# Patient Record
Sex: MALE | Race: WHITE | Employment: OTHER | URBAN - NONMETROPOLITAN AREA
[De-identification: names, ages, dates, MRNs, and addresses within clinical notes are randomized per-mention and may not be internally consistent; named-entity substitution may affect disease eponyms.]

---

## 2024-01-29 ENCOUNTER — OFFICE VISIT (OUTPATIENT)
Dept: PRIMARY CARE | Facility: CLINIC | Age: 89
End: 2024-01-29
Payer: MEDICARE

## 2024-01-29 ENCOUNTER — TELEPHONE (OUTPATIENT)
Dept: PRIMARY CARE | Facility: CLINIC | Age: 89
End: 2024-01-29

## 2024-01-29 VITALS
HEIGHT: 70 IN | SYSTOLIC BLOOD PRESSURE: 120 MMHG | HEART RATE: 50 BPM | DIASTOLIC BLOOD PRESSURE: 54 MMHG | OXYGEN SATURATION: 99 % | BODY MASS INDEX: 23.82 KG/M2 | WEIGHT: 166.4 LBS

## 2024-01-29 DIAGNOSIS — L72.0 EPIDERMOID CYST: ICD-10-CM

## 2024-01-29 DIAGNOSIS — K64.9 HEMORRHOIDS, UNSPECIFIED HEMORRHOID TYPE: Primary | ICD-10-CM

## 2024-01-29 PROCEDURE — 1160F RVW MEDS BY RX/DR IN RCRD: CPT | Performed by: STUDENT IN AN ORGANIZED HEALTH CARE EDUCATION/TRAINING PROGRAM

## 2024-01-29 PROCEDURE — 99204 OFFICE O/P NEW MOD 45 MIN: CPT | Performed by: STUDENT IN AN ORGANIZED HEALTH CARE EDUCATION/TRAINING PROGRAM

## 2024-01-29 PROCEDURE — 1159F MED LIST DOCD IN RCRD: CPT | Performed by: STUDENT IN AN ORGANIZED HEALTH CARE EDUCATION/TRAINING PROGRAM

## 2024-01-29 RX ORDER — INSULIN GLARGINE-YFGN 100 [IU]/ML
14 INJECTION, SOLUTION SUBCUTANEOUS EVERY MORNING
COMMUNITY
Start: 2023-07-18

## 2024-01-29 RX ORDER — GLIPIZIDE 2.5 MG/1
2.5 TABLET, EXTENDED RELEASE ORAL
COMMUNITY

## 2024-01-29 RX ORDER — PRAVASTATIN SODIUM 20 MG/1
1 TABLET ORAL DAILY
COMMUNITY
Start: 2023-12-27

## 2024-01-29 RX ORDER — TRIAMCINOLONE ACETONIDE 1 MG/G
CREAM TOPICAL 2 TIMES DAILY
Qty: 15 G | Refills: 0 | Status: SHIPPED | OUTPATIENT
Start: 2024-01-29

## 2024-01-29 RX ORDER — FINASTERIDE 5 MG/1
5 TABLET, FILM COATED ORAL DAILY
COMMUNITY
Start: 2023-12-05

## 2024-01-29 RX ORDER — METOPROLOL SUCCINATE 25 MG/1
0.5 TABLET, EXTENDED RELEASE ORAL DAILY
COMMUNITY
Start: 2023-10-02 | End: 2024-01-29

## 2024-01-29 RX ORDER — PROPYLTHIOURACIL 50 MG/1
50 TABLET ORAL 3 TIMES DAILY
COMMUNITY
Start: 2023-12-27

## 2024-01-29 RX ORDER — INSULIN GLARGINE 100 [IU]/ML
15 INJECTION, SOLUTION SUBCUTANEOUS EVERY MORNING
COMMUNITY
Start: 2019-02-08

## 2024-01-29 RX ORDER — FERROUS SULFATE 325(65) MG
325 TABLET ORAL
COMMUNITY
Start: 2022-12-13

## 2024-01-29 RX ORDER — CLOPIDOGREL BISULFATE 75 MG/1
75 TABLET ORAL DAILY
COMMUNITY
Start: 2023-10-10

## 2024-01-29 ASSESSMENT — PATIENT HEALTH QUESTIONNAIRE - PHQ9
SUM OF ALL RESPONSES TO PHQ9 QUESTIONS 1 AND 2: 0
1. LITTLE INTEREST OR PLEASURE IN DOING THINGS: NOT AT ALL
2. FEELING DOWN, DEPRESSED OR HOPELESS: NOT AT ALL

## 2024-01-29 NOTE — PROGRESS NOTES
Subjective:  Enrike Crespo is a 89 y.o. male who presents to clinic today for Establish Care (Rectal Bleeding)    [facility]    Bleeding hemmorhoid:  - not painful just bleeding  - he's had this for many years and had it operated on 35 years ago  - has been back for several weeks  - he has to wear a pad daily now which is bothersome  - he does take iron supplements for anemia  - he notes dark black stools and firm stools    He felt lightheaded yesterday  - he is [facility] on [facility].    Lumps:  - he's got some lumps on his chest and back  -he's had these for several years and they itch  - he once had one popped and a lot of debris came out per his report      Review of Systems    Assessment/Plan:  Enrike Crespo is a 89 y.o. male with an extensive medical history who presents to clinic today to address the following issues:   1. Hemorrhoids, unspecified hemorrhoid type  Referral to General Surgery      2. Epidermoid cyst  triamcinolone (Kenalog) 0.1 % cream        Hemorrhoid:  - Chronic problem, unresolved, new to this provider, requires further workup and treatment   - Discussed with pt that this is likely an internal hemorrhoid that has came out due to his bleeding and lack of pain. Will refer to surgeon where he may qualify for banding or other options  - we discussed topical treatments and stool softeners and need for soft stools to prevent future issues    Cysts:  - Chronic problem, unresolved, new to this provider, requires further workup and treatment   - Discussed with pt that these could be surgicall removed but that they are outside of the scope of my practice, will refer to general surgery for possible excision      Problem List Items Addressed This Visit    None  Visit Diagnoses       Hemorrhoids, unspecified hemorrhoid type    -  Primary    Relevant Orders    Referral to General Surgery    Epidermoid cyst        Relevant  "Medications    triamcinolone (Kenalog) 0.1 % cream            Follow up: as needed    Return precautions discussed.  An After Visit Summary was given to the patient.  All questions were answered and patient in agreement with plan.    Objective:  /54   Pulse 50   Ht 1.778 m (5' 10\")   Wt 75.5 kg (166 lb 6.4 oz)   SpO2 99%   BMI 23.88 kg/m²     Physical Exam  Vitals and nursing note reviewed.   Constitutional:       General: He is not in acute distress.     Appearance: He is obese. He is not ill-appearing.   HENT:      Head: Normocephalic and atraumatic.      Mouth/Throat:      Mouth: Mucous membranes are moist.   Eyes:      General: No scleral icterus.        Right eye: No discharge.         Left eye: No discharge.      Extraocular Movements: Extraocular movements intact.      Conjunctiva/sclera: Conjunctivae normal.   Pulmonary:      Effort: No respiratory distress.   Genitourinary:         Comments: Hemorrhoid present in rectum that is nontender  Skin:     General: Skin is dry.      Comments: Raised firm lesion on chest 4cm by 2cm with a central dark area that appears to be the opening, he has 3 similar smaller lesions on his back   Neurological:      General: No focal deficit present.      Mental Status: He is alert and oriented to person, place, and time.   Psychiatric:         Thought Content: Thought content normal.         Judgment: Judgment normal.         I spent 39 minutes in total time for this visit including all related clinical activities before, during, and after the visit excluding other billable activities/procedure time.     Rosario Dumas MD    "

## 2024-01-29 NOTE — TELEPHONE ENCOUNTER
Adri:    Please call patient to discuss the following:    Please ask [facility] on Friday if it would be safe for you to hold your blood thinners for Dr. Mg to possibly band your hemorrhoids. Bring this answer with you when you see Dr. Mg.  It may be helpful to call his daughter and ask her.    Once finished please close the encounter.    Thank you,  Rosario Dumas MD

## 2024-02-02 ENCOUNTER — OFFICE VISIT (OUTPATIENT)
Dept: SURGERY | Facility: CLINIC | Age: 89
End: 2024-02-02
Payer: MEDICARE

## 2024-02-02 VITALS
DIASTOLIC BLOOD PRESSURE: 62 MMHG | SYSTOLIC BLOOD PRESSURE: 124 MMHG | WEIGHT: 166 LBS | BODY MASS INDEX: 23.77 KG/M2 | HEIGHT: 70 IN

## 2024-02-02 DIAGNOSIS — K64.9 HEMORRHOIDS, UNSPECIFIED HEMORRHOID TYPE: ICD-10-CM

## 2024-02-02 PROCEDURE — 99203 OFFICE O/P NEW LOW 30 MIN: CPT | Performed by: SURGERY

## 2024-02-02 PROCEDURE — 1160F RVW MEDS BY RX/DR IN RCRD: CPT | Performed by: SURGERY

## 2024-02-02 PROCEDURE — 1159F MED LIST DOCD IN RCRD: CPT | Performed by: SURGERY

## 2024-02-02 NOTE — LETTER
"2024     Rosario Dumas MD  [address]\[facility]\"" [id number]    Patient: Enrike Crespo   YOB: 1931   Date of Visit: 2024       Dear Dr. Rosario Dumas MD:    Thank you for referring Enrike Crespo to me for evaluation. Below are my notes for this consultation.  If you have questions, please do not hesitate to call me. I look forward to following your patient along with you.       Sincerely,     Laila Mg MD      CC: No Recipients  ______________________________________________________________________________________    General Surgery Consultation    Patient: Enrike Crespo  : 1931  MRN: [id number]  Date of Consultation: 24    Referring Primary Care Provider: Rosario Dumas MD    Chief Complaint: BRBPR    History of Present Illness: Enrike Crespo is a 89 y.o. old male seen at the request of Rosario Dumas MD for evaluation of BRBPR.  He has constipation.  He has bowel movements every 2 to 3 days.  He reports that these are \"really hard\".  He states that he is supposed to take stool softeners regularly but does not.  He takes a stool softener on average maybe once every 3 days.  He sees bright red blood per rectum a couple times per month.  This is usually dripping into the toilet bowl as well as soaking through his underpants.  He has been wearing a pad when this bleeding occurs.  The bleeding usually occurs when he strains with a bowel movement.  The bleeding lasts about 2 to 3 days before subsiding.  He has never had a colonoscopy.  He is not interested in having a colonoscopy.  He has no family history of colon or rectal cancer.  He has cardiac disease and takes Plavix and Eliquis.  His daughter states that she does not believe that he would be able to hold these for procedure.    Separate from the bleeding issue, he also has numerous sebaceous cysts.  He has a large 1 on the right side of his chest.  He has a large 1 on his lower back, a as well as 2 " smaller ones on his back.  These occasionally itch.  They have never been infected.  They have previously been drained.  He has had other cysts excised in the past.    Medical History:  Blood per rectum  Diabetes  Heart failure  History of stroke  History of cardiac arrest, has a cardiac defibrillator  Sebaceous cysts    Surgical History:  Cardiac defibrillator placement  Hemorrhoidectomy approximately 35 years ago    Home Medications:  Prior to Admission medications    Medication Sig Start Date End Date Taking? Authorizing Provider   apixaban (Eliquis) 5 mg tablet Take 1 tablet (5 mg) by mouth twice a day. 12/30/19   Historical Provider, MD   clopidogrel (Plavix) 75 mg tablet Take 1 tablet (75 mg) by mouth once daily. 10/10/23   Historical Provider, MD   ferrous sulfate, 325 mg ferrous sulfate, tablet Take 1 tablet by mouth once daily with breakfast. 12/13/22   Historical Provider, MD   finasteride (Proscar) 5 mg tablet Take 1 tablet (5 mg) by mouth once daily. 12/5/23   Historical Provider, MD   glipiZIDE XL (Glucotrol XL) 2.5 mg 24 hr tablet Take 1 tablet (2.5 mg) by mouth once daily.    Historical Provider, MD   insulin glargine (Lantus) 100 unit/mL injection Inject 15 Units under the skin once daily in the morning. 2/8/19   Historical Provider, MD   insulin glargine-yfgn 100 unit/mL (3 mL) Pen Inject 14 Units under the skin once daily in the morning. 7/18/23   Historical Provider, MD   metoprolol succinate XL (Toprol-XL) 25 mg 24 hr tablet Take 0.5 tablets (12.5 mg) by mouth once daily. 10/2/23 1/29/24  Historical Provider, MD   pravastatin (Pravachol) 20 mg tablet Take 1 tablet (20 mg) by mouth once daily. 12/27/23   Historical Provider, MD   propylthiouracil (PTU) 50 mg tablet Take 1 tablet (50 mg) by mouth 3 times a day. 12/27/23   Historical Provider, MD   sacubitriL-valsartan (Entresto) 24-26 mg tablet Take 1 tablet by mouth 2 times a day. 6/29/23 1/29/24  Historical Provider, MD   triamcinolone (Kenalog)  0.1 % cream Apply topically 2 times a day. Apply to affected area 1-2 times daily as needed. 1/29/24   Rosario Dumas MD     Allergies:  is allergic to penicillins.    Family History:   No family history of colon or rectal cancer.  Family history of diabetes and heart disease.    Social History:  Smokes 1 pack/day for 75 years, no alcohol or drug use.    ROS:  Constitutional:  no fever, sweats, and chills  Cardiovascular: + Swelling in ankles, occasional dizziness  Respiratory: + Chronic cough  Gastrointestinal: + BRBPR, black stools, on iron supplementation  Genitourinary: + Frequent urination, regular nighttime urination, history of kidney stones, history of UTIs  Musculoskeletal: + Arthritis, joint pain/stiffness, unsteady gait  Integumentary: no rashes  Neurological: + Weakness, history of stroke  Endocrine: no heat or cold intolerance  Heme/Lymph: + Easy bruising or bleeding, on Eliquis and Plavix    Objective:  There were no vitals taken for this visit.    Physical Exam:  Constitutional: No acute distress, conversant, pleasant, accompanied by his daughter, in a wheelchair, has a life alert type necklace  Neurologic: alert and oriented  Psych: appropriate affect  Ears, Nose, Mouth and Throat: mucus membranes moist  Pulmonary: No labored breathing  Cardiovascular: Regular rate and rhythm, defibrillator in left chest  Abdomen: Nondistended, BMI 24  Rectal: No significant external hemorrhoidal disease or perianal abnormalities, normal tone on digital rectal exam, no palpable masses.  Musculoskeletal: Moves all extremities, no edema  Skin: He has a large sebaceous cyst with a central dry open punctum measuring roughly 4 cm in diameter overlying the right upper chest.  This is relatively mobile, nontender, no erythema or sign of infection.  On his lower back he has a similar large sebaceous cyst with an open punctum and no sign of infection.  He has 2 smaller cyst as well on the lower back.    Procedure:  Lighted  anoscopy was performed with the assistance of my medical assistant, Simi.  This was done with the patient leaning over the exam table as he was unable to kneel.  He was actually quite weak when standing and got a little bit dizzy.  He required significant support and assistance.  This revealed only minimally enlarged internal hemorrhoids.  These did not bleed on contact with the scope.    Labs/Imaging:   No pertinent labs or imaging available for review.    Assessment and Plan: Enrike Crespo is a 89 y.o. old male with blood per rectum.  The most likely etiology is his hemorrhoids.  We discussed the option of colonoscopy to evaluate for a more proximal source.  He is not interested in this.  His daughter is under the impression that it would be too risky to hold his Plavix and Eliquis for hemorrhoid banding procedure, particularly because I would also need it held for 5 days after the procedure.  I think his hemorrhoids are minimal enough that if we get his chronic constipation under control that this will greatly improve his bleeding.  Recommended that he take Colace twice daily.  He needs to drink adequate water.  We discussed the option of taking prunes or prune juice, but patient does not like these.  Regardless of how we accomplish this, we need to get his bowel movements soft and regular so that he does not have to strain.    In regards to his cysts, I would be willing to excise these in the office under local anesthetic despite him taking blood thinners.  Because these are superficial, we will hold pressure if he has any issues with oozing.  Patient understands that there is a risk of bleeding, but he would like these excised.  I will see him back next Friday to excise the lesion on his chest.  If he tolerates this okay, we may excise the larger lesion on his back as well.  I have a little bit of concerned that he may not be able to lay in prone positioning, so we may have to do these on separate days  given his deconditioning.    Laila Mg MD  2/2/2024

## 2024-02-02 NOTE — PROGRESS NOTES
"General Surgery Consultation    Patient: Enrike Crespo  : 1931  MRN: [id number]  Date of Consultation: 24    Referring Primary Care Provider: Rosario Dumas MD    Chief Complaint: BRBPR    History of Present Illness: Enrike Crespo is a 89 y.o. old male seen at the request of Rosario Dumas MD for evaluation of BRBPR.  He has constipation.  He has bowel movements every 2 to 3 days.  He reports that these are \"really hard\".  He states that he is supposed to take stool softeners regularly but does not.  He takes a stool softener on average maybe once every 3 days.  He sees bright red blood per rectum a couple times per month.  This is usually dripping into the toilet bowl as well as soaking through his underpants.  He has been wearing a pad when this bleeding occurs.  The bleeding usually occurs when he strains with a bowel movement.  The bleeding lasts about 2 to 3 days before subsiding.  He has never had a colonoscopy.  He is not interested in having a colonoscopy.  He has no family history of colon or rectal cancer.  He has cardiac disease and takes Plavix and Eliquis.  His daughter states that she does not believe that he would be able to hold these for procedure.    Separate from the bleeding issue, he also has numerous sebaceous cysts.  He has a large 1 on the right side of his chest.  He has a large 1 on his lower back, a as well as 2 smaller ones on his back.  These occasionally itch.  They have never been infected.  They have previously been drained.  He has had other cysts excised in the past.    Medical History:  Blood per rectum  Diabetes  Heart failure  History of stroke  History of cardiac arrest, has a cardiac defibrillator  Sebaceous cysts    Surgical History:  Cardiac defibrillator placement  Hemorrhoidectomy approximately 35 years ago    Home Medications:  Prior to Admission medications    Medication Sig Start Date End Date Taking? Authorizing Provider   apixaban (Eliquis) 5 mg tablet " Take 1 tablet (5 mg) by mouth twice a day. 12/30/19   Historical Provider, MD   clopidogrel (Plavix) 75 mg tablet Take 1 tablet (75 mg) by mouth once daily. 10/10/23   Historical Provider, MD   ferrous sulfate, 325 mg ferrous sulfate, tablet Take 1 tablet by mouth once daily with breakfast. 12/13/22   Historical Provider, MD   finasteride (Proscar) 5 mg tablet Take 1 tablet (5 mg) by mouth once daily. 12/5/23   Historical Provider, MD   glipiZIDE XL (Glucotrol XL) 2.5 mg 24 hr tablet Take 1 tablet (2.5 mg) by mouth once daily.    Historical Provider, MD   insulin glargine (Lantus) 100 unit/mL injection Inject 15 Units under the skin once daily in the morning. 2/8/19   Historical Provider, MD   insulin glargine-yfgn 100 unit/mL (3 mL) Pen Inject 14 Units under the skin once daily in the morning. 7/18/23   Historical Provider, MD   metoprolol succinate XL (Toprol-XL) 25 mg 24 hr tablet Take 0.5 tablets (12.5 mg) by mouth once daily. 10/2/23 1/29/24  Historical Provider, MD   pravastatin (Pravachol) 20 mg tablet Take 1 tablet (20 mg) by mouth once daily. 12/27/23   Historical Provider, MD   propylthiouracil (PTU) 50 mg tablet Take 1 tablet (50 mg) by mouth 3 times a day. 12/27/23   Historical Provider, MD   sacubitriL-valsartan (Entresto) 24-26 mg tablet Take 1 tablet by mouth 2 times a day. 6/29/23 1/29/24  Historical Provider, MD   triamcinolone (Kenalog) 0.1 % cream Apply topically 2 times a day. Apply to affected area 1-2 times daily as needed. 1/29/24   Rosario Dumas MD     Allergies:  is allergic to penicillins.    Family History:   No family history of colon or rectal cancer.  Family history of diabetes and heart disease.    Social History:  Smokes 1 pack/day for 75 years, no alcohol or drug use.    ROS:  Constitutional:  no fever, sweats, and chills  Cardiovascular: + Swelling in ankles, occasional dizziness  Respiratory: + Chronic cough  Gastrointestinal: + BRBPR, black stools, on iron  supplementation  Genitourinary: + Frequent urination, regular nighttime urination, history of kidney stones, history of UTIs  Musculoskeletal: + Arthritis, joint pain/stiffness, unsteady gait  Integumentary: no rashes  Neurological: + Weakness, history of stroke  Endocrine: no heat or cold intolerance  Heme/Lymph: + Easy bruising or bleeding, on Eliquis and Plavix    Objective:  There were no vitals taken for this visit.    Physical Exam:  Constitutional: No acute distress, conversant, pleasant, accompanied by his daughter, in a wheelchair, has a life alert type necklace  Neurologic: alert and oriented  Psych: appropriate affect  Ears, Nose, Mouth and Throat: mucus membranes moist  Pulmonary: No labored breathing  Cardiovascular: Regular rate and rhythm, defibrillator in left chest  Abdomen: Nondistended, BMI 24  Rectal: No significant external hemorrhoidal disease or perianal abnormalities, normal tone on digital rectal exam, no palpable masses.  Musculoskeletal: Moves all extremities, no edema  Skin: He has a large sebaceous cyst with a central dry open punctum measuring roughly 4 cm in diameter overlying the right upper chest.  This is relatively mobile, nontender, no erythema or sign of infection.  On his lower back he has a similar large sebaceous cyst with an open punctum and no sign of infection.  He has 2 smaller cyst as well on the lower back.    Procedure:  Lighted anoscopy was performed with the assistance of my medical assistant, Simi.  This was done with the patient leaning over the exam table as he was unable to kneel.  He was actually quite weak when standing and got a little bit dizzy.  He required significant support and assistance.  This revealed only minimally enlarged internal hemorrhoids.  These did not bleed on contact with the scope.    Labs/Imaging:   No pertinent labs or imaging available for review.    Assessment and Plan: Enrike Crespo is a 89 y.o. old male with blood per rectum.  The  most likely etiology is his hemorrhoids.  We discussed the option of colonoscopy to evaluate for a more proximal source.  He is not interested in this.  His daughter is under the impression that it would be too risky to hold his Plavix and Eliquis for hemorrhoid banding procedure, particularly because I would also need it held for 5 days after the procedure.  I think his hemorrhoids are minimal enough that if we get his chronic constipation under control that this will greatly improve his bleeding.  Recommended that he take Colace twice daily.  He needs to drink adequate water.  We discussed the option of taking prunes or prune juice, but patient does not like these.  Regardless of how we accomplish this, we need to get his bowel movements soft and regular so that he does not have to strain.    In regards to his cysts, I would be willing to excise these in the office under local anesthetic despite him taking blood thinners.  Because these are superficial, we will hold pressure if he has any issues with oozing.  Patient understands that there is a risk of bleeding, but he would like these excised.  I will see him back next Friday to excise the lesion on his chest.  If he tolerates this okay, we may excise the larger lesion on his back as well.  I have a little bit of concerned that he may not be able to lay in prone positioning, so we may have to do these on separate days given his deconditioning.    Laila Mg MD  2/2/2024

## 2024-02-09 ENCOUNTER — APPOINTMENT (OUTPATIENT)
Dept: SURGERY | Facility: CLINIC | Age: 89
End: 2024-02-09
Payer: MEDICARE

## 2024-02-15 ENCOUNTER — APPOINTMENT (OUTPATIENT)
Dept: SURGERY | Facility: CLINIC | Age: 89
End: 2024-02-15
Payer: MEDICARE

## 2024-02-16 ENCOUNTER — APPOINTMENT (OUTPATIENT)
Dept: SURGERY | Facility: CLINIC | Age: 89
End: 2024-02-16
Payer: MEDICARE

## 2024-02-22 ENCOUNTER — APPOINTMENT (OUTPATIENT)
Dept: SURGERY | Facility: CLINIC | Age: 89
End: 2024-02-22
Payer: MEDICARE

## 2024-03-07 ENCOUNTER — PROCEDURE VISIT (OUTPATIENT)
Dept: SURGERY | Facility: CLINIC | Age: 89
End: 2024-03-07
Payer: MEDICARE

## 2024-03-07 VITALS
BODY MASS INDEX: 23.77 KG/M2 | WEIGHT: 166 LBS | DIASTOLIC BLOOD PRESSURE: 60 MMHG | SYSTOLIC BLOOD PRESSURE: 122 MMHG | HEIGHT: 70 IN

## 2024-03-07 DIAGNOSIS — M79.89 SOFT TISSUE MASS: Primary | ICD-10-CM

## 2024-03-07 PROCEDURE — 21552 EXC NECK LES SC 3 CM/>: CPT | Performed by: SURGERY

## 2024-03-07 PROCEDURE — 99213 OFFICE O/P EST LOW 20 MIN: CPT | Performed by: SURGERY

## 2024-03-07 PROCEDURE — 88304 TISSUE EXAM BY PATHOLOGIST: CPT | Performed by: PATHOLOGY

## 2024-03-07 PROCEDURE — 88304 TISSUE EXAM BY PATHOLOGIST: CPT

## 2024-03-07 PROCEDURE — 0752T DGTZ GLS MCRSCP SLD LVL III: CPT

## 2024-03-07 NOTE — PROGRESS NOTES
General Surgery Follow-up Visit    Patient: Enrike Crespo  : 1931  MRN: [id number]  Date of Visit: 24    Chief Complaint: Cyst on chest    History of Present Illness: Enrike Crespo is a 89 y.o. old male with sebaceous cysts.  The largest is on the right anterior chest.  He has another large 1 on his lower back to the left of midline.  He has a few additional smaller cysts on his back.  He held his Eliquis and Plavix for office-based excision of the large cyst on his chest today.  Medical History:  Blood per rectum  Diabetes  Heart failure  History of stroke  History of cardiac arrest, has a cardiac defibrillator  Sebaceous cysts     Surgical History:  Cardiac defibrillator placement  Hemorrhoidectomy approximately 35 years ago    Home Medications:  Prior to Admission medications    Medication Sig Start Date End Date Taking? Authorizing Provider   apixaban (Eliquis) 5 mg tablet Take 1 tablet (5 mg) by mouth twice a day. 19  Yes Historical Provider, MD   clopidogrel (Plavix) 75 mg tablet Take 1 tablet (75 mg) by mouth once daily. 10/10/23  Yes Historical Provider, MD   ferrous sulfate, 325 mg ferrous sulfate, tablet Take 1 tablet by mouth once daily with breakfast. 22  Yes Historical Provider, MD   finasteride (Proscar) 5 mg tablet Take 1 tablet (5 mg) by mouth once daily. 23  Yes Historical Provider, MD   glipiZIDE XL (Glucotrol XL) 2.5 mg 24 hr tablet Take 1 tablet (2.5 mg) by mouth once daily.   Yes Historical Provider, MD   insulin glargine (Lantus) 100 unit/mL injection Inject 15 Units under the skin once daily in the morning. 19  Yes Historical Provider, MD   insulin glargine-yfgn 100 unit/mL (3 mL) Pen Inject 14 Units under the skin once daily in the morning. 23  Yes Historical Provider, MD   pravastatin (Pravachol) 20 mg tablet Take 1 tablet (20 mg) by mouth once daily. 23  Yes Historical Provider, MD   propylthiouracil (PTU) 50 mg tablet Take 1 tablet (50 mg) by  "mouth 3 times a day. 12/27/23  Yes Historical Provider, MD   triamcinolone (Kenalog) 0.1 % cream Apply topically 2 times a day. Apply to affected area 1-2 times daily as needed. 1/29/24  Yes Rosario Dumas MD   metoprolol succinate XL (Toprol-XL) 25 mg 24 hr tablet Take 0.5 tablets (12.5 mg) by mouth once daily. 10/2/23 1/29/24  Historical Provider, MD   sacubitriL-valsartan (Entresto) 24-26 mg tablet Take 1 tablet by mouth 2 times a day. 6/29/23 1/29/24  Historical Provider, MD       Allergies:  is allergic to penicillins.     Family History:   No family history of colon or rectal cancer.  Family history of diabetes and heart disease.     Social History:  Smokes 1 pack/day for 75 years, no alcohol or drug use.     ROS:  Constitutional:  no fever, sweats, and chills  Cardiovascular: + Swelling in ankles, occasional dizziness  Respiratory: + Chronic cough  Gastrointestinal: + BRBPR, black stools, on iron supplementation  Genitourinary: + Frequent urination, regular nighttime urination, history of kidney stones, history of UTIs  Musculoskeletal: + Arthritis, joint pain/stiffness, unsteady gait  Integumentary: no rashes  Neurological: + Weakness, history of stroke  Endocrine: no heat or cold intolerance  Heme/Lymph: + Easy bruising or bleeding, on Eliquis and Plavix    Objective:  /60   Ht 1.78 m (5' 10.08\")   Wt 75.3 kg (166 lb)   BMI 23.76 kg/m²     Physical Exam:  Constitutional: No acute distress, conversant, pleasant, accompanied by his daughter, in a wheelchair, has a life alert type necklace  Neurologic: alert and oriented  Psych: appropriate affect  Ears, Nose, Mouth and Throat: mucus membranes moist  Pulmonary: No labored breathing  Cardiovascular: Regular rate and rhythm, defibrillator in left chest  Abdomen: Nondistended, BMI 24  Musculoskeletal: Moves all extremities, no edema  Skin: He has a large sebaceous cyst with a central dry open punctum measuring roughly 4 cm in diameter overlying the " right upper chest.  This is relatively mobile, nontender, no erythema or sign of infection.  On his lower back he has a similar large sebaceous cyst with an open punctum and no sign of infection.  He has 2 smaller cyst as well on the lower back.    Procedure: Excision of soft tissue mass on chest  Description: Patient was brought to the procedure room and placed in supine position.  The skin of the right chest was prepped with Betadine solution.  An incision was made overlying the large subcutaneous soft tissue mass on his right upper chest after anesthetizing the skin with 1% lidocaine.  The soft tissue mass had a capsule filled with a large volume of sebaceous debris.  This was consistent with a sebaceous cyst.  It measured 4 cm diameter.  It had a central punctum which was included within the incision.  The contents were expressed out of the capsule to facilitate removal.  The capsule was then excised in its entirety and for the most part remained intact with the exception of the anterior surface which was incised with the initial incision.  The wound was irrigated.  Hemostasis was obtained with cautery.  The skin was closed with simple interrupted 4-0 Prolene.  He tolerated the procedure well.    Labs/Imaging:   No pertinent labs or imaging available for review.    Assessment and Plan: Enrike Crespo is a 89 y.o. old male with multiple sebaceous cysts.  The most bothersome to him was the one on his right upper chest.  We excised this in the office today.  He tolerated the procedure well.  He can keep a Band-Aid dressing on the incision to protect his clothing.  He will resume his antiplatelet and anticoagulation tomorrow.  I will see him back on 3/18/2024 for suture removal and to review pathology.  If he is interested in having the larger lesion on his lower back excised in the future, we will arrange for that to be done in the office as well.    Laila Mg MD  3/7/2024

## 2024-03-13 LAB
LABORATORY COMMENT REPORT: NORMAL
PATH REPORT.FINAL DX SPEC: NORMAL
PATH REPORT.GROSS SPEC: NORMAL
PATH REPORT.RELEVANT HX SPEC: NORMAL
PATH REPORT.TOTAL CANCER: NORMAL

## 2024-03-18 ENCOUNTER — PROCEDURE VISIT (OUTPATIENT)
Dept: SURGERY | Facility: CLINIC | Age: 89
End: 2024-03-18
Payer: MEDICARE

## 2024-03-18 VITALS
WEIGHT: 166 LBS | SYSTOLIC BLOOD PRESSURE: 124 MMHG | BODY MASS INDEX: 23.77 KG/M2 | DIASTOLIC BLOOD PRESSURE: 70 MMHG | HEIGHT: 70 IN

## 2024-03-18 DIAGNOSIS — Z48.02 VISIT FOR SUTURE REMOVAL: Primary | ICD-10-CM

## 2024-03-18 PROCEDURE — 99212 OFFICE O/P EST SF 10 MIN: CPT | Performed by: SURGERY

## 2024-03-18 NOTE — PROGRESS NOTES
"General Surgery Post-Operative Visit    Patient: Enrike Crespo  : 1931  MRN: [id number]  Date of Visit: 24    Chief Complaint: s/p excision of a soft tissue mass    History of Present Illness: Enrike Crespo is a 89 y.o. old male s/p excision of a soft tissue mass on the right side of his chest.  Pathology showed epidermal inclusion cyst.  He presents today for pathology review and suture removal.  He has been doing well.  He is back on his anticoagulation.  He has a little bit of a hematoma underneath his incision.  He has been keeping a dry dressing on the incision, although when we removed the dressing today, he had a small amount of bloody drainage on it.  He reports that he \"bleeds from everywhere\" while on his Eliquis.  He has an additional bandage on his right forearm where he had bleeding from a skin tear.  He denies any fever or redness at the incision.    Home Medications:  Prior to Admission medications    Medication Sig Start Date End Date Taking? Authorizing Provider   apixaban (Eliquis) 5 mg tablet Take 1 tablet (5 mg) by mouth twice a day. 19   Historical Provider, MD   clopidogrel (Plavix) 75 mg tablet Take 1 tablet (75 mg) by mouth once daily. 10/10/23   Historical Provider, MD   ferrous sulfate, 325 mg ferrous sulfate, tablet Take 1 tablet by mouth once daily with breakfast. 22   Historical Provider, MD   finasteride (Proscar) 5 mg tablet Take 1 tablet (5 mg) by mouth once daily. 23   Historical Provider, MD   glipiZIDE XL (Glucotrol XL) 2.5 mg 24 hr tablet Take 1 tablet (2.5 mg) by mouth once daily.    Historical Provider, MD   insulin glargine (Lantus) 100 unit/mL injection Inject 15 Units under the skin once daily in the morning. 19   Historical Provider, MD   insulin glargine-yfgn 100 unit/mL (3 mL) Pen Inject 14 Units under the skin once daily in the morning. 23   Historical Provider, MD   metoprolol succinate XL (Toprol-XL) 25 mg 24 hr tablet Take 0.5 " "tablets (12.5 mg) by mouth once daily. 10/2/23 1/29/24  Historical Provider, MD   pravastatin (Pravachol) 20 mg tablet Take 1 tablet (20 mg) by mouth once daily. 12/27/23   Historical Provider, MD   propylthiouracil (PTU) 50 mg tablet Take 1 tablet (50 mg) by mouth 3 times a day. 12/27/23   Historical Provider, MD   sacubitriL-valsartan (Entresto) 24-26 mg tablet Take 1 tablet by mouth 2 times a day. 6/29/23 1/29/24  Historical Provider, MD   triamcinolone (Kenalog) 0.1 % cream Apply topically 2 times a day. Apply to affected area 1-2 times daily as needed. 1/29/24   Rosario Dumas MD     Allergies:  is allergic to penicillins.    ROS:  No fever, redness, drainage, or bleeding from incision    Objective:  /70   Ht 1.78 m (5' 10.08\")   Wt 75.3 kg (166 lb)   BMI 23.76 kg/m²     Physical Exam:  No acute distress  No labored breathing  Incisional right-sided chest clean, dry, intact, no erythema, he has a small fullness underneath this incision which is likely of a hematoma.  No sign of infection.  No expressible drainage. Sutures were removed without complication.  Steri-Strips were placed.    Assessment and Plan: Enrike Crespo is a 89 y.o. old male s/p excision of a soft tissue mass from the right side of his chest.  Pathology confirmed that this was a benign epidermal inclusion cyst.  His sutures were removed in the office today.  He has a small hematoma, but no sign of infection and we will therefore take an observational approach to this.  He will leave Steri-Strips in place until they fall off on their own.  If still present in 2 weeks, he can take them off.  He has additional cysts on his back.  He is interested in having these excised in the future.  His daughter wanted to give it a little more time in between the procedures.  He is tentatively scheduled to see me back on 4/25/24 for an office-based procedure we will excise the larger cyst on his lower back.  He will hold his Eliquis for that " appointment.    Laila Mg MD  3/18/2024

## 2024-04-25 ENCOUNTER — PROCEDURE VISIT (OUTPATIENT)
Dept: SURGERY | Facility: CLINIC | Age: 89
End: 2024-04-25
Payer: MEDICARE

## 2024-04-25 VITALS
HEIGHT: 70 IN | BODY MASS INDEX: 23.77 KG/M2 | WEIGHT: 166 LBS | SYSTOLIC BLOOD PRESSURE: 126 MMHG | HEART RATE: 68 BPM | DIASTOLIC BLOOD PRESSURE: 70 MMHG

## 2024-04-25 DIAGNOSIS — M79.89 SOFT TISSUE MASS: Primary | ICD-10-CM

## 2024-04-25 PROCEDURE — 21930 EXC BACK LES SC < 3 CM: CPT | Performed by: SURGERY

## 2024-04-25 PROCEDURE — 21931 EXC BACK LES SC 3 CM/>: CPT | Performed by: SURGERY

## 2024-04-25 PROCEDURE — 99212 OFFICE O/P EST SF 10 MIN: CPT | Performed by: SURGERY

## 2024-04-25 PROCEDURE — 88304 TISSUE EXAM BY PATHOLOGIST: CPT | Performed by: PATHOLOGY

## 2024-04-25 PROCEDURE — 88304 TISSUE EXAM BY PATHOLOGIST: CPT

## 2024-04-25 PROCEDURE — 0752T DGTZ GLS MCRSCP SLD LVL III: CPT

## 2024-04-25 NOTE — PROGRESS NOTES
General Surgery Follow-up Visit    Patient: Enrike Crespo  : 1931  MRN: [id number]  Date of Visit: 24    Chief Complaint: Soft tissue mass on back     History of Present Illness: Enrike Crespo is a 89 y.o. old male with multiple sebaceous cysts.  I have previously removed one from his chest.  We did this in the office under local anesthetic.  He did well with that procedure.  He presents today to have two of the more bothersome cysts on his back excised.  These itch and give him discomfort.  He has held his Eliquis and Plavix.    Medical History:  Blood per rectum  Diabetes  Heart failure  History of stroke  History of cardiac arrest, has a cardiac defibrillator  Sebaceous cysts     Surgical History:  Excision of epidermal inclusion cyst on chest  Cardiac defibrillator placement  Hemorrhoidectomy approximately 35 years ago    Home Medications:  Prior to Admission medications    Medication Sig Start Date End Date Taking? Authorizing Provider   apixaban (Eliquis) 5 mg tablet Take 1 tablet (5 mg) by mouth twice a day. 19   Historical Provider, MD   clopidogrel (Plavix) 75 mg tablet Take 1 tablet (75 mg) by mouth once daily. 10/10/23   Historical Provider, MD   ferrous sulfate, 325 mg ferrous sulfate, tablet Take 1 tablet by mouth once daily with breakfast. 22   Historical Provider, MD   finasteride (Proscar) 5 mg tablet Take 1 tablet (5 mg) by mouth once daily. 23   Historical Provider, MD   glipiZIDE XL (Glucotrol XL) 2.5 mg 24 hr tablet Take 1 tablet (2.5 mg) by mouth once daily.    Historical Provider, MD   insulin glargine (Lantus) 100 unit/mL injection Inject 15 Units under the skin once daily in the morning. 19   Historical Provider, MD   insulin glargine-yfgn 100 unit/mL (3 mL) Pen Inject 14 Units under the skin once daily in the morning. 23   Historical Provider, MD   metoprolol succinate XL (Toprol-XL) 25 mg 24 hr tablet Take 0.5 tablets (12.5 mg) by mouth once daily.  "10/2/23 1/29/24  Historical Provider, MD   pravastatin (Pravachol) 20 mg tablet Take 1 tablet (20 mg) by mouth once daily. 12/27/23   Historical Provider, MD   propylthiouracil (PTU) 50 mg tablet Take 1 tablet (50 mg) by mouth 3 times a day. 12/27/23   Historical Provider, MD   sacubitriL-valsartan (Entresto) 24-26 mg tablet Take 1 tablet by mouth 2 times a day. 6/29/23 1/29/24  Historical Provider, MD   triamcinolone (Kenalog) 0.1 % cream Apply topically 2 times a day. Apply to affected area 1-2 times daily as needed. 1/29/24   Rosario Dumas MD     Allergies:  is allergic to penicillins.     Family History:   No family history of colon or rectal cancer.  Family history of diabetes and heart disease.     Social History:  Smokes 1 pack/day for 75 years, no alcohol or drug use.     ROS:  Constitutional:  no fever, sweats, and chills  Cardiovascular: + Swelling in ankles, occasional dizziness  Respiratory: + Chronic cough  Gastrointestinal: + BRBPR, black stools, on iron supplementation  Genitourinary: + Frequent urination, regular nighttime urination, history of kidney stones, history of UTIs  Musculoskeletal: + Arthritis, joint pain/stiffness, unsteady gait  Integumentary: + Cysts on back  Neurological: + Weakness, history of stroke  Endocrine: no heat or cold intolerance  Heme/Lymph: + Easy bruising or bleeding, on Eliquis and Plavix    Objective:  /70   Pulse 68   Ht 1.78 m (5' 10.08\")   Wt 75.3 kg (166 lb)   BMI 23.76 kg/m²     Physical Exam:  Constitutional: No acute distress, conversant, pleasant, accompanied by his daughter, in a wheelchair, has a life alert type necklace  Neurologic: alert and oriented  Psych: appropriate affect  Ears, Nose, Mouth and Throat: mucus membranes moist  Pulmonary: No labored breathing  Cardiovascular: Regular rate and rhythm, defibrillator in left chest  Abdomen: Nondistended, BMI 24  Musculoskeletal: Moves all extremities, no edema  Skin:  On his mid to lower back " along midline he has a large sebaceous cyst, 5cm diameter, with an open 2 mm punctum, no sign of infection.  He has a smaller cyst as well on the left lower back, 2.5 cm in diameter, also with a dark central 2 mm punctum.     Procedure: Excision of soft tissue masses on back  Description: Patient was brought to the procedure room and placed in prone position.  The skin of the mid to lower back was prepped with Betadine solution.  An incision was made overlying the subcutaneous soft tissue mass on his mid-lower back along the midline after anesthetizing the skin with 1% lidocaine.  The soft tissue mass had a capsule filled with a large volume of sebaceous debris.  This was consistent with a sebaceous cyst.  It measured 5 cm diameter.  The capsule was excised in its entirety.  The wound was irrigated.  The wound was hemostatic.  The skin was closed with simple interrupted 4-0 Prolene, as well as a mattress suture in the central portion.  This was then repeated for the cyst on the left lower back.  The skin was anesthetized using 1% lidocaine.  Incision was made overlying the subcutaneous soft tissue mass.  This was also a capsule filled with dark sebaceous debris.  The capsule was removed in its entirety.  Hemostasis was obtained with cautery.  The wound was irrigated.  Skin was closed with simple interrupted 4-0 Prolene.  He tolerated the procedure well.    Labs/Imaging:   No pertinent labs or imaging available for review.    Assessment and Plan: Enrike Crespo is a 89 y.o. old male with sebaceous cysts on his lower back.  Two of these were excised in the office today under local anesthetic.  He tolerated the procedure well.  [facility] coming [facility] Monday for a wound check.  He will need sutures removed in approximately 10 days.  I will schedule a follow-up for suture removal.  However, if his [facility] nurse is comfortable removing the sutures herself she could do this in 10-14 days and he can cancel that visit.  He  can wear a protective dressing just to keep his clothing clean in case if there is any drainage, particularly with him resuming his blood thinners. He can bathe/shower as normal.    Laila gM MD  4/25/2024

## 2024-05-09 ENCOUNTER — APPOINTMENT (OUTPATIENT)
Dept: SURGERY | Facility: CLINIC | Age: 89
End: 2024-05-09
Payer: MEDICARE

## 2024-05-16 ENCOUNTER — TELEPHONE (OUTPATIENT)
Dept: SURGERY | Facility: CLINIC | Age: 89
End: 2024-05-16
Payer: MEDICARE

## 2024-05-16 NOTE — TELEPHONE ENCOUNTER
I called the patient to discuss pathology report from recent cyst excision.  Was no answer, but voicemail was left with callback number provided.  Pathology confirmed that these were benign epidermal inclusion cysts.  His home health nurse had previously moved his sutures.  He will follow-up as needed.    Laila Mg MD  05/16/24  10:15 AM

## 2024-06-12 ENCOUNTER — HOSPITAL ENCOUNTER (OUTPATIENT)
Age: 89
Setting detail: SURGERY ADMIT
End: 2024-06-12
Attending: STUDENT IN AN ORGANIZED HEALTH CARE EDUCATION/TRAINING PROGRAM | Admitting: STUDENT IN AN ORGANIZED HEALTH CARE EDUCATION/TRAINING PROGRAM
Payer: MEDICARE

## 2024-06-12 ENCOUNTER — APPOINTMENT (OUTPATIENT)
Dept: RADIOLOGY | Facility: HOSPITAL | Age: 89
End: 2024-06-12
Payer: MEDICARE

## 2024-06-12 ENCOUNTER — HOSPITAL ENCOUNTER (INPATIENT)
Facility: HOSPITAL | Age: 89
LOS: 1 days | Discharge: ANOTHER HOSPITAL | End: 2024-06-13
Attending: EMERGENCY MEDICINE | Admitting: STUDENT IN AN ORGANIZED HEALTH CARE EDUCATION/TRAINING PROGRAM
Payer: MEDICARE

## 2024-06-12 DIAGNOSIS — K56.609 SMALL BOWEL OBSTRUCTION (MULTI): Primary | ICD-10-CM

## 2024-06-12 LAB
ALBUMIN SERPL BCP-MCNC: 4.1 G/DL (ref 3.4–5)
ALP SERPL-CCNC: 72 U/L (ref 33–136)
ALT SERPL W P-5'-P-CCNC: 7 U/L (ref 10–52)
ANION GAP SERPL CALC-SCNC: 13 MMOL/L (ref 10–20)
AST SERPL W P-5'-P-CCNC: 12 U/L (ref 9–39)
BASOPHILS # BLD AUTO: 0.01 X10*3/UL (ref 0–0.1)
BASOPHILS NFR BLD AUTO: 0.1 %
BILIRUB SERPL-MCNC: 0.3 MG/DL (ref 0–1.2)
BUN SERPL-MCNC: 20 MG/DL (ref 6–23)
CALCIUM SERPL-MCNC: 9.4 MG/DL (ref 8.6–10.3)
CHLORIDE SERPL-SCNC: 105 MMOL/L (ref 98–107)
CO2 SERPL-SCNC: 25 MMOL/L (ref 21–32)
CREAT SERPL-MCNC: 1.12 MG/DL (ref 0.5–1.3)
EGFRCR SERPLBLD CKD-EPI 2021: 61 ML/MIN/1.73M*2
EOSINOPHIL # BLD AUTO: 0.02 X10*3/UL (ref 0–0.4)
EOSINOPHIL NFR BLD AUTO: 0.2 %
ERYTHROCYTE [DISTWIDTH] IN BLOOD BY AUTOMATED COUNT: 13 % (ref 11.5–14.5)
GLUCOSE SERPL-MCNC: 125 MG/DL (ref 74–99)
HCT VFR BLD AUTO: 39.4 % (ref 41–52)
HGB BLD-MCNC: 12.4 G/DL (ref 13.5–17.5)
IMM GRANULOCYTES # BLD AUTO: 0.03 X10*3/UL (ref 0–0.5)
IMM GRANULOCYTES NFR BLD AUTO: 0.3 % (ref 0–0.9)
LACTATE SERPL-SCNC: 1.3 MMOL/L (ref 0.4–2)
LIPASE SERPL-CCNC: 20 U/L (ref 9–82)
LYMPHOCYTES # BLD AUTO: 1.11 X10*3/UL (ref 0.8–3)
LYMPHOCYTES NFR BLD AUTO: 9.3 %
MCH RBC QN AUTO: 31.2 PG (ref 26–34)
MCHC RBC AUTO-ENTMCNC: 31.5 G/DL (ref 32–36)
MCV RBC AUTO: 99 FL (ref 80–100)
MONOCYTES # BLD AUTO: 0.69 X10*3/UL (ref 0.05–0.8)
MONOCYTES NFR BLD AUTO: 5.8 %
NEUTROPHILS # BLD AUTO: 10.06 X10*3/UL (ref 1.6–5.5)
NEUTROPHILS NFR BLD AUTO: 84.3 %
NRBC BLD-RTO: 0 /100 WBCS (ref 0–0)
PLATELET # BLD AUTO: 231 X10*3/UL (ref 150–450)
POTASSIUM SERPL-SCNC: 4.2 MMOL/L (ref 3.5–5.3)
PROT SERPL-MCNC: 6.7 G/DL (ref 6.4–8.2)
RBC # BLD AUTO: 3.98 X10*6/UL (ref 4.5–5.9)
SODIUM SERPL-SCNC: 139 MMOL/L (ref 136–145)
WBC # BLD AUTO: 11.9 X10*3/UL (ref 4.4–11.3)

## 2024-06-12 PROCEDURE — 80053 COMPREHEN METABOLIC PANEL: CPT | Performed by: EMERGENCY MEDICINE

## 2024-06-12 PROCEDURE — 2500000004 HC RX 250 GENERAL PHARMACY W/ HCPCS (ALT 636 FOR OP/ED): Performed by: EMERGENCY MEDICINE

## 2024-06-12 PROCEDURE — 1100000001 HC PRIVATE ROOM DAILY

## 2024-06-12 PROCEDURE — 74177 CT ABD & PELVIS W/CONTRAST: CPT | Performed by: STUDENT IN AN ORGANIZED HEALTH CARE EDUCATION/TRAINING PROGRAM

## 2024-06-12 PROCEDURE — 74018 RADEX ABDOMEN 1 VIEW: CPT | Performed by: RADIOLOGY

## 2024-06-12 PROCEDURE — 99285 EMERGENCY DEPT VISIT HI MDM: CPT | Mod: 25

## 2024-06-12 PROCEDURE — 74018 RADEX ABDOMEN 1 VIEW: CPT

## 2024-06-12 PROCEDURE — 85025 COMPLETE CBC W/AUTO DIFF WBC: CPT | Performed by: EMERGENCY MEDICINE

## 2024-06-12 PROCEDURE — 99222 1ST HOSP IP/OBS MODERATE 55: CPT | Performed by: SURGERY

## 2024-06-12 PROCEDURE — 2500000004 HC RX 250 GENERAL PHARMACY W/ HCPCS (ALT 636 FOR OP/ED): Performed by: STUDENT IN AN ORGANIZED HEALTH CARE EDUCATION/TRAINING PROGRAM

## 2024-06-12 PROCEDURE — 99223 1ST HOSP IP/OBS HIGH 75: CPT | Performed by: STUDENT IN AN ORGANIZED HEALTH CARE EDUCATION/TRAINING PROGRAM

## 2024-06-12 PROCEDURE — 96374 THER/PROPH/DIAG INJ IV PUSH: CPT | Mod: 59

## 2024-06-12 PROCEDURE — 0D9670Z DRAINAGE OF STOMACH WITH DRAINAGE DEVICE, VIA NATURAL OR ARTIFICIAL OPENING: ICD-10-PCS | Performed by: PHARMACIST

## 2024-06-12 PROCEDURE — 2550000001 HC RX 255 CONTRASTS: Performed by: EMERGENCY MEDICINE

## 2024-06-12 PROCEDURE — 74177 CT ABD & PELVIS W/CONTRAST: CPT

## 2024-06-12 PROCEDURE — 71045 X-RAY EXAM CHEST 1 VIEW: CPT | Performed by: RADIOLOGY

## 2024-06-12 PROCEDURE — 36415 COLL VENOUS BLD VENIPUNCTURE: CPT | Performed by: EMERGENCY MEDICINE

## 2024-06-12 PROCEDURE — 74176 CT ABD & PELVIS W/O CONTRAST: CPT

## 2024-06-12 PROCEDURE — 74176 CT ABD & PELVIS W/O CONTRAST: CPT | Performed by: STUDENT IN AN ORGANIZED HEALTH CARE EDUCATION/TRAINING PROGRAM

## 2024-06-12 PROCEDURE — 83605 ASSAY OF LACTIC ACID: CPT | Performed by: EMERGENCY MEDICINE

## 2024-06-12 PROCEDURE — 83690 ASSAY OF LIPASE: CPT | Performed by: EMERGENCY MEDICINE

## 2024-06-12 PROCEDURE — 96361 HYDRATE IV INFUSION ADD-ON: CPT

## 2024-06-12 RX ORDER — DEXTROSE 50 % IN WATER (D50W) INTRAVENOUS SYRINGE
12.5
Status: DISCONTINUED | OUTPATIENT
Start: 2024-06-12 | End: 2024-06-14 | Stop reason: HOSPADM

## 2024-06-12 RX ORDER — METOPROLOL SUCCINATE 25 MG/1
12.5 TABLET, EXTENDED RELEASE ORAL DAILY
Status: DISCONTINUED | OUTPATIENT
Start: 2024-06-13 | End: 2024-06-14 | Stop reason: HOSPADM

## 2024-06-12 RX ORDER — ONDANSETRON HYDROCHLORIDE 2 MG/ML
4 INJECTION, SOLUTION INTRAVENOUS EVERY 6 HOURS PRN
Status: DISCONTINUED | OUTPATIENT
Start: 2024-06-12 | End: 2024-06-14 | Stop reason: HOSPADM

## 2024-06-12 RX ORDER — MORPHINE SULFATE 2 MG/ML
2 INJECTION, SOLUTION INTRAMUSCULAR; INTRAVENOUS EVERY 6 HOURS PRN
Status: DISCONTINUED | OUTPATIENT
Start: 2024-06-12 | End: 2024-06-14 | Stop reason: HOSPADM

## 2024-06-12 RX ORDER — DEXTROSE 50 % IN WATER (D50W) INTRAVENOUS SYRINGE
25
Status: DISCONTINUED | OUTPATIENT
Start: 2024-06-12 | End: 2024-06-14 | Stop reason: HOSPADM

## 2024-06-12 RX ORDER — PROPYLTHIOURACIL 50 MG/1
50 TABLET ORAL 3 TIMES DAILY
Status: DISCONTINUED | OUTPATIENT
Start: 2024-06-12 | End: 2024-06-13

## 2024-06-12 RX ORDER — INSULIN LISPRO 100 [IU]/ML
0-5 INJECTION, SOLUTION INTRAVENOUS; SUBCUTANEOUS
Status: DISCONTINUED | OUTPATIENT
Start: 2024-06-13 | End: 2024-06-14 | Stop reason: HOSPADM

## 2024-06-12 RX ORDER — SODIUM CHLORIDE 9 MG/ML
75 INJECTION, SOLUTION INTRAVENOUS CONTINUOUS
Status: DISCONTINUED | OUTPATIENT
Start: 2024-06-12 | End: 2024-06-14 | Stop reason: HOSPADM

## 2024-06-12 RX ORDER — POLYETHYLENE GLYCOL 3350 17 G/17G
17 POWDER, FOR SOLUTION ORAL DAILY
Status: DISCONTINUED | OUTPATIENT
Start: 2024-06-13 | End: 2024-06-14 | Stop reason: HOSPADM

## 2024-06-12 RX ORDER — INSULIN GLARGINE 100 [IU]/ML
15 INJECTION, SOLUTION SUBCUTANEOUS EVERY MORNING
Status: DISCONTINUED | OUTPATIENT
Start: 2024-06-13 | End: 2024-06-14 | Stop reason: HOSPADM

## 2024-06-12 RX ORDER — METOPROLOL TARTRATE 1 MG/ML
5 INJECTION, SOLUTION INTRAVENOUS EVERY 6 HOURS PRN
Status: DISCONTINUED | OUTPATIENT
Start: 2024-06-12 | End: 2024-06-14 | Stop reason: HOSPADM

## 2024-06-12 RX ORDER — CLOPIDOGREL BISULFATE 75 MG/1
75 TABLET ORAL DAILY
Status: DISCONTINUED | OUTPATIENT
Start: 2024-06-13 | End: 2024-06-12

## 2024-06-12 RX ORDER — KETOROLAC TROMETHAMINE 30 MG/ML
15 INJECTION, SOLUTION INTRAMUSCULAR; INTRAVENOUS ONCE
Status: COMPLETED | OUTPATIENT
Start: 2024-06-12 | End: 2024-06-12

## 2024-06-12 RX ORDER — PRAVASTATIN SODIUM 20 MG/1
20 TABLET ORAL DAILY
Status: DISCONTINUED | OUTPATIENT
Start: 2024-06-13 | End: 2024-06-14 | Stop reason: HOSPADM

## 2024-06-12 RX ORDER — FINASTERIDE 5 MG/1
5 TABLET, FILM COATED ORAL DAILY
Status: DISCONTINUED | OUTPATIENT
Start: 2024-06-13 | End: 2024-06-14 | Stop reason: HOSPADM

## 2024-06-12 RX ORDER — FERROUS SULFATE 325(65) MG
1 TABLET ORAL
Status: DISCONTINUED | OUTPATIENT
Start: 2024-06-13 | End: 2024-06-14 | Stop reason: HOSPADM

## 2024-06-12 RX ORDER — GLIPIZIDE 2.5 MG/1
2.5 TABLET, EXTENDED RELEASE ORAL
Status: DISCONTINUED | OUTPATIENT
Start: 2024-06-13 | End: 2024-06-14 | Stop reason: HOSPADM

## 2024-06-12 SDOH — SOCIAL STABILITY: SOCIAL INSECURITY: DO YOU FEEL ANYONE HAS EXPLOITED OR TAKEN ADVANTAGE OF YOU FINANCIALLY OR OF YOUR PERSONAL PROPERTY?: NO

## 2024-06-12 SDOH — SOCIAL STABILITY: SOCIAL INSECURITY: HAVE YOU HAD ANY THOUGHTS OF HARMING ANYONE ELSE?: NO

## 2024-06-12 SDOH — SOCIAL STABILITY: SOCIAL INSECURITY: DO YOU FEEL UNSAFE GOING BACK TO THE PLACE WHERE YOU ARE LIVING?: NO

## 2024-06-12 SDOH — SOCIAL STABILITY: SOCIAL INSECURITY: HAS ANYONE EVER THREATENED TO HURT YOUR FAMILY OR YOUR PETS?: NO

## 2024-06-12 SDOH — SOCIAL STABILITY: SOCIAL INSECURITY: HAVE YOU HAD THOUGHTS OF HARMING ANYONE ELSE?: NO

## 2024-06-12 SDOH — SOCIAL STABILITY: SOCIAL INSECURITY: ARE YOU OR HAVE YOU BEEN THREATENED OR ABUSED PHYSICALLY, EMOTIONALLY, OR SEXUALLY BY ANYONE?: NO

## 2024-06-12 SDOH — SOCIAL STABILITY: SOCIAL INSECURITY: ARE THERE ANY APPARENT SIGNS OF INJURIES/BEHAVIORS THAT COULD BE RELATED TO ABUSE/NEGLECT?: NO

## 2024-06-12 SDOH — SOCIAL STABILITY: SOCIAL INSECURITY: DOES ANYONE TRY TO KEEP YOU FROM HAVING/CONTACTING OTHER FRIENDS OR DOING THINGS OUTSIDE YOUR HOME?: NO

## 2024-06-12 SDOH — SOCIAL STABILITY: SOCIAL INSECURITY: ABUSE: ADULT

## 2024-06-12 SDOH — SOCIAL STABILITY: SOCIAL INSECURITY: WERE YOU ABLE TO COMPLETE ALL THE BEHAVIORAL HEALTH SCREENINGS?: YES

## 2024-06-12 ASSESSMENT — PATIENT HEALTH QUESTIONNAIRE - PHQ9
1. LITTLE INTEREST OR PLEASURE IN DOING THINGS: NOT AT ALL
2. FEELING DOWN, DEPRESSED OR HOPELESS: NOT AT ALL
SUM OF ALL RESPONSES TO PHQ9 QUESTIONS 1 & 2: 0

## 2024-06-12 ASSESSMENT — ACTIVITIES OF DAILY LIVING (ADL)
TOILETING: NEEDS ASSISTANCE
FEEDING YOURSELF: NEEDS ASSISTANCE
HEARING - RIGHT EAR: DIFFICULTY WITH NOISE
PATIENT'S MEMORY ADEQUATE TO SAFELY COMPLETE DAILY ACTIVITIES?: YES
GROOMING: NEEDS ASSISTANCE
LACK_OF_TRANSPORTATION: PATIENT DECLINED
ADEQUATE_TO_COMPLETE_ADL: YES
WALKS IN HOME: NEEDS ASSISTANCE
DRESSING YOURSELF: NEEDS ASSISTANCE
HEARING - LEFT EAR: DIFFICULTY WITH NOISE
ASSISTIVE_DEVICE: WALKER
JUDGMENT_ADEQUATE_SAFELY_COMPLETE_DAILY_ACTIVITIES: YES
BATHING: NEEDS ASSISTANCE

## 2024-06-12 ASSESSMENT — PAIN DESCRIPTION - LOCATION: LOCATION: ABDOMEN

## 2024-06-12 ASSESSMENT — COGNITIVE AND FUNCTIONAL STATUS - GENERAL: PATIENT BASELINE BEDBOUND: UNABLE TO ASSESS AT THIS TIME

## 2024-06-12 ASSESSMENT — COLUMBIA-SUICIDE SEVERITY RATING SCALE - C-SSRS
2. HAVE YOU ACTUALLY HAD ANY THOUGHTS OF KILLING YOURSELF?: NO
6. HAVE YOU EVER DONE ANYTHING, STARTED TO DO ANYTHING, OR PREPARED TO DO ANYTHING TO END YOUR LIFE?: NO
1. IN THE PAST MONTH, HAVE YOU WISHED YOU WERE DEAD OR WISHED YOU COULD GO TO SLEEP AND NOT WAKE UP?: NO

## 2024-06-12 ASSESSMENT — LIFESTYLE VARIABLES
AUDIT-C TOTAL SCORE: -1
SKIP TO QUESTIONS 9-10: 0
AUDIT-C TOTAL SCORE: -1
HOW OFTEN DO YOU HAVE A DRINK CONTAINING ALCOHOL: PATIENT DECLINED
HOW OFTEN DO YOU HAVE 6 OR MORE DRINKS ON ONE OCCASION: PATIENT DECLINED
HOW MANY STANDARD DRINKS CONTAINING ALCOHOL DO YOU HAVE ON A TYPICAL DAY: PATIENT DECLINED

## 2024-06-12 ASSESSMENT — PAIN SCALES - GENERAL
PAINLEVEL_OUTOF10: 0 - NO PAIN
PAINLEVEL_OUTOF10: 7
PAINLEVEL_OUTOF10: 4
PAINLEVEL_OUTOF10: 8
PAINLEVEL_OUTOF10: 9

## 2024-06-12 ASSESSMENT — PAIN - FUNCTIONAL ASSESSMENT
PAIN_FUNCTIONAL_ASSESSMENT: 0-10
PAIN_FUNCTIONAL_ASSESSMENT: 0-10

## 2024-06-12 ASSESSMENT — PAIN DESCRIPTION - PAIN TYPE: TYPE: ACUTE PAIN

## 2024-06-12 NOTE — CONSULTS
Reason For Consult  Possible small bowel obstruction    History Of Present Illness  Enrike Crespo is a 89 y.o. male presenting with possible small bowel obstruction patient states he woke up this morning and just did not feel well.  He had discomfort along his lower abdomen that wraps around to his back.  He states it felt like his previous kidney stones and asked his neighbor to bring him to the hospital as he was having some nausea.  The neighbor called the squad and he was brought in.  CT scan of the abdomen and pelvis was done without contrast as a stone study and it raises the possibility of a closed-loop obstruction.  As I went to the patient's room there was a woman sitting next to mom and she states that she is her his daughter's friend.  His daughter is hospitalized up at  main campus with a failed bone marrow transplant for AML that they are trying to get her back in remission.  Patient is sitting comfortably in bed denying any pain.  NG tube is in position..     Past Medical History  He has a past medical history of Bleeding hemorrhoid, Cardiac arrest (Multi), Diabetes (Multi), and Heart failure (Multi).    Surgical History  He has a past surgical history that includes Cardiac defibrillator placement.  No abdominal surgery    Social History  He reports that he has been smoking cigarettes. He has a 75 pack-year smoking history. He has never used smokeless tobacco. He reports that he does not currently use alcohol. He reports that he does not use drugs.    Family History  Family History   Problem Relation Name Age of Onset    Heart disease Mother          Allergies  Penicillins    Review of Systems  Patient denies any neurologic symptoms.  He denies any chest pain or shortness of breath.  He currently denies any abdominal pain  He states his last bowel movement was either yesterday or this morning     Physical Exam  Awake alert no acute distress  Lungs are clear  Heart is regular in rate  Abdomen is  "soft.  I am unable to elicit any tenderness even with very deep palpation and he has no peritoneal signs.  I do not feel any masses.     Last Recorded Vitals  Blood pressure 115/60, pulse 70, temperature 36.6 °C (97.8 °F), temperature source Oral, resp. rate 20, height 1.778 m (5' 10\"), weight 73.5 kg (162 lb), SpO2 98%.    Relevant Results  White count is 11.  Lactate is 1.3.  CT scan raise the possibility of closed-loop obstruction.  I called the radiologist to discuss this with him as I could not really see it.  We went over the films and he suggested we repeat the CT scan with contrast.     Assessment/Plan     Possible small bowel obstruction versus a recently passed kidney stone.  I have explained to the patient and his Jayashree the rationale for a repeat CT scan despite the need for contrast as well as more radiation.  I described the fact that the closed-loop obstruction is operative disease but he has no pain and a negative exploration at 89 it and someone who smokes and has a cardiac history is not without significant complications.  They are in agreement.  CT scan is ordered and we will proceed from there.  NG tube is in and I have told him he can have ice chips and popsicles.    I spent 30 minutes in the professional and overall care of this patient.      Rachna Laurent MD  CT repeated with contrast shows increase in the amount of small bowel distention.  You could actually see where the small bowel is going through a defect.  The patient continues asymptomatic and that he does not have any pain.  His NG tube is put out 800 mL so far.  On further review of his record his comorbidities probably exceed the ability of this institution to supply adequate care should he need an operation.  Call has been placed to the transfer line.  Discussed with patient.  Will call his family.  His daughter is actually inpatient at [facility].  "

## 2024-06-12 NOTE — ED PROVIDER NOTES
HPI   Chief Complaint   Patient presents with    Abdominal Pain     Lower abdominal pain along belt line, bilaterally that began this morning. Complains of low back pain and nausea as well. Patient reports history of kidney stones with lithotripsy and stents. Patient reports he urinated this morning without difficulty. No fevers, vomiting or diarrhea.        Patient presents to the emergency department secondary to abdominal pain as well as flank pain.  He states this began a few hours prior to arrival.  He has a history of kidney stones and states that this feels similar.  Denies dysuria.  Denies vomiting.                          Avila Coma Scale Score: 15                     Patient History   Past Medical History:   Diagnosis Date    Bleeding hemorrhoid     Cardiac arrest (Multi)     Diabetes (Multi)     Heart failure (Multi)      Past Surgical History:   Procedure Laterality Date    CARDIAC DEFIBRILLATOR PLACEMENT       Family History   Problem Relation Name Age of Onset    Heart disease Mother       Social History     Tobacco Use    Smoking status: Every Day     Current packs/day: 1.00     Average packs/day: 1 pack/day for 75.0 years (75.0 ttl pk-yrs)     Types: Cigarettes    Smokeless tobacco: Never   Vaping Use    Vaping status: Never Used   Substance Use Topics    Alcohol use: Not Currently    Drug use: Never       Physical Exam   ED Triage Vitals [06/12/24 1449]   Temperature Heart Rate Respirations BP   36.6 °C (97.8 °F) 57 18 148/72      Pulse Ox Temp Source Heart Rate Source Patient Position   98 % Oral Monitor Sitting      BP Location FiO2 (%)     Left arm --       Physical Exam  Vitals and nursing note reviewed.   Constitutional:       General: He is not in acute distress.     Appearance: Normal appearance. He is normal weight. He is not ill-appearing, toxic-appearing or diaphoretic.      Comments: Hard of hearing.  Appears elderly and somewhat debilitated.  Not confused.  Provides a full history.    HENT:      Head: Normocephalic and atraumatic.      Nose: Nose normal. No rhinorrhea.   Neck:      Comments: Trachea is midline  Cardiovascular:      Rate and Rhythm: Normal rate and regular rhythm.      Heart sounds: No murmur heard.  Pulmonary:      Effort: Pulmonary effort is normal.      Breath sounds: Normal breath sounds. No wheezing.   Abdominal:      General: Abdomen is flat. Bowel sounds are normal. There is no distension.      Palpations: Abdomen is soft.      Tenderness: There is generalized abdominal tenderness. There is no right CVA tenderness or left CVA tenderness.   Musculoskeletal:         General: Normal range of motion.      Cervical back: Normal range of motion.   Skin:     General: Skin is warm and dry.      Findings: No rash.   Neurological:      General: No focal deficit present.      Mental Status: He is alert and oriented to person, place, and time. Mental status is at baseline.   Psychiatric:         Mood and Affect: Mood normal.         Behavior: Behavior normal.         Thought Content: Thought content normal.         Judgment: Judgment normal.         ED Course & MDM   Diagnoses as of 06/12/24 1736   Small bowel obstruction (Multi)       Medical Decision Making  CAT scan findings were explained to the patient and his daughter.  An NG tube will be placed by the nursing staff.  Patient case was discussed with general surgery on-call, Dr. Giordano, who agreed with this treatment and wished for the patient to be admitted to the medical service and she will see the patient in consultation.  Case was discussed with the hospitalist who will admit in stable condition.        Procedure  Procedures     Clarence Hernandez, DO  06/12/24 1734

## 2024-06-13 ENCOUNTER — APPOINTMENT (OUTPATIENT)
Dept: RADIOLOGY | Facility: HOSPITAL | Age: 89
End: 2024-06-13
Payer: MEDICARE

## 2024-06-13 ENCOUNTER — HOSPITAL ENCOUNTER (INPATIENT)
Facility: HOSPITAL | Age: 89
End: 2024-06-13
Attending: STUDENT IN AN ORGANIZED HEALTH CARE EDUCATION/TRAINING PROGRAM | Admitting: SURGERY
Payer: MEDICARE

## 2024-06-13 VITALS
RESPIRATION RATE: 18 BRPM | SYSTOLIC BLOOD PRESSURE: 121 MMHG | HEART RATE: 75 BPM | WEIGHT: 162 LBS | TEMPERATURE: 98.1 F | OXYGEN SATURATION: 92 % | BODY MASS INDEX: 23.19 KG/M2 | HEIGHT: 70 IN | DIASTOLIC BLOOD PRESSURE: 67 MMHG

## 2024-06-13 DIAGNOSIS — Z95.810 CARDIAC DEFIBRILLATOR IN PLACE: ICD-10-CM

## 2024-06-13 DIAGNOSIS — K56.609 SMALL BOWEL OBSTRUCTION (MULTI): Primary | ICD-10-CM

## 2024-06-13 DIAGNOSIS — I42.9 CARDIOMYOPATHY, UNSPECIFIED TYPE (MULTI): ICD-10-CM

## 2024-06-13 LAB
ANION GAP SERPL CALC-SCNC: 15 MMOL/L (ref 10–20)
APPEARANCE UR: CLEAR
BACTERIA #/AREA URNS AUTO: ABNORMAL /HPF
BILIRUB UR STRIP.AUTO-MCNC: NEGATIVE MG/DL
BUN SERPL-MCNC: 22 MG/DL (ref 6–23)
CALCIUM SERPL-MCNC: 8.9 MG/DL (ref 8.6–10.3)
CHLORIDE SERPL-SCNC: 104 MMOL/L (ref 98–107)
CO2 SERPL-SCNC: 27 MMOL/L (ref 21–32)
COLOR UR: YELLOW
CREAT SERPL-MCNC: 1.05 MG/DL (ref 0.5–1.3)
EGFRCR SERPLBLD CKD-EPI 2021: 66 ML/MIN/1.73M*2
ERYTHROCYTE [DISTWIDTH] IN BLOOD BY AUTOMATED COUNT: 13 % (ref 11.5–14.5)
GLUCOSE BLD MANUAL STRIP-MCNC: 120 MG/DL (ref 74–99)
GLUCOSE BLD MANUAL STRIP-MCNC: 163 MG/DL (ref 74–99)
GLUCOSE BLD MANUAL STRIP-MCNC: 78 MG/DL (ref 74–99)
GLUCOSE SERPL-MCNC: 129 MG/DL (ref 74–99)
GLUCOSE UR STRIP.AUTO-MCNC: NORMAL MG/DL
HCT VFR BLD AUTO: 42.9 % (ref 41–52)
HGB BLD-MCNC: 13.5 G/DL (ref 13.5–17.5)
KETONES UR STRIP.AUTO-MCNC: ABNORMAL MG/DL
LEUKOCYTE ESTERASE UR QL STRIP.AUTO: ABNORMAL
MAGNESIUM SERPL-MCNC: 1.93 MG/DL (ref 1.6–2.4)
MCH RBC QN AUTO: 30.9 PG (ref 26–34)
MCHC RBC AUTO-ENTMCNC: 31.5 G/DL (ref 32–36)
MCV RBC AUTO: 98 FL (ref 80–100)
MUCOUS THREADS #/AREA URNS AUTO: ABNORMAL /LPF
NITRITE UR QL STRIP.AUTO: NEGATIVE
NRBC BLD-RTO: 0 /100 WBCS (ref 0–0)
PH UR STRIP.AUTO: 6 [PH]
PLATELET # BLD AUTO: 273 X10*3/UL (ref 150–450)
POTASSIUM SERPL-SCNC: 3.9 MMOL/L (ref 3.5–5.3)
PROT UR STRIP.AUTO-MCNC: ABNORMAL MG/DL
RBC # BLD AUTO: 4.37 X10*6/UL (ref 4.5–5.9)
RBC # UR STRIP.AUTO: ABNORMAL /UL
RBC #/AREA URNS AUTO: >20 /HPF
SODIUM SERPL-SCNC: 142 MMOL/L (ref 136–145)
SP GR UR STRIP.AUTO: >1.03
UROBILINOGEN UR STRIP.AUTO-MCNC: NORMAL MG/DL
WBC # BLD AUTO: 9.3 X10*3/UL (ref 4.4–11.3)
WBC #/AREA URNS AUTO: >50 /HPF

## 2024-06-13 PROCEDURE — 2500000002 HC RX 250 W HCPCS SELF ADMINISTERED DRUGS (ALT 637 FOR MEDICARE OP, ALT 636 FOR OP/ED): Performed by: STUDENT IN AN ORGANIZED HEALTH CARE EDUCATION/TRAINING PROGRAM

## 2024-06-13 PROCEDURE — 2500000001 HC RX 250 WO HCPCS SELF ADMINISTERED DRUGS (ALT 637 FOR MEDICARE OP): Performed by: STUDENT IN AN ORGANIZED HEALTH CARE EDUCATION/TRAINING PROGRAM

## 2024-06-13 PROCEDURE — 83735 ASSAY OF MAGNESIUM: CPT | Performed by: NURSE PRACTITIONER

## 2024-06-13 PROCEDURE — 87086 URINE CULTURE/COLONY COUNT: CPT | Mod: SAMLAB | Performed by: EMERGENCY MEDICINE

## 2024-06-13 PROCEDURE — 82947 ASSAY GLUCOSE BLOOD QUANT: CPT

## 2024-06-13 PROCEDURE — 2500000004 HC RX 250 GENERAL PHARMACY W/ HCPCS (ALT 636 FOR OP/ED): Performed by: STUDENT IN AN ORGANIZED HEALTH CARE EDUCATION/TRAINING PROGRAM

## 2024-06-13 PROCEDURE — 9420000001 HC RT PATIENT EDUCATION 5 MIN

## 2024-06-13 PROCEDURE — 80048 BASIC METABOLIC PNL TOTAL CA: CPT | Performed by: STUDENT IN AN ORGANIZED HEALTH CARE EDUCATION/TRAINING PROGRAM

## 2024-06-13 PROCEDURE — 2500000004 HC RX 250 GENERAL PHARMACY W/ HCPCS (ALT 636 FOR OP/ED): Performed by: NURSE PRACTITIONER

## 2024-06-13 PROCEDURE — 74018 RADEX ABDOMEN 1 VIEW: CPT

## 2024-06-13 PROCEDURE — 85027 COMPLETE CBC AUTOMATED: CPT | Performed by: STUDENT IN AN ORGANIZED HEALTH CARE EDUCATION/TRAINING PROGRAM

## 2024-06-13 PROCEDURE — 74022 RADEX COMPL AQT ABD SERIES: CPT | Performed by: RADIOLOGY

## 2024-06-13 PROCEDURE — 94667 MNPJ CHEST WALL 1ST: CPT

## 2024-06-13 PROCEDURE — 2500000001 HC RX 250 WO HCPCS SELF ADMINISTERED DRUGS (ALT 637 FOR MEDICARE OP): Performed by: HOSPITALIST

## 2024-06-13 PROCEDURE — 99232 SBSQ HOSP IP/OBS MODERATE 35: CPT | Performed by: SURGERY

## 2024-06-13 PROCEDURE — 74022 RADEX COMPL AQT ABD SERIES: CPT

## 2024-06-13 PROCEDURE — 94668 MNPJ CHEST WALL SBSQ: CPT

## 2024-06-13 PROCEDURE — 36415 COLL VENOUS BLD VENIPUNCTURE: CPT | Performed by: STUDENT IN AN ORGANIZED HEALTH CARE EDUCATION/TRAINING PROGRAM

## 2024-06-13 PROCEDURE — 81003 URINALYSIS AUTO W/O SCOPE: CPT | Performed by: EMERGENCY MEDICINE

## 2024-06-13 PROCEDURE — 74018 RADEX ABDOMEN 1 VIEW: CPT | Performed by: RADIOLOGY

## 2024-06-13 RX ORDER — METHIMAZOLE 5 MG/1
7.5 TABLET ORAL DAILY
Status: DISCONTINUED | OUTPATIENT
Start: 2024-06-13 | End: 2024-06-14 | Stop reason: HOSPADM

## 2024-06-13 RX ORDER — DIPHENHYDRAMINE HYDROCHLORIDE 50 MG/ML
25 INJECTION INTRAMUSCULAR; INTRAVENOUS DAILY PRN
Status: DISCONTINUED | OUTPATIENT
Start: 2024-06-13 | End: 2024-06-14 | Stop reason: HOSPADM

## 2024-06-13 RX ORDER — CEFTRIAXONE 1 G/50ML
1 INJECTION, SOLUTION INTRAVENOUS EVERY 24 HOURS
Status: DISCONTINUED | OUTPATIENT
Start: 2024-06-13 | End: 2024-06-14 | Stop reason: HOSPADM

## 2024-06-13 ASSESSMENT — PAIN SCALES - GENERAL
PAINLEVEL_OUTOF10: 1
PAINLEVEL_OUTOF10: 5 - MODERATE PAIN

## 2024-06-13 ASSESSMENT — ACTIVITIES OF DAILY LIVING (ADL): LACK_OF_TRANSPORTATION: PATIENT UNABLE TO ANSWER

## 2024-06-13 ASSESSMENT — PAIN - FUNCTIONAL ASSESSMENT
PAIN_FUNCTIONAL_ASSESSMENT: 0-10
PAIN_FUNCTIONAL_ASSESSMENT: 0-10

## 2024-06-13 NOTE — H&P
History Of Present Illness  Enrike Crespo is a 89 y.o. male With past history of sick sinus syndrome status post pacemaker, hypertension, seizure, diabetes who presented to the ER with abdominal pain.  On presentation to the ER temperature was 36.6, pulse 57, respiratory rate 18, blood pressure 148/72 and he was saturating 98% on room air.  Blood work showed mild leukocytosis, hemoglobin was 12.4, creatinine was normal, lactic acid was 1.3.  CT abdomen without contrast showed findings suspicious for small bowel obstruction possibly closed-loop with several fluid-filled and mildly dilated loops of small bowel present in the right mid and lower abdomen.  An NG tube was put in the ER.  Patient says that his pain started all of a sudden today he was just sitting, it was in his lower abdomen bandlike and radiated to the back, he did have some nausea but did not throw up.  No fevers, chills, chest pain or shortness of breath.  Says that last bowel movement was on Sunday but is normal for him to have 1 bowel movement every 3 days.  Denies any history of abdominal surgeries denies any history of bowel obstruction in the past.     Past Medical History  Past Medical History:   Diagnosis Date    Bleeding hemorrhoid     Cardiac arrest (Multi)     Diabetes (Multi)     Heart failure (Multi)        Surgical History  Past Surgical History:   Procedure Laterality Date    CARDIAC DEFIBRILLATOR PLACEMENT          Social History  He reports that he has been smoking cigarettes. He has a 75 pack-year smoking history. He has never used smokeless tobacco. He reports that he does not currently use alcohol. He reports that he does not use drugs.    Family History  Family History   Problem Relation Name Age of Onset    Heart disease Mother          Allergies  Penicillins    Review of Systems  As per HPI, comprehensive review of systems performed  Physical Exam  Constitutional:       Appearance: Normal appearance.   HENT:      Head:  "Normocephalic.      Mouth/Throat:      Mouth: Mucous membranes are dry.   Eyes:      Extraocular Movements: Extraocular movements intact.      Conjunctiva/sclera: Conjunctivae normal.   Cardiovascular:      Rate and Rhythm: Normal rate.      Heart sounds: No murmur heard.  Pulmonary:      Effort: Pulmonary effort is normal.      Comments: Decreased breathing sounds bilaterally  Abdominal:      Palpations: Abdomen is soft. There is no mass.      Comments: Mild lower abdominal tenderness, no rebound or guarding   Musculoskeletal:      Right lower leg: Edema present.      Left lower leg: Edema present.   Neurological:      General: No focal deficit present.      Mental Status: He is alert.   Psychiatric:         Mood and Affect: Mood normal.          Last Recorded Vitals  Blood pressure 120/74, pulse 86, temperature 36.4 °C (97.6 °F), temperature source Oral, resp. rate 16, height 1.778 m (5' 10\"), weight 73.5 kg (162 lb), SpO2 95%.    Relevant Results             Assessment/Plan   Principal Problem:    Small bowel obstruction (Multi)      89-year-old male with past medical history of sick sinus syndrome, CHF, diabetes, hemorrhoids, status post defibrillator admitted with acute small bowel obstruction    I will hold his oral hypoglycemics since the patient is n.p.o.  Continue Lantus, start insulin sliding scale  Depending on blood sugars we may need to decrease the dose  Will also hold Plavix and Eliquis in case he needs surgery  General surgery has been consulted  Symptomatic treatment  Pain control keep him n.p.o. for now  Keep NGT to suction  Resume home meds when able  IV fluid hydration  DVT prophylaxis   CODE STATUS is full          Abdi Prince MD    "

## 2024-06-13 NOTE — PROGRESS NOTES
06/13/24 1305   Discharge Planning   Living Arrangements Alone   Support Systems Children;/;Friends/neighbors;Home care staff   Assistance Needed assist x1   Type of Residence Private residence   Number of Stairs to Enter Residence 4   Number of Stairs Within Residence 0   Do you have animals or pets at home? Yes   Type of Animals or Pets 1 dog   Who is requesting discharge planning? Provider   Home or Post Acute Services Other (Comment)  (Transfer to [facility])   Patient expects to be discharged to: TBD   Does the patient need discharge transport arranged? Yes   RoundTrip coordination needed? Yes   Has discharge transport been arranged? No   Financial Resource Strain   How hard is it for you to pay for the very basics like food, housing, medical care, and heating? Pt Unable   Housing Stability   In the last 12 months, was there a time when you were not able to pay the mortgage or rent on time? Pt Unable   In the last 12 months, how many places have you lived? 1   In the last 12 months, was there a time when you did not have a steady place to sleep or slept in a shelter (including now)? Pt Unable   Transportation Needs   In the past 12 months, has lack of transportation kept you from medical appointments or from getting medications? Pt Unable   In the past 12 months, has lack of transportation kept you from meetings, work, or from getting things needed for daily living? Pt Unable     Care Transitions: Patient reviewed during care round meeting this AM. He has been accepted at [facility] and will transfer as soon as a bed is available. Met with patient at bedside. Role of TCC explained. Demographics and contacts verified. He lives alone in a one story home. His neighbor is currently caring for his dog. He is unable to verify his PCP. [facility] and also [facility] 5 days a week through [facility] but unsure of agency. He [facility] all [facility] the [facility]. States he independent  with ADL's but does require help at times. The [facility] aid assists with housekeeping / cooking. Denies any falls at home. Neighbor is his mode of transportation. Discharge plan TBD since he is being transferred to another  hospital. Mar Laureano RN/TCC  - [phone] Called and spoke to Orquidea Jesuslindsey @ [phone]  at [facility]. Verified patient does receive home health care services through the [facility]. Agency providing services is [facility]. He received 10 hours a week. He is also [facility] in the [facility]. Since patient is homebound, VA provider Rachna Chadwick NP examines patient in his home. He also received  services in the home with Adri Chávez. Orquidea is aware patient is being transferred to [facility] and has already alerted his [facility] care team. Care team to follow for any other patient needs until transfer. Mar Laureano RN/TCC

## 2024-06-13 NOTE — CARE PLAN
Problem: Pain - Adult  Goal: Verbalizes/displays adequate comfort level or baseline comfort level  Outcome: Progressing     Problem: Safety - Adult  Goal: Free from fall injury  Outcome: Progressing     Problem: Discharge Planning  Goal: Discharge to home or other facility with appropriate resources  Outcome: Progressing     Problem: Chronic Conditions and Co-morbidities  Goal: Patient's chronic conditions and co-morbidity symptoms are monitored and maintained or improved  Outcome: Progressing     Problem: Skin  Goal: Decreased wound size/increased tissue granulation at next dressing change  Outcome: Progressing  Flowsheets (Taken 6/13/2024 0718)  Decreased wound size/increased tissue granulation at next dressing change: Promote sleep for wound healing  Goal: Participates in plan/prevention/treatment measures  Outcome: Progressing  Flowsheets (Taken 6/13/2024 0718)  Participates in plan/prevention/treatment measures: Elevate heels  Goal: Prevent/manage excess moisture  Outcome: Progressing  Flowsheets (Taken 6/13/2024 0718)  Prevent/manage excess moisture: Moisturize dry skin  Goal: Prevent/minimize sheer/friction injuries  Outcome: Progressing  Flowsheets (Taken 6/13/2024 0718)  Prevent/minimize sheer/friction injuries: Use pull sheet  Goal: Promote/optimize nutrition  Outcome: Progressing  Flowsheets (Taken 6/13/2024 0718)  Promote/optimize nutrition: Discuss with provider if NPO > 2 days  Goal: Promote skin healing  Outcome: Progressing  Flowsheets (Taken 6/13/2024 0718)  Promote skin healing: Turn/reposition every 2 hours/use positioning/transfer devices     Problem: Fall/Injury  Goal: Not fall by end of shift  Outcome: Progressing  Goal: Be free from injury by end of the shift  Outcome: Progressing  Goal: Verbalize understanding of personal risk factors for fall in the hospital  Outcome: Progressing  Goal: Verbalize understanding of risk factor reduction measures to prevent injury from fall in the  home  Outcome: Progressing  Goal: Use assistive devices by end of the shift  Outcome: Progressing  Goal: Pace activities to prevent fatigue by end of the shift  Outcome: Progressing     Problem: Diabetes  Goal: Achieve decreasing blood glucose levels by end of shift  Outcome: Progressing  Goal: Increase stability of blood glucose readings by end of shift  Outcome: Progressing  Goal: Decrease in ketones present in urine by end of shift  Outcome: Progressing  Goal: Maintain electrolyte levels within acceptable range throughout shift  Outcome: Progressing  Goal: Maintain glucose levels >70mg/dl to <250mg/dl throughout shift  Outcome: Progressing  Goal: No changes in neurological exam by end of shift  Outcome: Progressing  Goal: Learn about and adhere to nutrition recommendations by end of shift  Outcome: Progressing  Goal: Vital signs within normal range for age by end of shift  Outcome: Progressing  Goal: Increase self care and/or family involovement by end of shift  Outcome: Progressing  Goal: Receive DSME education by end of shift  Outcome: Progressing     Problem: Pain  Goal: Takes deep breaths with improved pain control throughout the shift  Outcome: Progressing  Goal: Turns in bed with improved pain control throughout the shift  Outcome: Progressing  Goal: Walks with improved pain control throughout the shift  Outcome: Progressing  Goal: Performs ADL's with improved pain control throughout shift  Outcome: Progressing  Goal: Participates in PT with improved pain control throughout the shift  Outcome: Progressing  Goal: Free from opioid side effects throughout the shift  Outcome: Progressing  Goal: Free from acute confusion related to pain meds throughout the shift  Outcome: Progressing   The patient's goals for the shift include      The clinical goals for the shift include pt will have no episodes of abd pain throughout shift    Over the shift, the patient did not make progress toward the following goals.  Barriers to progression include . Recommendations to address these barriers include .

## 2024-06-13 NOTE — H&P
[facility]  ACUTE CARE SURGERY - HISTORY AND PHYSICAL    Patient Name: Enrike Crespo  MRN: [id number]  Admit Date:   : 1931  AGE: 89 y.o.   GENDER: male  ==============================================================================  TODAY'S ASSESSMENT AND PLAN OF CARE:  ASSESSMENT:  Enrike Crespo is a 89 y.o. male with PMH sick sinus syndrome status post pacemaker, hypertension, seizure, DM, afib, hyperthyroidism, PE, HF, cardiac arrest with defibrillator, stroke, sebaceous cysts, who presented to OSH ER  with abdominal pain. CT demonstrated suspicious for small bowel obstruction possibly closed-loop with several fluid-filled and mildly dilated loops of small bowel present in the right mid and lower abdomen. Second CT demonstrated small-bowel obstruction secondary to internal hernia. KUB  demonstrated dilated air-filled loops of small bowel with air-fluid levels, consistent with small bowel obstruction.  Patient transferred [facility] for further evaluation.        Admit to ACS   Case added on for    Serial abdominal exams   SSI, half home Lantus   Lopressor 10 mg IV q6h  IV ceftriaxone for UTI  Telemetry   Labs: CBC, RFP, Mg, coagulation screen, T/S, LFT, lactate  NPO with D5 half NS 20KCL  NGT to LIWS  Restarted home medications         Subjective   ==============================================================================  CHIEF COMPLAINT/REASON FOR CONSULT:  Chief Complaint: SBO 2/2 to internal hernia     HPI:  Enrike Crespo is a 89 y.o. male with PMH sick sinus syndrome status post pacemaker, hypertension, seizure, DM, hyperthyroidism, PE, HF, cardiac arrest with defibrillator, stroke, sebaceous cysts,  who presented to OSH ER  with abdominal pain.  CT demonstrated suspicious for small bowel obstruction possibly closed-loop with several fluid-filled and mildly dilated loops of small bowel present in the right mid and lower abdomen. Second CT  "demonstrated small-bowel obstruction secondary to internal hernia. KUB  demonstrated dilated air-filled loops of small bowel with air-fluid levels, consistent with small bowel obstruction.  Patient transferred [facility] for further evaluation.      NGT was placed at the OSH ED. Patient stated pain started earlier in the day  while sitting in his lower abdomen radiating to his back.  OSH labs hb 13.5, wbc 9.3, cr 1.05, lactate 1.3. UA with 500 Yvonne/µL and micro urine with >50WBC and RBC >20.   On examination, patient abdominal exam patient said to have improved from OSH but currently /10. Tender to deep palpation. Patient has not had a bowel movement, or has not passed gas. Last BM . NGT in place to LIWS. Endorses occasional lightheadedness. Denies CP, SOB, fevers, or chills.     Patient has cardiac history with EF 20-25% would potentially benefit from cardiac anaesthesia prior to potential OR.    Patient mentioned that he wants to be full code, but \"does not want to be in vegetative state.\"       Objective   PAST MEDICAL HISTORY:   PMH:   Past Medical History:   Diagnosis Date    Bleeding hemorrhoid     Cardiac arrest (Multi)     Diabetes (Multi)     Heart failure (Multi)    HLD  HTN   Hyperthyroidism   PE  Stroke  EF - 20-25%        PSH:   Past Surgical History:   Procedure Laterality Date    CARDIAC DEFIBRILLATOR PLACEMENT        CARDIAC CATHETERIZATION        CARDIAC DEFIBRILLATOR PLACEMENT   2016     Dr. Mishra ICD Medtronic Visia AF MRI VR [id number]    CATARACT EXTRACTION, BILATERAL   2013    CORONARY STENT PLACEMENT        TOTAL KNEE ARTHROPLASTY Right     US ECHO COMP   2018           FH: Mother: HTN, heart attack, thyroid disease  Family History   Problem Relation Name Age of Onset    Heart disease Mother       SOCIAL HISTORY:    Smokin year smoking history   Social History     Tobacco Use   Smoking Status Every Day    Current packs/day: 1.00    Average packs/day: 1 pack/day for " 75.0 years (75.0 ttl pk-yrs)    Types: Cigarettes   Smokeless Tobacco Never       Alcohol: denies  Social History     Substance and Sexual Activity   Alcohol Use Not Currently       Drug use: denies    MEDICATIONS:   Prior to Admission medications    Medication Sig Start Date End Date Taking? Authorizing Provider   apixaban (Eliquis) 5 mg tablet Take 1 tablet (5 mg) by mouth twice a day. 12/30/19   Historical Provider, MD   clopidogrel (Plavix) 75 mg tablet Take 1 tablet (75 mg) by mouth once daily. 10/10/23   Historical Provider, MD   ferrous sulfate, 325 mg ferrous sulfate, tablet Take 1 tablet by mouth once daily with breakfast. 12/13/22   Historical Provider, MD   finasteride (Proscar) 5 mg tablet Take 1 tablet (5 mg) by mouth once daily. 12/5/23   Historical Provider, MD   glipiZIDE XL (Glucotrol XL) 2.5 mg 24 hr tablet Take 1 tablet (2.5 mg) by mouth once daily.    Historical Provider, MD   insulin glargine (Lantus) 100 unit/mL injection Inject 15 Units under the skin once daily in the morning. 2/8/19   Historical Provider, MD   insulin glargine-yfgn 100 unit/mL (3 mL) Pen Inject 14 Units under the skin once daily in the morning. 7/18/23   Historical Provider, MD   metoprolol succinate XL (Toprol-XL) 25 mg 24 hr tablet Take 0.5 tablets (12.5 mg) by mouth once daily. 10/2/23 1/29/24  Historical Provider, MD   pravastatin (Pravachol) 20 mg tablet Take 1 tablet (20 mg) by mouth once daily. 12/27/23   Historical Provider, MD   propylthiouracil (PTU) 50 mg tablet Take 1 tablet (50 mg) by mouth 3 times a day. 12/27/23   Historical Provider, MD   sacubitriL-valsartan (Entresto) 24-26 mg tablet Take 1 tablet by mouth 2 times a day. 6/29/23 1/29/24  Historical Provider, MD   triamcinolone (Kenalog) 0.1 % cream Apply topically 2 times a day. Apply to affected area 1-2 times daily as needed. 1/29/24   Rosario Dumas MD     ALLERGIES:   Allergies   Allergen Reactions    Penicillins Hives       REVIEW OF SYSTEMS:  A  12-point ROS was performed and was unremarkable except as above.    Vitals:    06/14/24 0004   BP: 119/72   Pulse: 88   Resp: 17   Temp: 36.6 °C (97.9 °F)   SpO2: 92%        PHYSICAL EXAM:  GEN: No acute distress. Requires hearing aid.   HEENT: Sclera anicteric. Moist mucous membranes. NGT LIWS  RESP: Breathing non-labored, equal chest rise. On 2L NC.   CV: Regular rate, normotensive Defibrillator in left chest.  GI: Abdomen soft, mildly distended, mildly tender to palpation   :  External catheter  MSK: No gross deformities. RLE knee scar from TKR, ambulates with walker   NEURO: Alert and oriented x3. No focal deficits.  PSYCH: Appropriate mood and affect.  SKIN: No rashes or lesions.    IMAGING SUMMARY:   XR abdomen 1 view    Result Date: 6/13/2024  Interpreted By:  Joseph De La Cruz, STUDY: XR ABDOMEN 1 VIEW; 6/13/2024 11:25 am   INDICATION: Signs/Symptoms:NG OG placement.   COMPARISON: None.   ACCESSION NUMBER(S): [id number]   ORDERING CLINICIAN: SHAY BERNARD   FINDINGS: A single AP radiograph of the abdomen was obtained. An enteric tube is present, with the tip coiled over the gastric fundus. Dilated air-filled loops of small bowel are seen within the abdomen, consistent with obstruction. Free intraperitoneal air and air-fluid levels cannot be excluded without upright or decubitus images.       Container tube placement, as above.   MACRO: None   Signed by: Joseph De La Cruz 6/13/2024 11:30 AM Dictation workstation:   [id number]    XR abdomen 2 views w chest 1 view    Result Date: 6/13/2024  Interpreted By:  Joseph De La Cruz, STUDY: XR ABDOMEN 2 VIEWS WITH CHEST 1 VIEW; ;  6/13/2024 8:17 am   INDICATION: Signs/Symptoms:f/u SBO.   COMPARISON: None.   ACCESSION NUMBER(S): [id number]   ORDERING CLINICIAN: ISSAC MESSINA   TECHNIQUE: Supine and upright AP views of the abdomen and a single PA view of the chest were obtained.   FINDINGS: There is no focal infiltrate, pleural effusion or pneumothorax identified. The  cardiac silhouette is within normal limits for size. Dilated air-filled loops of small bowel with air-fluid levels are seen throughout the abdomen, similar to that seen on CT dated 06/12/2024. No free intraperitoneal air is identified. No abnormal calcifications are seen over the abdomen.       1.  Dilated air-filled loops of small bowel with air-fluid levels, consistent with small bowel obstruction. 2.   MACRO: None   Signed by: Joseph De La Cruz 6/13/2024 10:55 AM Dictation workstation:   [id number]    CT abdomen pelvis w IV and oral contrast    Result Date: 6/12/2024  Interpreted By:  Priscilla Aguilar, STUDY: CT ABDOMEN PELVIS W IV AND ORAL CONTRAST;  6/12/2024 7:53 pm   INDICATION: Signs/Symptoms:surgeon request - SBO on no contrast CT.   COMPARISON: Same day CT of the abdomen and pelvis.   ACCESSION NUMBER(S): [id number]   ORDERING CLINICIAN: JEFFREY INFANTE   TECHNIQUE: Contiguous axial images of the abdomen and pelvis were obtained after the intravenous administration of 68 mL Omnipaque 350 contrast. Coronal and sagittal reformatted images were reconstructed from the axial data. Additionally, 30 mL of Gastrografin was administered orally.   FINDINGS: LOWER CHEST: Atelectatic changes are present in the lower lobes without evidence of new consolidation or pleural effusion.   Heart is normal in size without pericardial effusion.   New enteric tube is present within the distal esophagus.     ABDOMEN/PELVIS:   ABDOMINAL WALL: Cutaneous tissues of the abdomen and pelvis do not demonstrate any acute abnormality.   LIVER: A 1.2 cm focus of hyperdense attenuation in segment 4B of the liver is nonspecific, and may represent transient perfusion abnormality.   Otherwise no acute hepatic abnormality is present. Portal vein is unremarkable in appearance.   BILE DUCTS: No new intrahepatic or extrahepatic biliary dilatation is present.   GALLBLADDER: Layering hyperdense material is present in the gallbladder without  evidence of gallbladder wall thickening or pericholecystic stranding.   PANCREAS: No pancreatic ductal dilatation or peripancreatic stranding is evident.   SPLEEN: Spleen is unremarkable in appearance.   ADRENALS: Bilateral adrenal glands are unremarkable in appearance.   KIDNEYS, URETERS, BLADDER: There is redemonstration of atrophy of the right kidney, although the kidneys demonstrate symmetric enhancement bilaterally. Radiopaque stones are again present in the right kidney, largest measuring up to 7 mm in size without evidence of ureterolithiasis or hydronephrosis.   No radiopaque stone is present in the left kidney.   Slight bladder wall thickening is present.   REPRODUCTIVE ORGANS: Prostate is mildly enlarged.   VESSELS: Extensive vascular calcifications are present in the abdominal aorta and iliac arteries without evidence of acute vascular abnormality.   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   RETROPERITONEUM/LYMPH NODES: No enlarged lymph nodes. No acute retroperitoneal abnormality.   BOWEL/MESENTERY/PERITONEUM: In the interim since prior same day exam performed at 4 p.m., there has been interval increase in size and number of dilated loops of small bowel, which are predominantly located in the right abdomen. A suspected transitional zone is present in the right mid abdomen, possibly due to internal hernia (series 2, image 59).   There is slight wall thickening of several loops of small bowel near the inferior right hepatic lobe (series 5, image 52). Appendix is unremarkable in appearance in the right lower quadrant.   Scattered diverticula are present throughout the large bowel without evidence of acute diverticulitis. No inflammatory large bowel wall thickening is present.   Several pockets of free fluid are present in the abdomen and pelvis, including along the right hepatic lobe, small amount near the spleen, in the right lower quadrant near  the dilated loops of bowel and in the lower pelvis. There is no evidence of free air or rim enhancing collections.     MUSCULOSKELETAL: No acute osseous abnormality.       1. Constellation of imaging findings consistent with small-bowel obstruction secondary to internal hernia (series 2, image 59), with interval increase in size and numbers of dilated loops of small bowel in the abdomen compared to prior same day exam performed at 4 p.m.. 2. Mild wall thickening is present in the loop of small bowel along the inferior right hepatic lobe with associated fluid. No pneumatosis is evident at this time. 3. Additional findings as detailed above.   MACRO: None.   Signed by: Priscilla Aguilar 6/12/2024 8:33 PM Dictation workstation:   [id number]    XR chest abdomen for OG NG placement    Result Date: 6/12/2024  Interpreted By:  Luis Brink, STUDY: XR CHEST ABDOMEN FOR OG NG PLACEMENT   INDICATION: Signs/Symptoms:ng placement.   COMPARISON: None   ACCESSION NUMBER(S): [id number]   ORDERING CLINICIAN: JEFFREY INFANTE   FINDINGS: Nasogastric tube tip over the gastric cardia with the side port in the distal esophagus.   Multifocal patchy airspace opacities throughout both lungs incompletely assessed.       Nasogastric tube tip over the gastric cardia with the side port in the distal esophagus.   Signed by: Luis Brink 6/12/2024 6:18 PM Dictation workstation:   [id number]    CT abdomen pelvis wo IV contrast    Result Date: 6/12/2024  Interpreted By:  Priscilla Aguilar, STUDY: CT ABDOMEN PELVIS WO IV CONTRAST;  6/12/2024 4:11 pm   INDICATION: Signs/Symptoms:flank pain hx of stone.   COMPARISON: None   ACCESSION NUMBER(S): [id number]   ORDERING CLINICIAN: JEFFREY INFANTE   TECHNIQUE: CT of the abdomen and pelvis was performed. Contiguous axial images were obtained at 3 mm slice thickness through the abdomen and pelvis. Coronal and sagittal reconstructions at 3 mm slice thickness were performed.  No intravenous or  oral contrast agents were administered.   FINDINGS: Please note that the evaluation of vessels, lymph nodes and organs is limited without intravenous contrast.   LOWER CHEST: Mucous plugging is present in the right lower lobe, with atelectatic changes in the lower lobes bilaterally. No consolidation or sizable pleural effusion is evident.   Heart is upper limits of normal in size without pericardial effusion. Marked coronary artery calcifications are present.   Distal esophagus is not demonstrate any acute abnormality.   ABDOMEN:   LIVER: Within limits of noncontrast exam liver does not demonstrate any acute hepatic abnormality.   BILE DUCTS: No intrahepatic or extrahepatic biliary dilatation is present.   GALLBLADDER: Layering hyperdense material is present in the gallbladder likely representing small stones or sludge. No wall thickening or pericholecystic stranding is present.   PANCREAS: No pancreatic ductal dilatation or peripancreatic stranding is evident.   SPLEEN: Spleen does not demonstrate any acute abnormalities.   ADRENAL GLANDS: Mild nodular thickening is present in the left adrenal gland.   KIDNEYS AND URETERS: The right kidney is asymmetrically atrophic in appearance compared to the contralateral left side. No hydronephrosis is present bilaterally, although several radiopaque stones are noted in the right kidney, with the largest cluster measuring up to 8 mm in the midpole.   PELVIS:   BLADDER: Mild bladder wall thickening is present.   REPRODUCTIVE ORGANS: Prostate is enlarged.   BOWEL: Stomach is distended with fluid and air without evidence of wall thickening. Proximal duodenal is nondistended, although several loops of mildly dilated fluid-filled small bowel are present in the right mid and lower abdomen with slight associated mesenteric stranding and some free fluid. There is suggestion of mild wall thickening as well (series 5, image 52). A transitional point possibly due to adhesions or bowel  twisting upon itself appears to be present in the right upper abdomen along the inferior margin of the liver (series 2, image 59; series 5, image 36).   There is no evidence of bowel pneumatosis. Appendix is unremarkable in the right lower quadrant.   Small solid stool burden is present throughout the large bowel. Scattered diverticula are noted without evidence of acute diverticulitis. No inflammatory large bowel wall thickening is present.   VESSELS: Extensive vascular calcifications are present in the abdominal aorta and iliac arteries bilaterally.   PERITONEUM/RETROPERITONEUM/LYMPH NODES: Some free fluid is present in the right mid abdomen. Small amount of free fluid is present along the right hepatic lobe and spleen. No thick-walled collections or free air is identified. No enlarged mesenteric lymph nodes.   ABDOMINAL WALL: Cutaneous tissues of the abdomen and pelvis do not demonstrate any acute abnormality.   BONES: Multilevel degenerative changes are present in the lower thoracic and lumbar spine without evidence of compression fractures with likely at least mild-to-moderate narrowing present at L2-L3 and L4-L5 due to combination of disc osteophyte complexes and ligamentum flavum thickening.       1.  Constellation of imaging findings suspicious for a small-bowel obstruction, possibly closed loop, with several fluid-filled and mildly dilated loops of small bowel present in the right mid and lower abdomen, with mild associated wall thickening and some free fluid, and is suspected transitional zone present near the right hepatic lobe. There is no evidence of pneumatosis or perforation. Surgical consultation is recommended. 2. The right kidney is atrophic in appearance compared to the left with numerous radiopaque stones, largest measuring up to 8 mm in size. No hydronephrosis is present or ureterolithiasis. 3. Circumferential wall thickening of the bladder, correlate with urinalysis to exclude any underlying  cystitis. 4. Numerous diverticula are present in the descending and sigmoid colon without evidence of acute diverticulitis. 5. Small amount of nonspecific fluid is present along the right hepatic lobe and spleen. 6. Layering hyperdense material is present in the gallbladder likely representing stones or sludge without evidence of wall thickening or pericholecystic stranding. 7. Some mucous plugging is present in the smaller airways of the right lower lobe. 8. Prostatomegaly, and additional findings as detailed above.     MACRO: Priscilla Aguilar discussed the significance and urgency of this critical finding by epic secure chat with  JEFFREY INFANTE on 6/12/2024 at 4:33 pm.  (**-RCF-**) Findings:  See findings.   Signed by: Priscilla Aguilar 6/12/2024 4:33 PM Dictation workstation:   [id number]    I have reviewed the imaging above as it pertains to the patient's surgical concerns and agree with the radiologist's interpretation.  LABS:  Results for orders placed or performed during the hospital encounter of 06/12/24 (from the past 24 hour(s))   CBC   Result Value Ref Range    WBC 9.3 4.4 - 11.3 x10*3/uL    nRBC 0.0 0.0 - 0.0 /100 WBCs    RBC 4.37 (L) 4.50 - 5.90 x10*6/uL    Hemoglobin 13.5 13.5 - 17.5 g/dL    Hematocrit 42.9 41.0 - 52.0 %    MCV 98 80 - 100 fL    MCH 30.9 26.0 - 34.0 pg    MCHC 31.5 (L) 32.0 - 36.0 g/dL    RDW 13.0 11.5 - 14.5 %    Platelets 273 150 - 450 x10*3/uL   Basic metabolic panel   Result Value Ref Range    Glucose 129 (H) 74 - 99 mg/dL    Sodium 142 136 - 145 mmol/L    Potassium 3.9 3.5 - 5.3 mmol/L    Chloride 104 98 - 107 mmol/L    Bicarbonate 27 21 - 32 mmol/L    Anion Gap 15 10 - 20 mmol/L    Urea Nitrogen 22 6 - 23 mg/dL    Creatinine 1.05 0.50 - 1.30 mg/dL    eGFR 66 >60 mL/min/1.73m*2    Calcium 8.9 8.6 - 10.3 mg/dL   Magnesium   Result Value Ref Range    Magnesium 1.93 1.60 - 2.40 mg/dL   POCT GLUCOSE   Result Value Ref Range    POCT Glucose 163 (H) 74 - 99 mg/dL   Urinalysis  with Reflex Culture and Microscopic   Result Value Ref Range    Color, Urine Yellow Light-Yellow, Yellow, Dark-Yellow    Appearance, Urine Clear Clear    Specific Gravity, Urine >1.030 (N) 1.005 - 1.035    pH, Urine 6.0 5.0, 5.5, 6.0, 6.5, 7.0, 7.5, 8.0    Protein, Urine 50 (1+) (A) NEGATIVE, 10 (TRACE), 20 (TRACE) mg/dL    Glucose, Urine Normal Normal mg/dL    Blood, Urine 0.1 (1+) (A) NEGATIVE    Ketones, Urine 10 (1+) (A) NEGATIVE mg/dL    Bilirubin, Urine NEGATIVE NEGATIVE    Urobilinogen, Urine Normal Normal mg/dL    Nitrite, Urine NEGATIVE NEGATIVE    Leukocyte Esterase, Urine 500 Yvonne/µL (A) NEGATIVE   Microscopic Only, Urine   Result Value Ref Range    WBC, Urine >50 (A) 1-5, NONE /HPF    RBC, Urine >20 (A) NONE, 1-2, 3-5 /HPF    Bacteria, Urine 1+ (A) NONE SEEN /HPF    Mucus, Urine 4+ Reference range not established. /LPF   POCT GLUCOSE   Result Value Ref Range    POCT Glucose 120 (H) 74 - 99 mg/dL   POCT GLUCOSE   Result Value Ref Range    POCT Glucose 78 74 - 99 mg/dL     I/O past 24h:  I/O last 3 completed shifts:  In: 1140 [P.O.:90; I.V.:1000; IV Piggyback:50]  Out: 2900 [Urine:250; Emesis/NG output:2650]    I have reviewed all laboratory and imaging results ordered/pertinent for this encounter.     Patient's exam, labs, and findings seen and discussed chief Dr. Lopez and attending  Dr. John , who agrees with the plan as described above.    Marin Argueta MD  PGY-1 General Surgery  Acute Care Surgery [phone]

## 2024-06-13 NOTE — SIGNIFICANT EVENT
Awaiting transfer to [facility].  Patient continues to be somnolent in bed but arousable.  I offered to get him up to the chair and he said it takes 2 people to get him out of bed.  There is a walker in his room but physical therapy was not consulted.  Since he is still here we will have physical therapy see him and I encouraged him to do active range of motion of his extremities while he is in bed.  He does have SCDs on.  He had been on oral anticoagulants but he will probably need to get started on Lovenox tomorrow.  He continues to deny pain.  He denies any flatus.  NG tube was repositioned and is functional.

## 2024-06-13 NOTE — PROGRESS NOTES
"Enrike Crespo is a 89 y.o. male on day 1 of admission presenting with Small bowel obstruction (Multi).    Subjective   Patient sleeping when I entered the room.  He arouses slowly but eventually wakes up.  He is extremely hard of hearing.  He has his right hearing aid in but I still up to speak very loudly.  He continues to deny any abdominal pain.  He states sometimes it hurts in the right hypogastric region but it is not currently.  He has not had any flatus.       Objective     Physical Exam  Patient arouses and is oriented.  Respirations are unlabored.  Regular rate  NG tube in place with bilious drainage.  Abdomen is soft nontender without peritoneal signs.    Last Recorded Vitals  Blood pressure 128/73, pulse 100, temperature 37.3 °C (99.1 °F), temperature source Oral, resp. rate 22, height 1.778 m (5' 10\"), weight 73.5 kg (162 lb), SpO2 90%.  Intake/Output last 3 Shifts:  I/O last 3 completed shifts:  In: 999.6 (13.6 mL/kg) [I.V.:999.6 (13.6 mL/kg)]  Out: 1450 (19.7 mL/kg) [Emesis/NG output:1450]  Weight: 73.5 kg       Current Facility-Administered Medications:     dextrose 50 % injection 12.5 g, 12.5 g, intravenous, q15 min PRN, Abdi Prince MD    dextrose 50 % injection 25 g, 25 g, intravenous, q15 min PRN, Abdi Prince MD    ferrous sulfate (325 mg ferrous sulfate) tablet 1 tablet, 1 tablet, oral, Daily with breakfast, Shayla Law MD, 1 tablet at 06/13/24 0913    finasteride (Proscar) tablet 5 mg, 5 mg, oral, Daily, Shayla Law MD, 5 mg at 06/13/24 0913    [Held by provider] glipiZIDE XL (Glucotrol XL) 24 hr tablet 2.5 mg, 2.5 mg, oral, Daily with breakfast, Shayla Law MD    glucagon (Glucagen) injection 1 mg, 1 mg, intramuscular, q15 min PRN, Abdi Prince MD    glucagon (Glucagen) injection 1 mg, 1 mg, intramuscular, q15 min PRN, Abdi Prince MD    insulin glargine (Lantus) injection 15 Units, 15 Units, subcutaneous, q AM, Shayla Law MD, " 15 Units at 06/13/24 0913    insulin lispro (HumaLOG) injection 0-5 Units, 0-5 Units, subcutaneous, TID, Abdi Prince MD, 1 Units at 06/13/24 0914    metoprolol succinate XL (Toprol-XL) 24 hr tablet 12.5 mg, 12.5 mg, oral, Daily, Abdi Prince MD, 12.5 mg at 06/13/24 0912    metoprolol tartrate (Lopressor) injection 5 mg, 5 mg, intravenous, q6h PRN, Abdi Prince MD    morphine injection 2 mg, 2 mg, intravenous, q6h PRN, Abdi Prince MD, 2 mg at 06/12/24 2256    ondansetron (Zofran) injection 4 mg, 4 mg, intravenous, q6h PRN, Abdi Prince MD    polyethylene glycol (Glycolax, Miralax) packet 17 g, 17 g, oral, Daily, Shayla Law MD, 17 g at 06/13/24 0914    pravastatin (Pravachol) tablet 20 mg, 20 mg, oral, Daily, Shayla Law MD, 20 mg at 06/13/24 0913    propylthiouracil (PTU) tablet 50 mg, 50 mg, oral, TID, Shayla Law MD    sacubitriL-valsartan (Entresto) 24-26 mg per tablet 1 tablet, 1 tablet, oral, BID, Abdi Prince MD, 1 tablet at 06/13/24 0913    sodium chloride 0.9% infusion, 75 mL/hr, intravenous, Continuous, Abdi Prince MD, Last Rate: 75 mL/hr at 06/13/24 0632, 75 mL/hr at 06/13/24 0632     Results for orders placed or performed during the hospital encounter of 06/12/24 (from the past 24 hour(s))   CBC and Auto Differential   Result Value Ref Range    WBC 11.9 (H) 4.4 - 11.3 x10*3/uL    nRBC 0.0 0.0 - 0.0 /100 WBCs    RBC 3.98 (L) 4.50 - 5.90 x10*6/uL    Hemoglobin 12.4 (L) 13.5 - 17.5 g/dL    Hematocrit 39.4 (L) 41.0 - 52.0 %    MCV 99 80 - 100 fL    MCH 31.2 26.0 - 34.0 pg    MCHC 31.5 (L) 32.0 - 36.0 g/dL    RDW 13.0 11.5 - 14.5 %    Platelets 231 150 - 450 x10*3/uL    Neutrophils % 84.3 40.0 - 80.0 %    Immature Granulocytes %, Automated 0.3 0.0 - 0.9 %    Lymphocytes % 9.3 13.0 - 44.0 %    Monocytes % 5.8 2.0 - 10.0 %    Eosinophils % 0.2 0.0 - 6.0 %    Basophils % 0.1 0.0 - 2.0 %    Neutrophils Absolute 10.06 (H) 1.60 - 5.50 x10*3/uL     Immature Granulocytes Absolute, Automated 0.03 0.00 - 0.50 x10*3/uL    Lymphocytes Absolute 1.11 0.80 - 3.00 x10*3/uL    Monocytes Absolute 0.69 0.05 - 0.80 x10*3/uL    Eosinophils Absolute 0.02 0.00 - 0.40 x10*3/uL    Basophils Absolute 0.01 0.00 - 0.10 x10*3/uL   Comprehensive metabolic panel   Result Value Ref Range    Glucose 125 (H) 74 - 99 mg/dL    Sodium 139 136 - 145 mmol/L    Potassium 4.2 3.5 - 5.3 mmol/L    Chloride 105 98 - 107 mmol/L    Bicarbonate 25 21 - 32 mmol/L    Anion Gap 13 10 - 20 mmol/L    Urea Nitrogen 20 6 - 23 mg/dL    Creatinine 1.12 0.50 - 1.30 mg/dL    eGFR 61 >60 mL/min/1.73m*2    Calcium 9.4 8.6 - 10.3 mg/dL    Albumin 4.1 3.4 - 5.0 g/dL    Alkaline Phosphatase 72 33 - 136 U/L    Total Protein 6.7 6.4 - 8.2 g/dL    AST 12 9 - 39 U/L    Bilirubin, Total 0.3 0.0 - 1.2 mg/dL    ALT 7 (L) 10 - 52 U/L   Lipase   Result Value Ref Range    Lipase 20 9 - 82 U/L   Lactate   Result Value Ref Range    Lactate 1.3 0.4 - 2.0 mmol/L   CBC   Result Value Ref Range    WBC 9.3 4.4 - 11.3 x10*3/uL    nRBC 0.0 0.0 - 0.0 /100 WBCs    RBC 4.37 (L) 4.50 - 5.90 x10*6/uL    Hemoglobin 13.5 13.5 - 17.5 g/dL    Hematocrit 42.9 41.0 - 52.0 %    MCV 98 80 - 100 fL    MCH 30.9 26.0 - 34.0 pg    MCHC 31.5 (L) 32.0 - 36.0 g/dL    RDW 13.0 11.5 - 14.5 %    Platelets 273 150 - 450 x10*3/uL   Basic metabolic panel   Result Value Ref Range    Glucose 129 (H) 74 - 99 mg/dL    Sodium 142 136 - 145 mmol/L    Potassium 3.9 3.5 - 5.3 mmol/L    Chloride 104 98 - 107 mmol/L    Bicarbonate 27 21 - 32 mmol/L    Anion Gap 15 10 - 20 mmol/L    Urea Nitrogen 22 6 - 23 mg/dL    Creatinine 1.05 0.50 - 1.30 mg/dL    eGFR 66 >60 mL/min/1.73m*2    Calcium 8.9 8.6 - 10.3 mg/dL   Magnesium   Result Value Ref Range    Magnesium 1.93 1.60 - 2.40 mg/dL   POCT GLUCOSE   Result Value Ref Range    POCT Glucose 163 (H) 74 - 99 mg/dL      Acute abdominal series shows continued small bowel distention.  It seems somewhat improved.  There is no free  air.  The sidehole of the NG is right at the GE junction and I have asked him to advance it 5 to 7 cm.    Assessment/Plan   Principal Problem:    Small bowel obstruction (Multi)    Patient with internal hernia and bowel obstruction.  He has no current signs of ischemia.  He is awaiting transfer to [facility].  We will continue with the NG tube to suction.  If he clinically worsens we will try to move him up on the transfer list or I will simply do his surgery.  Patient is requesting sips of water which we will provide.  I have placed him on KENYA's although he said he has voided 3 times overnight quite well.       Rachna Laurent MD

## 2024-06-13 NOTE — PROGRESS NOTES
Enrike Crespo is a 89 y.o. male on day 1 of admission presenting with Small bowel obstruction (Multi).      Subjective   Patient is very sweet elderly gentleman in no acute distress.  NG tube currently to suction.  He is requesting a popsicle.  He denies any abdominal discomfort.  Awaiting transfer to [facility].       Objective     Last Recorded Vitals  /72 (BP Location: Right arm, Patient Position: Lying)   Pulse 84   Temp 37.3 °C (99.1 °F) (Temporal)   Resp 18   Wt 73.5 kg (162 lb)   SpO2 92%   Intake/Output last 3 Shifts:    Intake/Output Summary (Last 24 hours) at 6/13/2024 1145  Last data filed at 6/13/2024 1112  Gross per 24 hour   Intake 1049.58 ml   Output 1700 ml   Net -650.42 ml       Admission Weight  Weight: 73.5 kg (162 lb) (06/12/24 1449)    Daily Weight  06/12/24 : 73.5 kg (162 lb)    Image Results  XR abdomen 1 view  Narrative: Interpreted By:  Joseph De La Cruz,   STUDY:  XR ABDOMEN 1 VIEW; 6/13/2024 11:25 am      INDICATION:  Signs/Symptoms:NG OG placement.      COMPARISON:  None.      ACCESSION NUMBER(S):  [id number]      ORDERING CLINICIAN:  SHAY BERNARD      FINDINGS:  A single AP radiograph of the abdomen was obtained. An enteric tube  is present, with the tip coiled over the gastric fundus. Dilated  air-filled loops of small bowel are seen within the abdomen,  consistent with obstruction. Free intraperitoneal air and air-fluid  levels cannot be excluded without upright or decubitus images.      Impression: Container tube placement, as above.      MACRO:  None      Signed by: Joseph De La Cruz 6/13/2024 11:30 AM  Dictation workstation:   [id number]  XR abdomen 2 views w chest 1 view  Narrative: Interpreted By:  Joseph De La Cruz,   STUDY:  XR ABDOMEN 2 VIEWS WITH CHEST 1 VIEW; ;  6/13/2024 8:17 am      INDICATION:  Signs/Symptoms:f/u SBO.      COMPARISON:  None.      ACCESSION NUMBER(S):  [id number]      ORDERING CLINICIAN:  ISSAC MESSINA      TECHNIQUE:  Supine and upright AP views of  the abdomen and a single PA view of  the chest were obtained.      FINDINGS:  There is no focal infiltrate, pleural effusion or pneumothorax  identified. The cardiac silhouette is within normal limits for size.  Dilated air-filled loops of small bowel with air-fluid levels are  seen throughout the abdomen, similar to that seen on CT dated  06/12/2024. No free intraperitoneal air is identified. No abnormal  calcifications are seen over the abdomen.      Impression: 1.  Dilated air-filled loops of small bowel with air-fluid levels,  consistent with small bowel obstruction.  2.      MACRO:  None      Signed by: Joseph De La Cruz 6/13/2024 10:55 AM  Dictation workstation:   [id number]      Physical Exam  Physical Exam  Constitutional:       Appearance: Normal appearance.   HENT:      Head: Normocephalic.   Mucous membranes dry  Eyes:      Extraocular Movements: Extraocular movements intact.      Conjunctiva/sclera: Conjunctivae normal.   Cardiovascular:      Rate and Rhythm: Normal rate.      Heart sounds: No murmur heard.  Pulmonary:      Effort: Pulmonary effort is normal.      Comments: Decreased breathing sounds bilaterally  Abdominal:      Palpations: Abdomen is soft. There is no mass.      Comments: Mild lower abdominal tenderness, no rebound or guarding   Musculoskeletal:      Right lower leg: Edema present.      Left lower leg: Edema present.   Neurological:      General: No focal deficit present.      Mental Status: He is alert.   Psychiatric:         Mood and Affect: Mood normal.   Relevant Results       Results for orders placed or performed during the hospital encounter of 06/12/24 (from the past 24 hour(s))   CBC and Auto Differential   Result Value Ref Range    WBC 11.9 (H) 4.4 - 11.3 x10*3/uL    nRBC 0.0 0.0 - 0.0 /100 WBCs    RBC 3.98 (L) 4.50 - 5.90 x10*6/uL    Hemoglobin 12.4 (L) 13.5 - 17.5 g/dL    Hematocrit 39.4 (L) 41.0 - 52.0 %    MCV 99 80 - 100 fL    MCH 31.2 26.0 - 34.0 pg    MCHC 31.5 (L) 32.0 -  36.0 g/dL    RDW 13.0 11.5 - 14.5 %    Platelets 231 150 - 450 x10*3/uL    Neutrophils % 84.3 40.0 - 80.0 %    Immature Granulocytes %, Automated 0.3 0.0 - 0.9 %    Lymphocytes % 9.3 13.0 - 44.0 %    Monocytes % 5.8 2.0 - 10.0 %    Eosinophils % 0.2 0.0 - 6.0 %    Basophils % 0.1 0.0 - 2.0 %    Neutrophils Absolute 10.06 (H) 1.60 - 5.50 x10*3/uL    Immature Granulocytes Absolute, Automated 0.03 0.00 - 0.50 x10*3/uL    Lymphocytes Absolute 1.11 0.80 - 3.00 x10*3/uL    Monocytes Absolute 0.69 0.05 - 0.80 x10*3/uL    Eosinophils Absolute 0.02 0.00 - 0.40 x10*3/uL    Basophils Absolute 0.01 0.00 - 0.10 x10*3/uL   Comprehensive metabolic panel   Result Value Ref Range    Glucose 125 (H) 74 - 99 mg/dL    Sodium 139 136 - 145 mmol/L    Potassium 4.2 3.5 - 5.3 mmol/L    Chloride 105 98 - 107 mmol/L    Bicarbonate 25 21 - 32 mmol/L    Anion Gap 13 10 - 20 mmol/L    Urea Nitrogen 20 6 - 23 mg/dL    Creatinine 1.12 0.50 - 1.30 mg/dL    eGFR 61 >60 mL/min/1.73m*2    Calcium 9.4 8.6 - 10.3 mg/dL    Albumin 4.1 3.4 - 5.0 g/dL    Alkaline Phosphatase 72 33 - 136 U/L    Total Protein 6.7 6.4 - 8.2 g/dL    AST 12 9 - 39 U/L    Bilirubin, Total 0.3 0.0 - 1.2 mg/dL    ALT 7 (L) 10 - 52 U/L   Lipase   Result Value Ref Range    Lipase 20 9 - 82 U/L   Lactate   Result Value Ref Range    Lactate 1.3 0.4 - 2.0 mmol/L   CBC   Result Value Ref Range    WBC 9.3 4.4 - 11.3 x10*3/uL    nRBC 0.0 0.0 - 0.0 /100 WBCs    RBC 4.37 (L) 4.50 - 5.90 x10*6/uL    Hemoglobin 13.5 13.5 - 17.5 g/dL    Hematocrit 42.9 41.0 - 52.0 %    MCV 98 80 - 100 fL    MCH 30.9 26.0 - 34.0 pg    MCHC 31.5 (L) 32.0 - 36.0 g/dL    RDW 13.0 11.5 - 14.5 %    Platelets 273 150 - 450 x10*3/uL   Basic metabolic panel   Result Value Ref Range    Glucose 129 (H) 74 - 99 mg/dL    Sodium 142 136 - 145 mmol/L    Potassium 3.9 3.5 - 5.3 mmol/L    Chloride 104 98 - 107 mmol/L    Bicarbonate 27 21 - 32 mmol/L    Anion Gap 15 10 - 20 mmol/L    Urea Nitrogen 22 6 - 23 mg/dL    Creatinine  1.05 0.50 - 1.30 mg/dL    eGFR 66 >60 mL/min/1.73m*2    Calcium 8.9 8.6 - 10.3 mg/dL   Magnesium   Result Value Ref Range    Magnesium 1.93 1.60 - 2.40 mg/dL   POCT GLUCOSE   Result Value Ref Range    POCT Glucose 163 (H) 74 - 99 mg/dL   Urinalysis with Reflex Culture and Microscopic   Result Value Ref Range    Color, Urine Yellow Light-Yellow, Yellow, Dark-Yellow    Appearance, Urine Clear Clear    Specific Gravity, Urine >1.030 (N) 1.005 - 1.035    pH, Urine 6.0 5.0, 5.5, 6.0, 6.5, 7.0, 7.5, 8.0    Protein, Urine 50 (1+) (A) NEGATIVE, 10 (TRACE), 20 (TRACE) mg/dL    Glucose, Urine Normal Normal mg/dL    Blood, Urine 0.1 (1+) (A) NEGATIVE    Ketones, Urine 10 (1+) (A) NEGATIVE mg/dL    Bilirubin, Urine NEGATIVE NEGATIVE    Urobilinogen, Urine Normal Normal mg/dL    Nitrite, Urine NEGATIVE NEGATIVE    Leukocyte Esterase, Urine 500 Yvonne/µL (A) NEGATIVE   Microscopic Only, Urine   Result Value Ref Range    WBC, Urine >50 (A) 1-5, NONE /HPF    RBC, Urine >20 (A) NONE, 1-2, 3-5 /HPF    Bacteria, Urine 1+ (A) NONE SEEN /HPF    Mucus, Urine 4+ Reference range not established. /LPF   POCT GLUCOSE   Result Value Ref Range    POCT Glucose 120 (H) 74 - 99 mg/dL       Assessment/Plan    Principal Problem:    Small bowel obstruction (Multi)  Internal hernia and small bowel obstruction  -Dr. Giordano consulting and treating  -CT repeated with contrast shows increase in the amount of small bowel distention.  -NG tube suction x-ray today to confirm placement  -BMP magnesium daily  -I&O  -N.p.o. except ice chips and popsicles  -No current signs of ischemia, awaiting transfer to main [place].  -Pain currently controlled patient has as needed medications available    UTI  -Start Rocephin IV  -As needed Benadryl since reported possible hives  -Culture pending    DM2  -Patient on sliding scale insulin and Lantus    History of pacemaker defibrillator/CAD/CHF  -Hold home meds of Plavix, Entresto, pravastatin, Eliquis, Lasix  -No signs of  fluid overload at this time    History of hyperthyroid  -Patient on PTU at home    DVT prophylaxis SCDs patient is awaiting surgical procedure transfer to [facility] [place] [facility].  No bed available.                    Julee Modi, APRN-CNP

## 2024-06-14 ENCOUNTER — APPOINTMENT (OUTPATIENT)
Dept: RADIOLOGY | Facility: HOSPITAL | Age: 89
DRG: 329 | End: 2024-06-14
Payer: MEDICARE

## 2024-06-14 LAB
ABO GROUP (TYPE) IN BLOOD: NORMAL
ALBUMIN SERPL BCP-MCNC: 3.6 G/DL (ref 3.4–5)
ALBUMIN SERPL BCP-MCNC: 3.8 G/DL (ref 3.4–5)
ALBUMIN SERPL BCP-MCNC: 3.8 G/DL (ref 3.4–5)
ALP SERPL-CCNC: 59 U/L (ref 33–136)
ALT SERPL W P-5'-P-CCNC: 9 U/L (ref 10–52)
ANION GAP SERPL CALC-SCNC: 16 MMOL/L (ref 10–20)
ANION GAP SERPL CALC-SCNC: 17 MMOL/L (ref 10–20)
ANION GAP SERPL CALC-SCNC: 19 MMOL/L (ref 10–20)
ANTIBODY SCREEN: NORMAL
APTT PPP: 30 SECONDS (ref 27–38)
AST SERPL W P-5'-P-CCNC: 38 U/L (ref 9–39)
BASOPHILS # BLD MANUAL: 0 X10*3/UL (ref 0–0.1)
BASOPHILS # BLD MANUAL: 0 X10*3/UL (ref 0–0.1)
BASOPHILS NFR BLD MANUAL: 0 %
BASOPHILS NFR BLD MANUAL: 0 %
BILIRUB DIRECT SERPL-MCNC: 0.1 MG/DL (ref 0–0.3)
BILIRUB SERPL-MCNC: 0.5 MG/DL (ref 0–1.2)
BUN SERPL-MCNC: 33 MG/DL (ref 6–23)
BUN SERPL-MCNC: 33 MG/DL (ref 6–23)
BUN SERPL-MCNC: 38 MG/DL (ref 6–23)
BURR CELLS BLD QL SMEAR: ABNORMAL
CALCIUM SERPL-MCNC: 9 MG/DL (ref 8.6–10.6)
CALCIUM SERPL-MCNC: 9.1 MG/DL (ref 8.6–10.6)
CALCIUM SERPL-MCNC: 9.4 MG/DL (ref 8.6–10.6)
CHLORIDE SERPL-SCNC: 101 MMOL/L (ref 98–107)
CHLORIDE SERPL-SCNC: 102 MMOL/L (ref 98–107)
CHLORIDE SERPL-SCNC: 99 MMOL/L (ref 98–107)
CO2 SERPL-SCNC: 30 MMOL/L (ref 21–32)
CO2 SERPL-SCNC: 30 MMOL/L (ref 21–32)
CO2 SERPL-SCNC: 33 MMOL/L (ref 21–32)
CREAT SERPL-MCNC: 1.19 MG/DL (ref 0.5–1.3)
CREAT SERPL-MCNC: 1.22 MG/DL (ref 0.5–1.3)
CREAT SERPL-MCNC: 1.41 MG/DL (ref 0.5–1.3)
EGFRCR SERPLBLD CKD-EPI 2021: 46 ML/MIN/1.73M*2
EGFRCR SERPLBLD CKD-EPI 2021: 55 ML/MIN/1.73M*2
EGFRCR SERPLBLD CKD-EPI 2021: 57 ML/MIN/1.73M*2
EOSINOPHIL # BLD MANUAL: 0 X10*3/UL (ref 0–0.4)
EOSINOPHIL # BLD MANUAL: 0.03 X10*3/UL (ref 0–0.4)
EOSINOPHIL NFR BLD MANUAL: 0 %
EOSINOPHIL NFR BLD MANUAL: 0.9 %
ERYTHROCYTE [DISTWIDTH] IN BLOOD BY AUTOMATED COUNT: 13.3 % (ref 11.5–14.5)
ERYTHROCYTE [DISTWIDTH] IN BLOOD BY AUTOMATED COUNT: 13.7 % (ref 11.5–14.5)
GLUCOSE BLD MANUAL STRIP-MCNC: 100 MG/DL (ref 74–99)
GLUCOSE BLD MANUAL STRIP-MCNC: 124 MG/DL (ref 74–99)
GLUCOSE BLD MANUAL STRIP-MCNC: 145 MG/DL (ref 74–99)
GLUCOSE BLD MANUAL STRIP-MCNC: 169 MG/DL (ref 74–99)
GLUCOSE BLD MANUAL STRIP-MCNC: 192 MG/DL (ref 74–99)
GLUCOSE SERPL-MCNC: 70 MG/DL (ref 74–99)
GLUCOSE SERPL-MCNC: 73 MG/DL (ref 74–99)
GLUCOSE SERPL-MCNC: 93 MG/DL (ref 74–99)
HCT VFR BLD AUTO: 38.9 % (ref 41–52)
HCT VFR BLD AUTO: 39.7 % (ref 41–52)
HGB BLD-MCNC: 12.8 G/DL (ref 13.5–17.5)
HGB BLD-MCNC: 13.1 G/DL (ref 13.5–17.5)
HOLD SPECIMEN: NORMAL
IMM GRANULOCYTES # BLD AUTO: 0 X10*3/UL (ref 0–0.5)
IMM GRANULOCYTES # BLD AUTO: 0.01 X10*3/UL (ref 0–0.5)
IMM GRANULOCYTES NFR BLD AUTO: 0 % (ref 0–0.9)
IMM GRANULOCYTES NFR BLD AUTO: 0.4 % (ref 0–0.9)
INR PPP: 1.6 (ref 0.9–1.1)
LACTATE SERPL-SCNC: 1.3 MMOL/L (ref 0.4–2)
LYMPHOCYTES # BLD MANUAL: 0.28 X10*3/UL (ref 0.8–3)
LYMPHOCYTES # BLD MANUAL: 0.34 X10*3/UL (ref 0.8–3)
LYMPHOCYTES NFR BLD MANUAL: 10.4 %
LYMPHOCYTES NFR BLD MANUAL: 11.2 %
MAGNESIUM SERPL-MCNC: 1.91 MG/DL (ref 1.6–2.4)
MAGNESIUM SERPL-MCNC: 2.02 MG/DL (ref 1.6–2.4)
MCH RBC QN AUTO: 30.7 PG (ref 26–34)
MCH RBC QN AUTO: 31.3 PG (ref 26–34)
MCHC RBC AUTO-ENTMCNC: 32.2 G/DL (ref 32–36)
MCHC RBC AUTO-ENTMCNC: 33.7 G/DL (ref 32–36)
MCV RBC AUTO: 93 FL (ref 80–100)
MCV RBC AUTO: 95 FL (ref 80–100)
METAMYELOCYTES # BLD MANUAL: 0.13 X10*3/UL
METAMYELOCYTES # BLD MANUAL: 0.26 X10*3/UL
METAMYELOCYTES NFR BLD MANUAL: 4.8 %
METAMYELOCYTES NFR BLD MANUAL: 8.6 %
MONOCYTES # BLD MANUAL: 0.41 X10*3/UL (ref 0.05–0.8)
MONOCYTES # BLD MANUAL: 0.41 X10*3/UL (ref 0.05–0.8)
MONOCYTES NFR BLD MANUAL: 13.8 %
MONOCYTES NFR BLD MANUAL: 15.2 %
MYELOCYTES # BLD MANUAL: 0.05 X10*3/UL
MYELOCYTES NFR BLD MANUAL: 1.7 %
NEUTROPHILS # BLD MANUAL: 1.86 X10*3/UL (ref 1.6–5.5)
NEUTROPHILS # BLD MANUAL: 1.92 X10*3/UL (ref 1.6–5.5)
NEUTS BAND # BLD MANUAL: 0.37 X10*3/UL (ref 0–0.5)
NEUTS BAND # BLD MANUAL: 0.39 X10*3/UL (ref 0–0.5)
NEUTS BAND NFR BLD MANUAL: 12.9 %
NEUTS BAND NFR BLD MANUAL: 13.6 %
NEUTS SEG # BLD MANUAL: 1.49 X10*3/UL (ref 1.6–5)
NEUTS SEG # BLD MANUAL: 1.53 X10*3/UL (ref 1.6–5)
NEUTS SEG NFR BLD MANUAL: 50.9 %
NEUTS SEG NFR BLD MANUAL: 55.2 %
NRBC BLD-RTO: 0 /100 WBCS (ref 0–0)
NRBC BLD-RTO: 0 /100 WBCS (ref 0–0)
OVALOCYTES BLD QL SMEAR: ABNORMAL
PHOSPHATE SERPL-MCNC: 4.4 MG/DL (ref 2.5–4.9)
PHOSPHATE SERPL-MCNC: 4.7 MG/DL (ref 2.5–4.9)
PHOSPHATE SERPL-MCNC: 4.7 MG/DL (ref 2.5–4.9)
PLATELET # BLD AUTO: 234 X10*3/UL (ref 150–450)
PLATELET # BLD AUTO: 264 X10*3/UL (ref 150–450)
POLYCHROMASIA BLD QL SMEAR: ABNORMAL
POTASSIUM SERPL-SCNC: 3.7 MMOL/L (ref 3.5–5.3)
POTASSIUM SERPL-SCNC: 3.7 MMOL/L (ref 3.5–5.3)
POTASSIUM SERPL-SCNC: 6.4 MMOL/L (ref 3.5–5.3)
PROT SERPL-MCNC: 6.7 G/DL (ref 6.4–8.2)
PROTHROMBIN TIME: 18.4 SECONDS (ref 9.8–12.8)
RBC # BLD AUTO: 4.17 X10*6/UL (ref 4.5–5.9)
RBC # BLD AUTO: 4.19 X10*6/UL (ref 4.5–5.9)
RBC MORPH BLD: ABNORMAL
RBC MORPH BLD: ABNORMAL
RH FACTOR (ANTIGEN D): NORMAL
SODIUM SERPL-SCNC: 142 MMOL/L (ref 136–145)
SODIUM SERPL-SCNC: 144 MMOL/L (ref 136–145)
SODIUM SERPL-SCNC: 147 MMOL/L (ref 136–145)
TOTAL CELLS COUNTED BLD: 116
TOTAL CELLS COUNTED BLD: 125
VARIANT LYMPHS # BLD MANUAL: 0.02 X10*3/UL (ref 0–0.3)
VARIANT LYMPHS NFR BLD: 0.8 %
WBC # BLD AUTO: 2.7 X10*3/UL (ref 4.4–11.3)
WBC # BLD AUTO: 3 X10*3/UL (ref 4.4–11.3)

## 2024-06-14 PROCEDURE — 99223 1ST HOSP IP/OBS HIGH 75: CPT | Performed by: SURGERY

## 2024-06-14 PROCEDURE — 85610 PROTHROMBIN TIME: CPT

## 2024-06-14 PROCEDURE — 85027 COMPLETE CBC AUTOMATED: CPT | Mod: 91

## 2024-06-14 PROCEDURE — 2500000004 HC RX 250 GENERAL PHARMACY W/ HCPCS (ALT 636 FOR OP/ED)

## 2024-06-14 PROCEDURE — 82947 ASSAY GLUCOSE BLOOD QUANT: CPT

## 2024-06-14 PROCEDURE — 74018 RADEX ABDOMEN 1 VIEW: CPT

## 2024-06-14 PROCEDURE — 2500000001 HC RX 250 WO HCPCS SELF ADMINISTERED DRUGS (ALT 637 FOR MEDICARE OP)

## 2024-06-14 PROCEDURE — 83605 ASSAY OF LACTIC ACID: CPT

## 2024-06-14 PROCEDURE — 85007 BL SMEAR W/DIFF WBC COUNT: CPT | Mod: 91

## 2024-06-14 PROCEDURE — 83735 ASSAY OF MAGNESIUM: CPT | Mod: 91

## 2024-06-14 PROCEDURE — 74018 RADEX ABDOMEN 1 VIEW: CPT | Performed by: RADIOLOGY

## 2024-06-14 PROCEDURE — 84100 ASSAY OF PHOSPHORUS: CPT

## 2024-06-14 PROCEDURE — 71045 X-RAY EXAM CHEST 1 VIEW: CPT | Performed by: RADIOLOGY

## 2024-06-14 PROCEDURE — 85027 COMPLETE CBC AUTOMATED: CPT

## 2024-06-14 PROCEDURE — 2550000001 HC RX 255 CONTRASTS

## 2024-06-14 PROCEDURE — 99238 HOSP IP/OBS DSCHRG MGMT 30/<: CPT | Performed by: PHYSICIAN ASSISTANT

## 2024-06-14 PROCEDURE — 80069 RENAL FUNCTION PANEL: CPT | Mod: CCI,91

## 2024-06-14 PROCEDURE — 82248 BILIRUBIN DIRECT: CPT

## 2024-06-14 PROCEDURE — 2500000005 HC RX 250 GENERAL PHARMACY W/O HCPCS

## 2024-06-14 PROCEDURE — 83735 ASSAY OF MAGNESIUM: CPT

## 2024-06-14 PROCEDURE — 36415 COLL VENOUS BLD VENIPUNCTURE: CPT

## 2024-06-14 PROCEDURE — 80053 COMPREHEN METABOLIC PANEL: CPT

## 2024-06-14 PROCEDURE — 85007 BL SMEAR W/DIFF WBC COUNT: CPT

## 2024-06-14 PROCEDURE — 80069 RENAL FUNCTION PANEL: CPT | Mod: CCI

## 2024-06-14 PROCEDURE — 86850 RBC ANTIBODY SCREEN: CPT

## 2024-06-14 PROCEDURE — 71045 X-RAY EXAM CHEST 1 VIEW: CPT

## 2024-06-14 PROCEDURE — 1200000002 HC GENERAL ROOM WITH TELEMETRY DAILY

## 2024-06-14 RX ORDER — INSULIN LISPRO 100 [IU]/ML
0-5 INJECTION, SOLUTION INTRAVENOUS; SUBCUTANEOUS
Status: DISCONTINUED | OUTPATIENT
Start: 2024-06-14 | End: 2024-06-19

## 2024-06-14 RX ORDER — SODIUM CHLORIDE, SODIUM LACTATE, POTASSIUM CHLORIDE, CALCIUM CHLORIDE 600; 310; 30; 20 MG/100ML; MG/100ML; MG/100ML; MG/100ML
50 INJECTION, SOLUTION INTRAVENOUS CONTINUOUS
Status: DISCONTINUED | OUTPATIENT
Start: 2024-06-14 | End: 2024-06-14

## 2024-06-14 RX ORDER — NALOXONE HYDROCHLORIDE 0.4 MG/ML
0.2 INJECTION, SOLUTION INTRAMUSCULAR; INTRAVENOUS; SUBCUTANEOUS EVERY 5 MIN PRN
Status: ACTIVE | OUTPATIENT
Start: 2024-06-14

## 2024-06-14 RX ORDER — INSULIN GLARGINE 100 [IU]/ML
15 INJECTION, SOLUTION SUBCUTANEOUS EVERY MORNING
Status: DISCONTINUED | OUTPATIENT
Start: 2024-06-14 | End: 2024-06-14

## 2024-06-14 RX ORDER — HYDROMORPHONE HYDROCHLORIDE 1 MG/ML
0.2 INJECTION, SOLUTION INTRAMUSCULAR; INTRAVENOUS; SUBCUTANEOUS EVERY 4 HOURS PRN
Status: DISCONTINUED | OUTPATIENT
Start: 2024-06-14 | End: 2024-06-17

## 2024-06-14 RX ORDER — OXYCODONE HYDROCHLORIDE 5 MG/1
5 TABLET ORAL EVERY 6 HOURS PRN
Status: DISCONTINUED | OUTPATIENT
Start: 2024-06-14 | End: 2024-06-17

## 2024-06-14 RX ORDER — PROPYLTHIOURACIL 50 MG/1
50 TABLET ORAL 3 TIMES DAILY
Status: DISCONTINUED | OUTPATIENT
Start: 2024-06-14 | End: 2024-06-18

## 2024-06-14 RX ORDER — METOPROLOL TARTRATE 1 MG/ML
5 INJECTION, SOLUTION INTRAVENOUS EVERY 6 HOURS
Status: DISPENSED | OUTPATIENT
Start: 2024-06-14

## 2024-06-14 RX ORDER — ACETAMINOPHEN 160 MG/5ML
650 SOLUTION ORAL EVERY 6 HOURS
Status: DISCONTINUED | OUTPATIENT
Start: 2024-06-14 | End: 2024-06-18

## 2024-06-14 RX ORDER — GLIPIZIDE 2.5 MG/1
2.5 TABLET, EXTENDED RELEASE ORAL
Status: DISCONTINUED | OUTPATIENT
Start: 2024-06-14 | End: 2024-06-18

## 2024-06-14 RX ORDER — DEXTROSE 50 % IN WATER (D50W) INTRAVENOUS SYRINGE
25
Status: ACTIVE | OUTPATIENT
Start: 2024-06-14

## 2024-06-14 RX ORDER — OXYCODONE HYDROCHLORIDE 5 MG/1
10 TABLET ORAL EVERY 6 HOURS PRN
Status: DISCONTINUED | OUTPATIENT
Start: 2024-06-14 | End: 2024-06-17

## 2024-06-14 RX ORDER — ONDANSETRON HYDROCHLORIDE 2 MG/ML
4 INJECTION, SOLUTION INTRAVENOUS EVERY 8 HOURS PRN
Status: ACTIVE | OUTPATIENT
Start: 2024-06-14

## 2024-06-14 RX ORDER — CEFTRIAXONE 1 G/50ML
1 INJECTION, SOLUTION INTRAVENOUS EVERY 24 HOURS
Status: DISCONTINUED | OUTPATIENT
Start: 2024-06-14 | End: 2024-06-20

## 2024-06-14 RX ORDER — INSULIN GLARGINE 100 [IU]/ML
7 INJECTION, SOLUTION SUBCUTANEOUS EVERY MORNING
Status: DISCONTINUED | OUTPATIENT
Start: 2024-06-14 | End: 2024-06-22

## 2024-06-14 RX ORDER — FERROUS SULFATE 325(65) MG
1 TABLET ORAL
Status: DISCONTINUED | OUTPATIENT
Start: 2024-06-14 | End: 2024-06-18

## 2024-06-14 RX ORDER — FINASTERIDE 5 MG/1
5 TABLET, FILM COATED ORAL DAILY
Status: DISPENSED | OUTPATIENT
Start: 2024-06-14

## 2024-06-14 RX ORDER — PRAVASTATIN SODIUM 20 MG/1
20 TABLET ORAL DAILY
Status: DISCONTINUED | OUTPATIENT
Start: 2024-06-14 | End: 2024-06-18

## 2024-06-14 RX ORDER — TRIAMCINOLONE ACETONIDE 1 MG/G
CREAM TOPICAL 2 TIMES DAILY
Status: DISPENSED | OUTPATIENT
Start: 2024-06-14

## 2024-06-14 RX ORDER — DEXTROSE MONOHYDRATE AND SODIUM CHLORIDE 5; .45 G/100ML; G/100ML
50 INJECTION, SOLUTION INTRAVENOUS CONTINUOUS
Status: DISCONTINUED | OUTPATIENT
Start: 2024-06-14 | End: 2024-06-14

## 2024-06-14 RX ORDER — ACETAMINOPHEN 10 MG/ML
1000 INJECTION, SOLUTION INTRAVENOUS EVERY 6 HOURS SCHEDULED
Status: COMPLETED | OUTPATIENT
Start: 2024-06-14 | End: 2024-06-15

## 2024-06-14 RX ORDER — POLYETHYLENE GLYCOL 3350 17 G/17G
17 POWDER, FOR SOLUTION ORAL DAILY PRN
Status: DISCONTINUED | OUTPATIENT
Start: 2024-06-14 | End: 2024-06-18

## 2024-06-14 RX ORDER — ENOXAPARIN SODIUM 100 MG/ML
40 INJECTION SUBCUTANEOUS EVERY 24 HOURS
Status: DISCONTINUED | OUTPATIENT
Start: 2024-06-14 | End: 2024-06-20

## 2024-06-14 RX ORDER — DEXTROSE MONOHYDRATE, SODIUM CHLORIDE, AND POTASSIUM CHLORIDE 50; 1.49; 4.5 G/1000ML; G/1000ML; G/1000ML
75 INJECTION, SOLUTION INTRAVENOUS CONTINUOUS
Status: DISCONTINUED | OUTPATIENT
Start: 2024-06-14 | End: 2024-06-15

## 2024-06-14 RX ORDER — DEXTROSE 50 % IN WATER (D50W) INTRAVENOUS SYRINGE
12.5
Status: ACTIVE | OUTPATIENT
Start: 2024-06-14

## 2024-06-14 SDOH — SOCIAL STABILITY: SOCIAL INSECURITY: ABUSE: ADULT

## 2024-06-14 SDOH — SOCIAL STABILITY: SOCIAL INSECURITY: HAS ANYONE EVER THREATENED TO HURT YOUR FAMILY OR YOUR PETS?: NO

## 2024-06-14 SDOH — SOCIAL STABILITY: SOCIAL INSECURITY: DO YOU FEEL ANYONE HAS EXPLOITED OR TAKEN ADVANTAGE OF YOU FINANCIALLY OR OF YOUR PERSONAL PROPERTY?: NO

## 2024-06-14 SDOH — SOCIAL STABILITY: SOCIAL INSECURITY: HAVE YOU HAD THOUGHTS OF HARMING ANYONE ELSE?: NO

## 2024-06-14 SDOH — SOCIAL STABILITY: SOCIAL INSECURITY: WERE YOU ABLE TO COMPLETE ALL THE BEHAVIORAL HEALTH SCREENINGS?: YES

## 2024-06-14 SDOH — SOCIAL STABILITY: SOCIAL INSECURITY: DOES ANYONE TRY TO KEEP YOU FROM HAVING/CONTACTING OTHER FRIENDS OR DOING THINGS OUTSIDE YOUR HOME?: NO

## 2024-06-14 SDOH — SOCIAL STABILITY: SOCIAL INSECURITY: DO YOU FEEL UNSAFE GOING BACK TO THE PLACE WHERE YOU ARE LIVING?: NO

## 2024-06-14 SDOH — SOCIAL STABILITY: SOCIAL INSECURITY: ARE YOU OR HAVE YOU BEEN THREATENED OR ABUSED PHYSICALLY, EMOTIONALLY, OR SEXUALLY BY ANYONE?: NO

## 2024-06-14 SDOH — SOCIAL STABILITY: SOCIAL INSECURITY: ARE THERE ANY APPARENT SIGNS OF INJURIES/BEHAVIORS THAT COULD BE RELATED TO ABUSE/NEGLECT?: NO

## 2024-06-14 SDOH — SOCIAL STABILITY: SOCIAL INSECURITY: HAVE YOU HAD ANY THOUGHTS OF HARMING ANYONE ELSE?: NO

## 2024-06-14 ASSESSMENT — PATIENT HEALTH QUESTIONNAIRE - PHQ9
2. FEELING DOWN, DEPRESSED OR HOPELESS: NOT AT ALL
1. LITTLE INTEREST OR PLEASURE IN DOING THINGS: NOT AT ALL
SUM OF ALL RESPONSES TO PHQ9 QUESTIONS 1 & 2: 0

## 2024-06-14 ASSESSMENT — LIFESTYLE VARIABLES
AUDIT-C TOTAL SCORE: 0
HOW MANY STANDARD DRINKS CONTAINING ALCOHOL DO YOU HAVE ON A TYPICAL DAY: PATIENT DOES NOT DRINK
AUDIT-C TOTAL SCORE: 0
HOW OFTEN DO YOU HAVE 6 OR MORE DRINKS ON ONE OCCASION: NEVER
PRESCIPTION_ABUSE_PAST_12_MONTHS: NO
SUBSTANCE_ABUSE_PAST_12_MONTHS: NO
SKIP TO QUESTIONS 9-10: 1
HOW OFTEN DO YOU HAVE A DRINK CONTAINING ALCOHOL: NEVER

## 2024-06-14 ASSESSMENT — ACTIVITIES OF DAILY LIVING (ADL)
GROOMING: NEEDS ASSISTANCE
PATIENT'S MEMORY ADEQUATE TO SAFELY COMPLETE DAILY ACTIVITIES?: YES
FEEDING YOURSELF: NEEDS ASSISTANCE
ADEQUATE_TO_COMPLETE_ADL: YES
DRESSING YOURSELF: NEEDS ASSISTANCE
ASSISTIVE_DEVICE: HEARING AID - RIGHT;HEARING AID - LEFT;WALKER
HEARING - LEFT EAR: HEARING AID
JUDGMENT_ADEQUATE_SAFELY_COMPLETE_DAILY_ACTIVITIES: YES
TOILETING: NEEDS ASSISTANCE
BATHING: NEEDS ASSISTANCE
HEARING - RIGHT EAR: HEARING AID
LACK_OF_TRANSPORTATION: PATIENT UNABLE TO ANSWER
LACK_OF_TRANSPORTATION: PATIENT UNABLE TO ANSWER
WALKS IN HOME: NEEDS ASSISTANCE

## 2024-06-14 ASSESSMENT — COGNITIVE AND FUNCTIONAL STATUS - GENERAL
MOBILITY SCORE: 12
DAILY ACTIVITIY SCORE: 17
STANDING UP FROM CHAIR USING ARMS: A LOT
CLIMB 3 TO 5 STEPS WITH RAILING: A LOT
CLIMB 3 TO 5 STEPS WITH RAILING: A LOT
PERSONAL GROOMING: A LITTLE
TOILETING: TOTAL
DRESSING REGULAR LOWER BODY CLOTHING: TOTAL
MOBILITY SCORE: 12
MOVING TO AND FROM BED TO CHAIR: A LOT
HELP NEEDED FOR BATHING: A LITTLE
DRESSING REGULAR UPPER BODY CLOTHING: A LITTLE
DAILY ACTIVITIY SCORE: 12
EATING MEALS: A LITTLE
TURNING FROM BACK TO SIDE WHILE IN FLAT BAD: A LOT
WALKING IN HOSPITAL ROOM: A LOT
MOVING TO AND FROM BED TO CHAIR: A LOT
PATIENT BASELINE BEDBOUND: UNABLE TO ASSESS AT THIS TIME
PERSONAL GROOMING: A LITTLE
TURNING FROM BACK TO SIDE WHILE IN FLAT BAD: A LOT
DRESSING REGULAR UPPER BODY CLOTHING: A LOT
DRESSING REGULAR LOWER BODY CLOTHING: A LOT
EATING MEALS: A LITTLE
STANDING UP FROM CHAIR USING ARMS: A LOT
MOVING FROM LYING ON BACK TO SITTING ON SIDE OF FLAT BED WITH BEDRAILS: A LOT
HELP NEEDED FOR BATHING: A LOT
WALKING IN HOSPITAL ROOM: A LOT
MOVING FROM LYING ON BACK TO SITTING ON SIDE OF FLAT BED WITH BEDRAILS: A LOT
TOILETING: A LITTLE

## 2024-06-14 ASSESSMENT — COLUMBIA-SUICIDE SEVERITY RATING SCALE - C-SSRS
2. HAVE YOU ACTUALLY HAD ANY THOUGHTS OF KILLING YOURSELF?: NO
1. IN THE PAST MONTH, HAVE YOU WISHED YOU WERE DEAD OR WISHED YOU COULD GO TO SLEEP AND NOT WAKE UP?: NO
6. HAVE YOU EVER DONE ANYTHING, STARTED TO DO ANYTHING, OR PREPARED TO DO ANYTHING TO END YOUR LIFE?: NO

## 2024-06-14 ASSESSMENT — PAIN - FUNCTIONAL ASSESSMENT: PAIN_FUNCTIONAL_ASSESSMENT: 0-10

## 2024-06-14 ASSESSMENT — PAIN SCALES - GENERAL
PAINLEVEL_OUTOF10: 0 - NO PAIN
PAINLEVEL_OUTOF10: 3
PAINLEVEL_OUTOF10: 2
PAINLEVEL_OUTOF10: 3
PAINLEVEL_OUTOF10: 2
PAINLEVEL_OUTOF10: 0 - NO PAIN
PAINLEVEL_OUTOF10: 3

## 2024-06-14 ASSESSMENT — PAIN DESCRIPTION - LOCATION
LOCATION: ABDOMEN

## 2024-06-14 NOTE — CARE PLAN
The patient's goals for the shift include Rest and Comfort    The clinical goals for the shift include Patient will be free from falls      Problem: Skin  Goal: Decreased wound size/increased tissue granulation at next dressing change  Outcome: Progressing  Flowsheets (Taken 6/14/2024 0356)  Decreased wound size/increased tissue granulation at next dressing change: Promote sleep for wound healing  Goal: Participates in plan/prevention/treatment measures  Outcome: Progressing  Goal: Prevent/manage excess moisture  Outcome: Progressing  Goal: Prevent/minimize sheer/friction injuries  Outcome: Progressing  Flowsheets (Taken 6/14/2024 0356)  Prevent/minimize sheer/friction injuries:   Use pull sheet   HOB 30 degrees or less  Goal: Promote/optimize nutrition  Outcome: Progressing  Goal: Promote skin healing  Outcome: Progressing  Flowsheets (Taken 6/14/2024 0356)  Promote skin healing: Assess skin/pad under line(s)/device(s)     Problem: Fall/Injury  Goal: Not fall by end of shift  Outcome: Progressing  Goal: Be free from injury by end of the shift  Outcome: Progressing  Goal: Verbalize understanding of personal risk factors for fall in the hospital  Outcome: Progressing  Goal: Verbalize understanding of risk factor reduction measures to prevent injury from fall in the home  Outcome: Progressing  Goal: Use assistive devices by end of the shift  Outcome: Progressing  Goal: Pace activities to prevent fatigue by end of the shift  Outcome: Progressing     Problem: Diabetes  Goal: Achieve decreasing blood glucose levels by end of shift  Outcome: Progressing  Goal: Increase stability of blood glucose readings by end of shift  Outcome: Progressing  Goal: Decrease in ketones present in urine by end of shift  Outcome: Progressing  Goal: Maintain electrolyte levels within acceptable range throughout shift  Outcome: Progressing  Goal: Maintain glucose levels >70mg/dl to <250mg/dl throughout shift  Outcome: Progressing  Goal: No  changes in neurological exam by end of shift  Outcome: Progressing  Goal: Learn about and adhere to nutrition recommendations by end of shift  Outcome: Progressing  Goal: Vital signs within normal range for age by end of shift  Outcome: Progressing  Goal: Increase self care and/or family involovement by end of shift  Outcome: Progressing  Goal: Receive DSME education by end of shift  Outcome: Progressing     Problem: Pain - Adult  Goal: Verbalizes/displays adequate comfort level or baseline comfort level  Outcome: Progressing     Problem: Safety - Adult  Goal: Free from fall injury  Outcome: Progressing     Problem: Discharge Planning  Goal: Discharge to home or other facility with appropriate resources  Outcome: Progressing     Problem: Chronic Conditions and Co-morbidities  Goal: Patient's chronic conditions and co-morbidity symptoms are monitored and maintained or improved  Outcome: Progressing

## 2024-06-14 NOTE — PROGRESS NOTES
"[facility]  ACUTE CARE SURGERY - PROGRESS NOTE    Patient Name: Enrike Crespo  MRN: [id number]  Admit Date: 613  : 1931  AGE: 89 y.o.   GENDER: male  ==============================================================================  TODAY'S ASSESSMENT AND PLAN OF CARE:  Enrike Crespo is a 89 y.o. male with PMH sick sinus syndrome status post pacemaker, hypertension, seizure, DM, afib, hyperthyroidism, PE, HF, cardiac arrest with defibrillator, stroke, sebaceous cysts, who presented for SBO 2/2 possible internal hernia.     Neuro  - pain control with oxycodone    CV  - continue home metoprolol   - continue home statin     GI  - NPO  - continue NGT  - no planned operation at this time   - Gastrografin challenge with bedside KUBs in hopes for a therapeutic as well as diagnostic  results        - continue home finasteride   - Is and Os  - replete as necessary     Heme  - DVT ppx with LVH    Endo   - continue home PTU  - SSI - continue to  monitor BS    Skin  - Triamcinolone cream - home med      ID  - daily CBC  - no abx at this time     Dispo: AROBF     Discussed with Guzman.     Mercedez Smith PA-C  Acute Care Surgery   [id number]    CHIEF COMPLAINT / EVENTS LAST 24HRS / HPI:  Patient is doing well this morning. His NG tube is functioning and patient denies and nausea, vomiting, or abdominal pain at this time. He states that he would like to eat and drink.     MEDICAL HISTORY / ROS:   Admission history and ROS reviewed. Pertinent changes as follows:  NA    PHYSICAL EXAM:  Heart Rate:  []   Temp:  [36.2 °C (97.2 °F)-37.3 °C (99.1 °F)]   Resp:  [16-22]   BP: (109-128)/(63-73)   Height:  [175.3 cm (5' 9\")]   Weight:  [75.2 kg (165 lb 12.6 oz)]   SpO2:  [92 %]   Physical Exam  HENT:      Head: Normocephalic.      Mouth/Throat:      Mouth: Mucous membranes are dry.   Eyes:      Pupils: Pupils are equal, round, and reactive to light.   Cardiovascular:      Rate and Rhythm: Normal " rate.   Pulmonary:      Effort: Pulmonary effort is normal.      Comments: 3 L NC  Abdominal:      General: Abdomen is flat. There is no distension.      Palpations: Abdomen is soft.      Tenderness: There is no abdominal tenderness. There is no guarding.   Musculoskeletal:      Cervical back: Normal range of motion.   Neurological:      Mental Status: He is alert.         IMAGING SUMMARY:  (summary of new imaging findings, not a copy of dictation)  NA    LABS:  Results from last 7 days   Lab Units 06/14/24  0210 06/13/24  0448 06/12/24  1531   WBC AUTO x10*3/uL 2.7* 9.3 11.9*   HEMOGLOBIN g/dL 13.1* 13.5 12.4*   HEMATOCRIT % 38.9* 42.9 39.4*   PLATELETS AUTO x10*3/uL 234 273 231   NEUTROS PCT AUTO %  --   --  84.3   LYMPHO PCT MAN % 10.4  --   --    LYMPHS PCT AUTO %  --   --  9.3   MONO PCT MAN % 15.2  --   --    MONOS PCT AUTO %  --   --  5.8   EOSINO PCT MAN % 0.0  --   --    EOS PCT AUTO %  --   --  0.2     Results from last 7 days   Lab Units 06/14/24  0210   APTT seconds 30   INR  1.6*     Results from last 7 days   Lab Units 06/14/24  0313 06/14/24  0210 06/13/24  0448 06/12/24  1531   SODIUM mmol/L 144 142 142 139   POTASSIUM mmol/L 3.7 6.4* 3.9 4.2   CHLORIDE mmol/L 102 101 104 105   CO2 mmol/L 30 30 27 25   BUN mg/dL 33* 33* 22 20   CREATININE mg/dL 1.22 1.19 1.05 1.12   CALCIUM mg/dL 9.1 9.0 8.9 9.4   PROTEIN TOTAL g/dL  --  6.7  --  6.7   BILIRUBIN TOTAL mg/dL  --  0.5  --  0.3   ALK PHOS U/L  --  59  --  72   ALT U/L  --  9*  --  7*   AST U/L  --  38  --  12   GLUCOSE mg/dL 73* 70* 129* 125*     Results from last 7 days   Lab Units 06/14/24  0210 06/14/24  0034 06/12/24  1531   BILIRUBIN TOTAL mg/dL 0.5  --  0.3   BILIRUBIN DIRECT mg/dL  --  0.1  --            I have reviewed all medications, laboratory results, and imaging pertinent for today's encounter.

## 2024-06-14 NOTE — H&P
"History Of Present Illness  Enrike Crespo is a 89 y.o. male presenting with small bowel obstruction 2/2 internal hernia.     Past Medical History  Past Medical History:   Diagnosis Date    Bleeding hemorrhoid     Cardiac arrest (Multi)     Diabetes (Multi)     Heart failure (Multi)        Surgical History  Past Surgical History:   Procedure Laterality Date    CARDIAC DEFIBRILLATOR PLACEMENT          Social History  He reports that he has been smoking cigarettes. He has a 75 pack-year smoking history. He has never used smokeless tobacco. He reports that he does not currently use alcohol. He reports that he does not use drugs.    Family History  Family History   Problem Relation Name Age of Onset    Heart disease Mother          Allergies  Penicillins    Review of Systems   Negative unless otherwise specified in HPI.     Physical Exam   Constitutional: no acute distress  Neuro: A/O, no gross deficits   Psych: normal affect  HEENT: No deformities, no scleral icterus   Cardiac: RRR  Pulmonary: unlabored respirations   Abdomen: moderately distended tender  Skin: warm and dry overall    Extremities: no swelling noted  MSK: moving all four    Last Recorded Vitals  Blood pressure 122/66, pulse 88, temperature 36.6 °C (97.9 °F), temperature source Temporal, resp. rate 22, height 1.753 m (5' 9\"), weight 75.2 kg (165 lb 12.6 oz), SpO2 92%.    Relevant Results      CT scan reviewed     Assessment/Plan   Principal Problem:    Small bowel obstruction (Multi)      89 y.o. male Hospital Day #0  Procedure(s):  Exploration Laparotomy  Pre-Op Diagnosis Codes:     * Small bowel obstruction (Multi) [K56.609]       Emaneul Sandoval MD    "

## 2024-06-14 NOTE — PROGRESS NOTES
Transitional Care Coordination Progress Note:  PLAN PER MEDICAL TEAM: Awaiting return of bowel function.     PAYOR: [employer]    DISPO: Anticipate home no needs at discharge. ADOD next week.     SUPPORT/CONTACT: DaughterRina, [phone]    Patient is [facility]. : Orquidea Lopez ([phone]). [facility] - company[facility] [employer] [facility]. Per previous TCC note - [facility] team aware of patient's admission to hospital.     Adri Hayes RN, TCC

## 2024-06-14 NOTE — SIGNIFICANT EVENT
Notified by the bedside nurse that the patient had an increasing oxygen requirement up to 5 L from 4 L from 2 L this morning.  On exam, the patient does not have any dyspnea.  Patient was primarily breathing through his mouth with intermittent sonorous respirations when drifting in and out of sleep.     He still has no abdominal tenderness to light and deep palpation in all 4 quadrants.  He has had no return of bowel function, no flatus and no BMs.  He feels subjectively thirsty.  He saturating 94% on 5 L nasal cannula.  We repositioned the sensor on his finger and used an additional sensor in a different part of the body and the readings were concordant.    I examined his NG tube, the blue sump port was covered with a cap and there was gastric and bilious reflux into the tube.  I removed this cap and discussed the malfunctioning NG tube with the bedside nurse.  Additionally the suction adapter for the wall suction was not working properly and would generate either maximal greater than 200 or minimal less than 20 mmHg for the suction pressure.  Reportedly, the cap was placed on the blue sump port because the tube was causing a mess when it would reflux.  We replaced the adapter to the wall suction and I started flushing the tube with air and water and it began functioning properly.  1.2 L of bilious and gastric contents were evacuated during the time of my exam.  The patient reports new sensation of coughing up phlegm which occurred 1 hour prior to arrival.    It is possible that the NG tube clogging led to the patient becoming distended and causing regurgitation leading to aspiration and subsequent increased oxygen requirement.    Recommendations:  Maintain NG tube to low intermittent wall suction  Continue with serial exams and KUBs  Chest x-ray to evaluate for consolidation  Continuous pulse oximetry  If the patient develops an oxygen requirement greater than 6 L/min and not achieving O2 sats greater than 92%,  will recommend transfer to trauma surgical ICU.    Discussed with Dr. Guzman Sandoval MD  [facility] [id number]

## 2024-06-15 LAB
ALBUMIN SERPL BCP-MCNC: 3.4 G/DL (ref 3.4–5)
ALBUMIN SERPL BCP-MCNC: 3.6 G/DL (ref 3.4–5)
ANION GAP SERPL CALC-SCNC: 15 MMOL/L (ref 10–20)
ANION GAP SERPL CALC-SCNC: 19 MMOL/L (ref 10–20)
BACTERIA UR CULT: ABNORMAL
BUN SERPL-MCNC: 52 MG/DL (ref 6–23)
BUN SERPL-MCNC: 58 MG/DL (ref 6–23)
CALCIUM SERPL-MCNC: 8.8 MG/DL (ref 8.6–10.6)
CALCIUM SERPL-MCNC: 9.1 MG/DL (ref 8.6–10.6)
CHLORIDE SERPL-SCNC: 91 MMOL/L (ref 98–107)
CHLORIDE SERPL-SCNC: 95 MMOL/L (ref 98–107)
CHLORIDE UR-SCNC: <15 MMOL/L
CHLORIDE/CREATININE (MMOL/G) IN URINE: NORMAL
CO2 SERPL-SCNC: 42 MMOL/L (ref 21–32)
CO2 SERPL-SCNC: 43 MMOL/L (ref 21–32)
CREAT SERPL-MCNC: 1.97 MG/DL (ref 0.5–1.3)
CREAT SERPL-MCNC: 2 MG/DL (ref 0.5–1.3)
CREAT UR-MCNC: 150.9 MG/DL (ref 20–370)
EGFRCR SERPLBLD CKD-EPI 2021: 31 ML/MIN/1.73M*2
EGFRCR SERPLBLD CKD-EPI 2021: 31 ML/MIN/1.73M*2
ERYTHROCYTE [DISTWIDTH] IN BLOOD BY AUTOMATED COUNT: 13.4 % (ref 11.5–14.5)
GLUCOSE BLD MANUAL STRIP-MCNC: 104 MG/DL (ref 74–99)
GLUCOSE BLD MANUAL STRIP-MCNC: 112 MG/DL (ref 74–99)
GLUCOSE BLD MANUAL STRIP-MCNC: 113 MG/DL (ref 74–99)
GLUCOSE BLD MANUAL STRIP-MCNC: 183 MG/DL (ref 74–99)
GLUCOSE BLD MANUAL STRIP-MCNC: 220 MG/DL (ref 74–99)
GLUCOSE BLD MANUAL STRIP-MCNC: 264 MG/DL (ref 74–99)
GLUCOSE SERPL-MCNC: 228 MG/DL (ref 74–99)
GLUCOSE SERPL-MCNC: 97 MG/DL (ref 74–99)
HCT VFR BLD AUTO: 39.5 % (ref 41–52)
HGB BLD-MCNC: 12.4 G/DL (ref 13.5–17.5)
MAGNESIUM SERPL-MCNC: 2.21 MG/DL (ref 1.6–2.4)
MCH RBC QN AUTO: 30.8 PG (ref 26–34)
MCHC RBC AUTO-ENTMCNC: 31.4 G/DL (ref 32–36)
MCV RBC AUTO: 98 FL (ref 80–100)
NRBC BLD-RTO: 0 /100 WBCS (ref 0–0)
PHOSPHATE SERPL-MCNC: 3 MG/DL (ref 2.5–4.9)
PHOSPHATE SERPL-MCNC: 4.8 MG/DL (ref 2.5–4.9)
PLATELET # BLD AUTO: 232 X10*3/UL (ref 150–450)
POTASSIUM SERPL-SCNC: 3.4 MMOL/L (ref 3.5–5.3)
POTASSIUM SERPL-SCNC: 3.4 MMOL/L (ref 3.5–5.3)
POTASSIUM UR-SCNC: 114 MMOL/L
POTASSIUM/CREAT UR-RTO: 76 MMOL/G CREAT
RBC # BLD AUTO: 4.02 X10*6/UL (ref 4.5–5.9)
SODIUM SERPL-SCNC: 149 MMOL/L (ref 136–145)
SODIUM SERPL-SCNC: 150 MMOL/L (ref 136–145)
SODIUM UR-SCNC: 28 MMOL/L
SODIUM/CREAT UR-RTO: 19 MMOL/G CREAT
WBC # BLD AUTO: 4.9 X10*3/UL (ref 4.4–11.3)

## 2024-06-15 PROCEDURE — 1200000002 HC GENERAL ROOM WITH TELEMETRY DAILY

## 2024-06-15 PROCEDURE — 36415 COLL VENOUS BLD VENIPUNCTURE: CPT

## 2024-06-15 PROCEDURE — 2500000004 HC RX 250 GENERAL PHARMACY W/ HCPCS (ALT 636 FOR OP/ED)

## 2024-06-15 PROCEDURE — 85027 COMPLETE CBC AUTOMATED: CPT

## 2024-06-15 PROCEDURE — 2500000001 HC RX 250 WO HCPCS SELF ADMINISTERED DRUGS (ALT 637 FOR MEDICARE OP)

## 2024-06-15 PROCEDURE — 80069 RENAL FUNCTION PANEL: CPT

## 2024-06-15 PROCEDURE — 83735 ASSAY OF MAGNESIUM: CPT

## 2024-06-15 PROCEDURE — 2500000005 HC RX 250 GENERAL PHARMACY W/O HCPCS

## 2024-06-15 PROCEDURE — 99232 SBSQ HOSP IP/OBS MODERATE 35: CPT | Performed by: SURGERY

## 2024-06-15 PROCEDURE — C9113 INJ PANTOPRAZOLE SODIUM, VIA: HCPCS

## 2024-06-15 PROCEDURE — 82436 ASSAY OF URINE CHLORIDE: CPT

## 2024-06-15 PROCEDURE — 2500000002 HC RX 250 W HCPCS SELF ADMINISTERED DRUGS (ALT 637 FOR MEDICARE OP, ALT 636 FOR OP/ED)

## 2024-06-15 PROCEDURE — 82947 ASSAY GLUCOSE BLOOD QUANT: CPT | Mod: 91

## 2024-06-15 PROCEDURE — 80069 RENAL FUNCTION PANEL: CPT | Mod: 91

## 2024-06-15 RX ORDER — POTASSIUM CHLORIDE 14.9 MG/ML
20 INJECTION INTRAVENOUS
Status: COMPLETED | OUTPATIENT
Start: 2024-06-15 | End: 2024-06-16

## 2024-06-15 RX ORDER — SODIUM CHLORIDE, SODIUM LACTATE, POTASSIUM CHLORIDE, CALCIUM CHLORIDE 600; 310; 30; 20 MG/100ML; MG/100ML; MG/100ML; MG/100ML
100 INJECTION, SOLUTION INTRAVENOUS CONTINUOUS
Status: DISCONTINUED | OUTPATIENT
Start: 2024-06-15 | End: 2024-06-15

## 2024-06-15 RX ORDER — SODIUM CHLORIDE 9 MG/ML
100 INJECTION, SOLUTION INTRAVENOUS CONTINUOUS
Status: DISCONTINUED | OUTPATIENT
Start: 2024-06-15 | End: 2024-06-16

## 2024-06-15 RX ORDER — PANTOPRAZOLE SODIUM 40 MG/10ML
40 INJECTION, POWDER, LYOPHILIZED, FOR SOLUTION INTRAVENOUS 2 TIMES DAILY
Status: DISPENSED | OUTPATIENT
Start: 2024-06-15

## 2024-06-15 RX ORDER — POTASSIUM CHLORIDE 14.9 MG/ML
20 INJECTION INTRAVENOUS
Status: COMPLETED | OUTPATIENT
Start: 2024-06-15 | End: 2024-06-15

## 2024-06-15 RX ORDER — BISACODYL 10 MG/1
10 SUPPOSITORY RECTAL ONCE
Status: COMPLETED | OUTPATIENT
Start: 2024-06-15 | End: 2024-06-15

## 2024-06-15 ASSESSMENT — PAIN SCALES - GENERAL
PAINLEVEL_OUTOF10: 0 - NO PAIN
PAINLEVEL_OUTOF10: 0 - NO PAIN

## 2024-06-15 NOTE — PROGRESS NOTES
[facility]  ACUTE CARE SURGERY - PROGRESS NOTE    Patient Name: Enrike Crespo  MRN: [id number]  Admit Date: 613  : 1931  AGE: 89 y.o.   GENDER: male  ==============================================================================  TODAY'S ASSESSMENT AND PLAN OF CARE:  Enrike Crespo is a 89 y.o. male with PMH sick sinus syndrome status post pacemaker, hypertension, seizure, DM, afib, hyperthyroidism, PE, HF, cardiac arrest with defibrillator, stroke, sebaceous cysts, who presented for SBO 2/2 possible internal hernia.     GGC w/ SBFT yesterday showed contrast retained in the stomach which was subsequently suctioned out from NGT after back on suction for increasing oxygen requirement. Abdominal exam is unchanged. Patient had about 4.8L out from NGT with new SHERLYN. Increased IVF to LR 100mL/hr but due to concern of heart failure and increased oxygen requirement, will hold off any any fluid boluses. Plan for enema today.    Neuro  - pain control with oxycodone    CV  - continue home metoprolol   - continue home statin     GI  - NPO  - continue NGT  - no planned operation at this time   - enema       - continue home finasteride   - Is and Os  - replete as necessary     Heme  - DVT ppx with LVH    Endo   - continue home PTU  - SSI - continue to  monitor BS    Skin  - Triamcinolone cream - home med      ID  - daily CBC  - no abx at this time     Dispo: AROBF     Discussed with Guzman.     Bettie Pederson MD  Acute Care Surgery   P [phone]    CHIEF COMPLAINT / EVENTS LAST 24HRS / HPI:  Increasing oxygen requirement during the day yesterday. NGT flushed and 1.2L of bilious fluid out. Continues to have oxygen requirement of 3-5L. Reports pain is well controlled without nausea or vomiting. No return of bowel function.    MEDICAL HISTORY / ROS:   Admission history and ROS reviewed. Pertinent changes as follows:  NA    PHYSICAL EXAM:  Heart Rate:  [62-90]   Temp:  [35.8 °C (96.4  °F)-37.2 °C (99 °F)]   Resp:  [16-18]   BP: (102-124)/(61-78)   SpO2:  [90 %-96 %]   Physical Exam  HENT:      Head: Normocephalic.      Mouth/Throat:      Mouth: Mucous membranes are dry.   Eyes:      Pupils: Pupils are equal, round, and reactive to light.   Cardiovascular:      Rate and Rhythm: Normal rate.   Pulmonary:      Effort: Pulmonary effort is normal.      Comments: 5 L NC  Abdominal:      General: Abdomen is flat. There is no distension.      Palpations: Abdomen is soft.      Tenderness: There is no abdominal tenderness. There is no guarding.   Musculoskeletal:      Cervical back: Normal range of motion.   Neurological:      Mental Status: He is alert.         IMAGING SUMMARY:  (summary of new imaging findings, not a copy of dictation)  NA    LABS:  Results from last 7 days   Lab Units 06/15/24  0541 06/14/24  1045 06/14/24  0210 06/13/24 0448 06/12/24  1531   WBC AUTO x10*3/uL 4.9 3.0* 2.7*   < > 11.9*   HEMOGLOBIN g/dL 12.4* 12.8* 13.1*   < > 12.4*   HEMATOCRIT % 39.5* 39.7* 38.9*   < > 39.4*   PLATELETS AUTO x10*3/uL 232 264 234   < > 231   NEUTROS PCT AUTO %  --   --   --   --  84.3   LYMPHO PCT MAN %  --  11.2 10.4  --   --    LYMPHS PCT AUTO %  --   --   --   --  9.3   MONO PCT MAN %  --  13.8 15.2  --   --    MONOS PCT AUTO %  --   --   --   --  5.8   EOSINO PCT MAN %  --  0.9 0.0  --   --    EOS PCT AUTO %  --   --   --   --  0.2    < > = values in this interval not displayed.     Results from last 7 days   Lab Units 06/14/24  0210   APTT seconds 30   INR  1.6*     Results from last 7 days   Lab Units 06/15/24  0541 06/14/24  1045 06/14/24  0313 06/14/24  0210 06/13/24 0448 06/12/24  1531   SODIUM mmol/L 150* 147* 144 142   < > 139   POTASSIUM mmol/L 3.4* 3.7 3.7 6.4*   < > 4.2   CHLORIDE mmol/L 91* 99 102 101   < > 105   CO2 mmol/L 43* 33* 30 30   < > 25   BUN mg/dL 52* 38* 33* 33*   < > 20   CREATININE mg/dL 1.97* 1.41* 1.22 1.19   < > 1.12   CALCIUM mg/dL 9.1 9.4 9.1 9.0   < > 9.4   PROTEIN  TOTAL g/dL  --   --   --  6.7  --  6.7   BILIRUBIN TOTAL mg/dL  --   --   --  0.5  --  0.3   ALK PHOS U/L  --   --   --  59  --  72   ALT U/L  --   --   --  9*  --  7*   AST U/L  --   --   --  38  --  12   GLUCOSE mg/dL 228* 93 73* 70*   < > 125*    < > = values in this interval not displayed.     Results from last 7 days   Lab Units 06/14/24  0210 06/14/24  0034 06/12/24  1531   BILIRUBIN TOTAL mg/dL 0.5  --  0.3   BILIRUBIN DIRECT mg/dL  --  0.1  --            I have reviewed all medications, laboratory results, and imaging pertinent for today's encounter.

## 2024-06-15 NOTE — CARE PLAN
The patient's goals for the shift include remaining comfortable     The clinical goals for the shift include patient will remain safe throughout shift

## 2024-06-16 VITALS
WEIGHT: 165.79 LBS | BODY MASS INDEX: 24.56 KG/M2 | OXYGEN SATURATION: 94 % | TEMPERATURE: 98.4 F | HEIGHT: 69 IN | SYSTOLIC BLOOD PRESSURE: 109 MMHG | HEART RATE: 63 BPM | RESPIRATION RATE: 18 BRPM | DIASTOLIC BLOOD PRESSURE: 66 MMHG

## 2024-06-16 LAB
ALBUMIN SERPL BCP-MCNC: 3.3 G/DL (ref 3.4–5)
ANION GAP SERPL CALC-SCNC: 15 MMOL/L (ref 10–20)
BUN SERPL-MCNC: 54 MG/DL (ref 6–23)
CALCIUM SERPL-MCNC: 8.7 MG/DL (ref 8.6–10.6)
CHLORIDE SERPL-SCNC: 100 MMOL/L (ref 98–107)
CO2 SERPL-SCNC: 39 MMOL/L (ref 21–32)
CREAT SERPL-MCNC: 1.71 MG/DL (ref 0.5–1.3)
EGFRCR SERPLBLD CKD-EPI 2021: 37 ML/MIN/1.73M*2
ERYTHROCYTE [DISTWIDTH] IN BLOOD BY AUTOMATED COUNT: 13 % (ref 11.5–14.5)
GLUCOSE BLD MANUAL STRIP-MCNC: 101 MG/DL (ref 74–99)
GLUCOSE SERPL-MCNC: 100 MG/DL (ref 74–99)
HCT VFR BLD AUTO: 39.2 % (ref 41–52)
HGB BLD-MCNC: 12.3 G/DL (ref 13.5–17.5)
MAGNESIUM SERPL-MCNC: 2.14 MG/DL (ref 1.6–2.4)
MCH RBC QN AUTO: 31.2 PG (ref 26–34)
MCHC RBC AUTO-ENTMCNC: 31.4 G/DL (ref 32–36)
MCV RBC AUTO: 100 FL (ref 80–100)
NRBC BLD-RTO: 0 /100 WBCS (ref 0–0)
PHOSPHATE SERPL-MCNC: 2.9 MG/DL (ref 2.5–4.9)
PLATELET # BLD AUTO: 198 X10*3/UL (ref 150–450)
POTASSIUM SERPL-SCNC: 3.6 MMOL/L (ref 3.5–5.3)
RBC # BLD AUTO: 3.94 X10*6/UL (ref 4.5–5.9)
SODIUM SERPL-SCNC: 150 MMOL/L (ref 136–145)
WBC # BLD AUTO: 6.5 X10*3/UL (ref 4.4–11.3)

## 2024-06-16 PROCEDURE — 83735 ASSAY OF MAGNESIUM: CPT

## 2024-06-16 PROCEDURE — 99232 SBSQ HOSP IP/OBS MODERATE 35: CPT | Performed by: SURGERY

## 2024-06-16 PROCEDURE — 1200000002 HC GENERAL ROOM WITH TELEMETRY DAILY

## 2024-06-16 PROCEDURE — 85027 COMPLETE CBC AUTOMATED: CPT

## 2024-06-16 PROCEDURE — 2500000005 HC RX 250 GENERAL PHARMACY W/O HCPCS

## 2024-06-16 PROCEDURE — 2500000004 HC RX 250 GENERAL PHARMACY W/ HCPCS (ALT 636 FOR OP/ED)

## 2024-06-16 PROCEDURE — 82947 ASSAY GLUCOSE BLOOD QUANT: CPT

## 2024-06-16 PROCEDURE — 2500000001 HC RX 250 WO HCPCS SELF ADMINISTERED DRUGS (ALT 637 FOR MEDICARE OP)

## 2024-06-16 PROCEDURE — 80069 RENAL FUNCTION PANEL: CPT

## 2024-06-16 PROCEDURE — 36415 COLL VENOUS BLD VENIPUNCTURE: CPT

## 2024-06-16 PROCEDURE — C9113 INJ PANTOPRAZOLE SODIUM, VIA: HCPCS

## 2024-06-16 RX ORDER — IPRATROPIUM BROMIDE AND ALBUTEROL SULFATE 2.5; .5 MG/3ML; MG/3ML
3 SOLUTION RESPIRATORY (INHALATION)
Status: DISCONTINUED | OUTPATIENT
Start: 2024-06-16 | End: 2024-06-16

## 2024-06-16 RX ORDER — IPRATROPIUM BROMIDE AND ALBUTEROL SULFATE 2.5; .5 MG/3ML; MG/3ML
3 SOLUTION RESPIRATORY (INHALATION) EVERY 6 HOURS PRN
Status: DISCONTINUED | OUTPATIENT
Start: 2024-06-16 | End: 2024-06-22

## 2024-06-16 RX ORDER — DEXTROSE MONOHYDRATE, SODIUM CHLORIDE, AND POTASSIUM CHLORIDE 50; 1.49; 4.5 G/1000ML; G/1000ML; G/1000ML
100 INJECTION, SOLUTION INTRAVENOUS CONTINUOUS
Status: DISCONTINUED | OUTPATIENT
Start: 2024-06-16 | End: 2024-06-18

## 2024-06-16 RX ORDER — POTASSIUM CHLORIDE 14.9 MG/ML
20 INJECTION INTRAVENOUS
Status: COMPLETED | OUTPATIENT
Start: 2024-06-16 | End: 2024-06-16

## 2024-06-16 ASSESSMENT — PAIN SCALES - GENERAL: PAINLEVEL_OUTOF10: 0 - NO PAIN

## 2024-06-16 NOTE — PROGRESS NOTES
[facility]  ACUTE CARE SURGERY - PROGRESS NOTE    Patient Name: Enrike Crespo  MRN: [id number]  Admit Date: 613  : 1931  AGE: 89 y.o.   GENDER: male  ==============================================================================  TODAY'S ASSESSMENT AND PLAN OF CARE:  Enrike Crespo is a 89 y.o. male with PMH sick sinus syndrome status post pacemaker, hypertension, seizure, DM, afib, hyperthyroidism, PE, HF, cardiac arrest with defibrillator, stroke, sebaceous cysts, who presented for SBO 2/2 possible internal hernia.     GGC w/ SBFT  showed contrast retained in the stomach which was subsequently suctioned out from NGT after back on suction for increasing oxygen requirement. Abdominal exam is unchanged. Patient had about 4.8L out from NGT with new SHERLYN. Increased IVF to 100mL/hr but due to concern of heart failure and increased oxygen requirement, will hold off any any fluid boluses. Creatinine is down trending today. Still requiring 3-5L NC for SpO2 >90%. Patient had an enema yesterday with one bowel movement but not having consistent flatus. NGT output decreased to 400 mL of output overnight. Given high output of NGT yesterday and inconsistent flatus today, will re-evaluate for NGT removal tomorrow.    Neuro  - pain control with oxycodone    CV  - continue home metoprolol   - continue home statin    Pulm  - wean NC as tolerated  - RT     GI  - NPO  - continue NGT  - no planned operation at this time        - D5W 1/2NS Kcl 20 @100 mL/hr  - continue home finasteride   - Is and Os  - replete as necessary  - SHERLYN improved today from 2 to 1.71     Heme  - DVT ppx with LVH    Endo   - continue home PTU  - SSI - continue to  monitor BS    Skin  - Triamcinolone cream - home med      ID  - daily CBC  - CTX for UTI     Dispo: RNF     Discussed with Guzman.     Bettie Pederson MD  Acute Care Surgery   P [phone]    CHIEF COMPLAINT / EVENTS LAST 24HRS / HPI:  ROSEANN. Reports pain is  controlled. Endorses one bowel movement but no flatus. Denies nausea or vomiting.    MEDICAL HISTORY / ROS:   Admission history and ROS reviewed. Pertinent changes as follows:  NA    PHYSICAL EXAM:  Heart Rate:  [61-82]   Temp:  [35.8 °C (96.4 °F)-37 °C (98.6 °F)]   Resp:  [17-18]   BP: ()/(56-73)   SpO2:  [93 %-95 %]   Physical Exam  HENT:      Head: Normocephalic.      Mouth/Throat:      Mouth: Mucous membranes are dry.   Eyes:      Pupils: Pupils are equal, round, and reactive to light.   Cardiovascular:      Rate and Rhythm: Normal rate.   Pulmonary:      Effort: Pulmonary effort is normal.      Comments: 5 L NC  Abdominal:      General: Abdomen is flat. There is no distension.      Palpations: Abdomen is soft.      Tenderness: There is no abdominal tenderness. There is no guarding.   Musculoskeletal:      Cervical back: Normal range of motion.   Neurological:      Mental Status: He is alert.         IMAGING SUMMARY:  (summary of new imaging findings, not a copy of dictation)  NA    LABS:  Results from last 7 days   Lab Units 06/16/24  0525 06/15/24  0541 06/14/24  1045 06/14/24  0210 06/13/24  0448 06/12/24  1531   WBC AUTO x10*3/uL 6.5 4.9 3.0* 2.7*   < > 11.9*   HEMOGLOBIN g/dL 12.3* 12.4* 12.8* 13.1*   < > 12.4*   HEMATOCRIT % 39.2* 39.5* 39.7* 38.9*   < > 39.4*   PLATELETS AUTO x10*3/uL 198 232 264 234   < > 231   NEUTROS PCT AUTO %  --   --   --   --   --  84.3   LYMPHO PCT MAN %  --   --  11.2 10.4  --   --    LYMPHS PCT AUTO %  --   --   --   --   --  9.3   MONO PCT MAN %  --   --  13.8 15.2  --   --    MONOS PCT AUTO %  --   --   --   --   --  5.8   EOSINO PCT MAN %  --   --  0.9 0.0  --   --    EOS PCT AUTO %  --   --   --   --   --  0.2    < > = values in this interval not displayed.     Results from last 7 days   Lab Units 06/14/24  0210   APTT seconds 30   INR  1.6*     Results from last 7 days   Lab Units 06/16/24  0526 06/15/24  1921 06/15/24  0541 06/14/24  0313 06/14/24  0210  06/13/24  0448 06/12/24  1531   SODIUM mmol/L 150* 149* 150*   < > 142   < > 139   POTASSIUM mmol/L 3.6 3.4* 3.4*   < > 6.4*   < > 4.2   CHLORIDE mmol/L 100 95* 91*   < > 101   < > 105   CO2 mmol/L 39* 42* 43*   < > 30   < > 25   BUN mg/dL 54* 58* 52*   < > 33*   < > 20   CREATININE mg/dL 1.71* 2.00* 1.97*   < > 1.19   < > 1.12   CALCIUM mg/dL 8.7 8.8 9.1   < > 9.0   < > 9.4   PROTEIN TOTAL g/dL  --   --   --   --  6.7  --  6.7   BILIRUBIN TOTAL mg/dL  --   --   --   --  0.5  --  0.3   ALK PHOS U/L  --   --   --   --  59  --  72   ALT U/L  --   --   --   --  9*  --  7*   AST U/L  --   --   --   --  38  --  12   GLUCOSE mg/dL 100* 97 228*   < > 70*   < > 125*    < > = values in this interval not displayed.     Results from last 7 days   Lab Units 06/14/24  0210 06/14/24  0034 06/12/24  1531   BILIRUBIN TOTAL mg/dL 0.5  --  0.3   BILIRUBIN DIRECT mg/dL  --  0.1  --            I have reviewed all medications, laboratory results, and imaging pertinent for today's encounter.

## 2024-06-16 NOTE — CARE PLAN
Problem: Skin  Goal: Decreased wound size/increased tissue granulation at next dressing change  Outcome: Progressing  Flowsheets (Taken 6/16/2024 0541)  Decreased wound size/increased tissue granulation at next dressing change: Protective dressings over bony prominences  Goal: Participates in plan/prevention/treatment measures  Outcome: Progressing  Flowsheets (Taken 6/16/2024 0541)  Participates in plan/prevention/treatment measures: Elevate heels  Goal: Prevent/manage excess moisture  Outcome: Progressing  Flowsheets (Taken 6/16/2024 0541)  Prevent/manage excess moisture: Cleanse incontinence/protect with barrier cream  Goal: Prevent/minimize sheer/friction injuries  Outcome: Progressing  Flowsheets (Taken 6/16/2024 0541)  Prevent/minimize sheer/friction injuries: Increase activity/out of bed for meals  Goal: Promote/optimize nutrition  Outcome: Progressing  Flowsheets (Taken 6/16/2024 0541)  Promote/optimize nutrition: Discuss with provider if NPO > 2 days  Goal: Promote skin healing  Outcome: Progressing  Flowsheets (Taken 6/16/2024 0541)  Promote skin healing: Protective dressings over bony prominences   The patient's goals for the shift include Labs WNL    The clinical goals for the shift include PT WILL REMAIN HDS DURING SHIFT  Over the shift, the patient did make progress toward the following goals.

## 2024-06-17 ENCOUNTER — APPOINTMENT (OUTPATIENT)
Dept: RADIOLOGY | Facility: HOSPITAL | Age: 89
DRG: 329 | End: 2024-06-17
Payer: MEDICARE

## 2024-06-17 ENCOUNTER — ANESTHESIA EVENT (OUTPATIENT)
Dept: OPERATING ROOM | Facility: HOSPITAL | Age: 89
End: 2024-06-17
Payer: MEDICARE

## 2024-06-17 LAB
ALBUMIN SERPL BCP-MCNC: 3.2 G/DL (ref 3.4–5)
ANION GAP SERPL CALC-SCNC: 13 MMOL/L (ref 10–20)
BUN SERPL-MCNC: 36 MG/DL (ref 6–23)
CALCIUM SERPL-MCNC: 8.6 MG/DL (ref 8.6–10.6)
CHLORIDE SERPL-SCNC: 104 MMOL/L (ref 98–107)
CO2 SERPL-SCNC: 36 MMOL/L (ref 21–32)
CREAT SERPL-MCNC: 1.2 MG/DL (ref 0.5–1.3)
EGFRCR SERPLBLD CKD-EPI 2021: 56 ML/MIN/1.73M*2
ERYTHROCYTE [DISTWIDTH] IN BLOOD BY AUTOMATED COUNT: 12.9 % (ref 11.5–14.5)
GLUCOSE BLD MANUAL STRIP-MCNC: 164 MG/DL (ref 74–99)
GLUCOSE BLD MANUAL STRIP-MCNC: 167 MG/DL (ref 74–99)
GLUCOSE BLD MANUAL STRIP-MCNC: 184 MG/DL (ref 74–99)
GLUCOSE BLD MANUAL STRIP-MCNC: 188 MG/DL (ref 74–99)
GLUCOSE BLD MANUAL STRIP-MCNC: 195 MG/DL (ref 74–99)
GLUCOSE BLD MANUAL STRIP-MCNC: 231 MG/DL (ref 74–99)
GLUCOSE BLD MANUAL STRIP-MCNC: 246 MG/DL (ref 74–99)
GLUCOSE SERPL-MCNC: 207 MG/DL (ref 74–99)
HCT VFR BLD AUTO: 39.5 % (ref 41–52)
HGB BLD-MCNC: 11.9 G/DL (ref 13.5–17.5)
MAGNESIUM SERPL-MCNC: 2.09 MG/DL (ref 1.6–2.4)
MCH RBC QN AUTO: 30.7 PG (ref 26–34)
MCHC RBC AUTO-ENTMCNC: 30.1 G/DL (ref 32–36)
MCV RBC AUTO: 102 FL (ref 80–100)
NRBC BLD-RTO: 0 /100 WBCS (ref 0–0)
PHOSPHATE SERPL-MCNC: 2.2 MG/DL (ref 2.5–4.9)
PLATELET # BLD AUTO: 191 X10*3/UL (ref 150–450)
POTASSIUM SERPL-SCNC: 3.7 MMOL/L (ref 3.5–5.3)
RBC # BLD AUTO: 3.88 X10*6/UL (ref 4.5–5.9)
SODIUM SERPL-SCNC: 149 MMOL/L (ref 136–145)
WBC # BLD AUTO: 8 X10*3/UL (ref 4.4–11.3)

## 2024-06-17 PROCEDURE — 80069 RENAL FUNCTION PANEL: CPT

## 2024-06-17 PROCEDURE — 2500000004 HC RX 250 GENERAL PHARMACY W/ HCPCS (ALT 636 FOR OP/ED)

## 2024-06-17 PROCEDURE — 74176 CT ABD & PELVIS W/O CONTRAST: CPT | Performed by: RADIOLOGY

## 2024-06-17 PROCEDURE — 1200000002 HC GENERAL ROOM WITH TELEMETRY DAILY

## 2024-06-17 PROCEDURE — 74018 RADEX ABDOMEN 1 VIEW: CPT

## 2024-06-17 PROCEDURE — 2500000005 HC RX 250 GENERAL PHARMACY W/O HCPCS

## 2024-06-17 PROCEDURE — 99232 SBSQ HOSP IP/OBS MODERATE 35: CPT | Performed by: SURGERY

## 2024-06-17 PROCEDURE — 83735 ASSAY OF MAGNESIUM: CPT

## 2024-06-17 PROCEDURE — 2500000001 HC RX 250 WO HCPCS SELF ADMINISTERED DRUGS (ALT 637 FOR MEDICARE OP)

## 2024-06-17 PROCEDURE — 85027 COMPLETE CBC AUTOMATED: CPT

## 2024-06-17 PROCEDURE — 2550000001 HC RX 255 CONTRASTS

## 2024-06-17 PROCEDURE — 82947 ASSAY GLUCOSE BLOOD QUANT: CPT | Mod: 91

## 2024-06-17 PROCEDURE — 74176 CT ABD & PELVIS W/O CONTRAST: CPT

## 2024-06-17 PROCEDURE — 74018 RADEX ABDOMEN 1 VIEW: CPT | Performed by: RADIOLOGY

## 2024-06-17 PROCEDURE — 97161 PT EVAL LOW COMPLEX 20 MIN: CPT | Mod: GP

## 2024-06-17 PROCEDURE — 36415 COLL VENOUS BLD VENIPUNCTURE: CPT

## 2024-06-17 PROCEDURE — 2500000002 HC RX 250 W HCPCS SELF ADMINISTERED DRUGS (ALT 637 FOR MEDICARE OP, ALT 636 FOR OP/ED)

## 2024-06-17 PROCEDURE — 97530 THERAPEUTIC ACTIVITIES: CPT | Mod: GP

## 2024-06-17 PROCEDURE — C9113 INJ PANTOPRAZOLE SODIUM, VIA: HCPCS

## 2024-06-17 RX ORDER — HYDROMORPHONE HYDROCHLORIDE 1 MG/ML
0.4 INJECTION, SOLUTION INTRAMUSCULAR; INTRAVENOUS; SUBCUTANEOUS
Status: ACTIVE | OUTPATIENT
Start: 2024-06-17

## 2024-06-17 RX ORDER — POTASSIUM CHLORIDE 14.9 MG/ML
20 INJECTION INTRAVENOUS ONCE
Status: COMPLETED | OUTPATIENT
Start: 2024-06-17 | End: 2024-06-17

## 2024-06-17 RX ORDER — HYDROMORPHONE HYDROCHLORIDE 1 MG/ML
0.2 INJECTION, SOLUTION INTRAMUSCULAR; INTRAVENOUS; SUBCUTANEOUS
Status: DISPENSED | OUTPATIENT
Start: 2024-06-17

## 2024-06-17 ASSESSMENT — COGNITIVE AND FUNCTIONAL STATUS - GENERAL
DAILY ACTIVITIY SCORE: 12
EATING MEALS: A LOT
HELP NEEDED FOR BATHING: A LOT
DRESSING REGULAR UPPER BODY CLOTHING: A LOT
PERSONAL GROOMING: A LOT
TURNING FROM BACK TO SIDE WHILE IN FLAT BAD: A LOT
TURNING FROM BACK TO SIDE WHILE IN FLAT BAD: A LOT
CLIMB 3 TO 5 STEPS WITH RAILING: TOTAL
MOVING FROM LYING ON BACK TO SITTING ON SIDE OF FLAT BED WITH BEDRAILS: A LOT
CLIMB 3 TO 5 STEPS WITH RAILING: TOTAL
MOBILITY SCORE: 11
MOVING TO AND FROM BED TO CHAIR: A LOT
MOVING FROM LYING ON BACK TO SITTING ON SIDE OF FLAT BED WITH BEDRAILS: A LOT
WALKING IN HOSPITAL ROOM: A LOT
TOILETING: A LOT
MOVING TO AND FROM BED TO CHAIR: A LOT
WALKING IN HOSPITAL ROOM: A LOT
DRESSING REGULAR LOWER BODY CLOTHING: A LOT
STANDING UP FROM CHAIR USING ARMS: A LOT
MOBILITY SCORE: 11
STANDING UP FROM CHAIR USING ARMS: A LOT

## 2024-06-17 ASSESSMENT — ACTIVITIES OF DAILY LIVING (ADL): ADL_ASSISTANCE: INDEPENDENT

## 2024-06-17 ASSESSMENT — PAIN SCALES - GENERAL
PAINLEVEL_OUTOF10: 0 - NO PAIN

## 2024-06-17 ASSESSMENT — PAIN - FUNCTIONAL ASSESSMENT
PAIN_FUNCTIONAL_ASSESSMENT: 0-10
PAIN_FUNCTIONAL_ASSESSMENT: 0-10

## 2024-06-17 NOTE — CARE PLAN
The patient's goals for the shift include Pt will sit in chair throughout day     The clinical goals for the shift include Pt will remain safe during shift

## 2024-06-17 NOTE — CARE PLAN
Problem: Skin  Goal: Decreased wound size/increased tissue granulation at next dressing change  Outcome: Progressing  Flowsheets (Taken 6/17/2024 0540)  Decreased wound size/increased tissue granulation at next dressing change: Protective dressings over bony prominences  Goal: Participates in plan/prevention/treatment measures  Outcome: Progressing  Flowsheets (Taken 6/17/2024 0540)  Participates in plan/prevention/treatment measures: Elevate heels  Goal: Prevent/manage excess moisture  Outcome: Progressing  Flowsheets (Taken 6/17/2024 0540)  Prevent/manage excess moisture: Monitor for/manage infection if present  Goal: Prevent/minimize sheer/friction injuries  Outcome: Progressing  Flowsheets (Taken 6/17/2024 0540)  Prevent/minimize sheer/friction injuries: Increase activity/out of bed for meals  Goal: Promote/optimize nutrition  Outcome: Progressing  Flowsheets (Taken 6/17/2024 0540)  Promote/optimize nutrition: Consume > 50% meals/supplements  Goal: Promote skin healing  Outcome: Progressing  Flowsheets (Taken 6/17/2024 0540)  Promote skin healing: Assess skin/pad under line(s)/device(s)   The patient's goals for the shift include Labs WNL    The clinical goals for the shift include Pt will remain safe during shift    Over the shift, the patient did not make progress toward the following goals.

## 2024-06-17 NOTE — PROGRESS NOTES
Physical Therapy    Physical Therapy Evaluation & Treatment    Patient Name: Enrike Crespo  MRN: [id number]  Today's Date: 6/17/2024   Time Calculation  Start Time: 1412  Stop Time: 1444  Time Calculation (min): 32 min    Assessment/Plan   PT Assessment  PT Assessment Results: Decreased strength, Decreased endurance, Impaired balance, Decreased mobility, Decreased safety awareness, Impaired hearing  Rehab Prognosis: Good  Barriers to Discharge: none  Evaluation/Treatment Tolerance: Patient limited by fatigue  Medical Staff Made Aware: Yes  Strengths: Ability to acquire knowledge, Attitude of self, Housing layout, Premorbid level of function  Barriers to Participation: Comorbidities  End of Session Communication: Bedside nurse    Assessment Comment: Pt. is a 89 yom that presents with impairments including decreased functional strength, decreased activity tolerance/endurance, impaired balance, and increased difficulty with functional mobility compared to baseline level of function. Pt. will benefit from skilled PT intervention while inpatient to address the above deficits and maximize return to PLOF.     End of Session Patient Position: Up in chair, Alarm off, not on at start of session (Call light in reach)     IP OR SWING BED PT PLAN  Inpatient or Swing Bed: Inpatient  PT Plan  Treatment/Interventions: Bed mobility, Transfer training, Gait training, Stair training, Balance training, Strengthening, Endurance training, Range of motion, Therapeutic exercise, Therapeutic activity, Home exercise program, Postural re-education  PT Plan: Ongoing PT  PT Frequency: 3 times per week  PT Discharge Recommendations: Moderate intensity level of continued care  Equipment Recommended upon Discharge: Wheeled walker  PT Recommended Transfer Status: Assist x1, Assistive device  PT - OK to Discharge: Yes (eval complete, refer to dispo)      Subjective     General Visit Information:  General  Reason for Referral: Pt presented to the ER  with abdominal pain, admitted for SBO 2/2 possible internal hernia  Past Medical History Relevant to Rehab: sick sinus syndrome status post pacemaker, hypertension, seizure, DM, afib, hyperthyroidism, PE, HF, cardiac arrest with defibrillator, stroke, sebaceous cysts  Family/Caregiver Present: No  Prior to Session Communication: Bedside nurse  Patient Position Received: Up in chair, Alarm off, not on at start of session  Preferred Learning Style: auditory, verbal  General Comment: Pt. received seated in bedside chair, expressed frustraion re: unable to eat and waiting for tests. Pt. was agreeable to participate in session. (NGT, 3L O2 NC, IV, external catheter)    Home Living:  Home Living  Type of Home: House  Lives With:  ( girlfriend's son (works))  Home Adaptive Equipment:  (rollator)  Home Layout: Able to live on main level with bedroom/bathroom  Home Access: Stairs to enter with rails  Entrance Stairs-Rails: Both  Entrance Stairs-Number of Steps: 3  Bathroom Shower/Tub: Walk-in shower  Bathroom Equipment: Grab bars in shower, Built-in shower seat    Prior Level of Function:  Prior Function Per Pt/Caregiver Report  Level of [place]: Independent with ADLs and functional transfers, Needs assistance with homemaking  Receives Help From: Home health (HHA 2 hours/day, 5 days/week)  ADL Assistance: Independent (reports having HHA near by when bathing for safety)  Homemaking Assistance: Needs assistance (HHA completes cooking, household tasks, laundry)  Ambulatory Assistance: Independent (Ulisses with rollator, denied falls)  Vocational: Retired  Leisure: watching TV  Prior Function Comments: -drives, neighbor provides transportation    Precautions:  Precautions  Hearing/Visual Limitations: [place]  Medical Precautions: Fall precautions, Oxygen therapy device and L/min    Vital Signs:  Vital Signs  Heart Rate: 108  Heart Rate Source: Monitor  SpO2: 95 %  Patient Position: Standing    Objective   Pain:  Pain  Assessment  Pain Assessment: 0-10  Pain Score: 0 - No pain    Cognition:  Cognition  Overall Cognitive Status: Within Functional Limits  Orientation Level: Oriented X4    General Assessments:  Activity Tolerance  Endurance: Decreased tolerance for upright activites  Early Mobility/Exercise Safety Screen: Proceed with mobilization - No exclusion criteria met    Sensation  Light Touch: No apparent deficits  Sensation Comment: Pt. denied N/T    Postural Control  Postural Control: Within Functional Limits    Static Sitting Balance  Static Sitting-Comment/Number of Minutes: CGA  Dynamic Sitting Balance  Dynamic Sitting-Comments: CGA    Static Standing Balance  Static Standing-Comment/Number of Minutes: modAx1 with FWW  Dynamic Standing Balance  Dynamic Standing-Comments: modAx1 with FWW    Functional Assessments:  Bed Mobility  Bed Mobility: No    Transfers  Transfer: Yes  Transfer 1  Transfer From 1: Sit to, Stand to  Transfer to 1: Stand, Sit  Technique 1: Sit to stand, Stand to sit  Transfer Device 1: Walker  Transfer Level of Assistance 1: Maximum assistance, Maximum verbal cues  Trials/Comments 1: Cues for proper UE placement and technique. Pt. slow to rise, demos increased retro lean.    Ambulation/Gait Training  Ambulation/Gait Training Performed: Yes  Ambulation/Gait Training 1  Surface 1: Level tile  Device 1: Rolling walker  Assistance 1: Moderate assistance, Moderate verbal cues  Quality of Gait 1: Narrow base of support, Decreased step length, Forward flexed posture (decreased chelsey, increased retro lean)  Comments/Distance (ft) 1: 2 steps forward/retro; seated rest break (cues for AD management and sequencing)      Stairs  Stairs: No    Extremity/Trunk Assessments:  RLE   RLE : Within Functional Limits  LLE   LLE : Within Functional Limits    Treatments:  Therapeutic Activity  Therapeutic Activity Performed: Yes  Therapeutic Activity 1: Seated rest break between transfer attempts. Emphasis on upright  posture, deep breathing, skillful monitoring of vitals.    Ambulation/Gait Training  Ambulation/Gait Training Performed: Yes  Ambulation/Gait Training 2  Surface 2: Level tile  Device 2: Rolling walker  Assistance 2: Moderate assistance, Moderate verbal cues  Quality of Gait 2: Narrow base of support, Decreased step length, Forward flexed posture (decreased chelsey, increased retro lean)  Comments/Distance (ft) 2: 4 steps forward/retro    Transfers  Transfer: Yes  Transfers 2  Transfer From 2: Sit to, Stand to  Transfer to 2: Stand, Sit  Technique 2: Sit to stand, Stand to sit  Transfer Device 2: Walker  Transfer Level of Assistance 2: Moderate assistance, Moderate verbal cues  Trials/Comments 2: Cues for anterior trunk lean to utilize momentum, demos improved ease compared to previous transfer    Stairs  Stairs: No    Outcome Measures:  [facility] Basic Mobility  Turning from your back to your side while in a flat bed without using bedrails: A lot  Moving from lying on your back to sitting on the side of a flat bed without using bedrails: A lot  Moving to and from bed to chair (including a wheelchair): A lot  Standing up from a chair using your arms (e.g. wheelchair or bedside chair): A lot  To walk in hospital room: A lot  Climbing 3-5 steps with railing: Total  Basic Mobility - Total Score: 11    Encounter Problems       Encounter Problems (Active)       Balance       Patient will complete static and dynamic sitting balance tasks with SBA to improve upright posture, core activation, and proximal stability.        Start:  06/17/24    Expected End:  07/01/24            Patient to demo static standing with unilateral UE support, performing single UE task with SBA, no sway or LOB x 2 mins for functional carryover        Start:  06/17/24    Expected End:  07/01/24               Mobility       STG - Patient will ambulate >/= 50 ft with CGAx1 and LRAD       Start:  06/17/24    Expected End:  07/01/24            Pt. will  tolerate >/= 15 minutes of OOB mobility without seated rest break and VSS to demo improved activity tolerance/endurance.        Start:  06/17/24    Expected End:  07/01/24               PT Transfers       STG - Patient will perform bed mobility IND       Start:  06/17/24    Expected End:  07/01/24            STG - Patient will transfer sit to and from stand Ulisses with LRAD       Start:  06/17/24    Expected End:  07/01/24                   Education Documentation  Body Mechanics, taught by Neelam Krueger PT at 6/17/2024  3:31 PM.  Learner: Patient  Readiness: Acceptance  Method: Explanation, Demonstration  Response: Verbalizes Understanding, Needs Reinforcement    Mobility Training, taught by Neelam Krueger PT at 6/17/2024  3:31 PM.  Learner: Patient  Readiness: Acceptance  Method: Explanation, Demonstration  Response: Verbalizes Understanding, Needs Reinforcement    Education Comments  No comments found.        06/17/24 at 3:34 PM - Neelam Krueger, PT

## 2024-06-17 NOTE — CARE PLAN
The patient's goals for the shift include pt will sit in chair throughout shift     The clinical goals for the shift include Pt will remain safe during shift

## 2024-06-17 NOTE — SIGNIFICANT EVENT
Rapid Response RN Note    RADAR score 6 due to the following VS: T 36.3 °Celsius; HR 67; RR 18; BP 94/52; SPO2 93%.     Reviewed above VS with bedside RN via phone.  Pt recently received metoprolol dose which is believed to be cause of decreased BP. No acute change in condition.  No interventions by rapid response team indicated at this time.    Staff to page rapid response for any concerns or acute change in condition/VS. Chico Barahona RN.

## 2024-06-17 NOTE — PROGRESS NOTES
[facility]  ACUTE CARE SURGERY - PROGRESS NOTE    Patient Name: Enrike Crespo  MRN: [id number]  Admit Date: 613  : 1931  AGE: 89 y.o.   GENDER: male  ==============================================================================  TODAY'S ASSESSMENT AND PLAN OF CARE:  Enrike Crespo is a 89 y.o. male with PMH sick sinus syndrome status post pacemaker, hypertension, seizure, DM, afib, hyperthyroidism, PE, HF, cardiac arrest with defibrillator, stroke, sebaceous cysts, who presented for SBO 2/2 possible internal hernia.     GGC w/ SBFT  showed contrast retained in the stomach which was subsequently suctioned out from NGT after back on suction for increasing oxygen requirement. Abdominal exam is unchanged. Patient had about 4.8L out from NGT with new SHERLYN. Increased IVF to 100mL/hr but due to concern of heart failure and increased oxygen requirement, will hold off any any fluid boluses. Creatinine is down trending today. Still requiring 3L NC for SpO2 >90%. Not having consistent flatus or bowel movements but NGT output is decreased. Plan for CT A/P non con (concern for SHERLYN) with oral contrast and diagnostic and therapeutic for SBO. Also will give an enema.    Neuro  - pain control with oxycodone    CV  - continue home metoprolol   - continue home statin    Pulm  - wean NC as tolerated  - RT     GI  - NPO  - continue NGT  - enema  - CT A/P non con (concern for SHERLYN) with oral contrast       - D5W 1/2NS Kcl 20 @100 mL/hr  - continue home finasteride   - Is and Os  - replete as necessary  - SHERLYN resolved today from 1.71 to 1.2     Heme  - DVT ppx with LVH    Endo   - continue home PTU  - SSI - continue to  monitor BS    Skin  - Triamcinolone cream - home med      ID  - daily CBC  - CTX for UTI     Dispo: RNF     Discussed with Guzman.     Bettie Pederson MD  Acute Care Surgery   P [id number]    CHIEF COMPLAINT / EVENTS LAST 24HRS / HPI:  ROSEANN. Reports pain is controlled. Denies  nausea or vomiting or passing flatus or bowel movements.    MEDICAL HISTORY / ROS:   Admission history and ROS reviewed. Pertinent changes as follows:  NA    PHYSICAL EXAM:  Heart Rate:  [63-83]   Temp:  [35.9 °C (96.6 °F)-36.9 °C (98.4 °F)]   Resp:  [18]   BP: ()/(52-98)   SpO2:  [92 %-96 %]   Physical Exam  HENT:      Head: Normocephalic.      Mouth/Throat:      Mouth: Mucous membranes are dry.   Eyes:      Pupils: Pupils are equal, round, and reactive to light.   Cardiovascular:      Rate and Rhythm: Normal rate.   Pulmonary:      Effort: Pulmonary effort is normal.      Comments: 3 L NC  Abdominal:      General: Abdomen is flat. There is no distension.      Palpations: Abdomen is soft.      Tenderness: There is no abdominal tenderness. There is no guarding.   Musculoskeletal:      Cervical back: Normal range of motion.   Neurological:      Mental Status: He is alert.         IMAGING SUMMARY:  (summary of new imaging findings, not a copy of dictation)  NA    LABS:  Results from last 7 days   Lab Units 06/17/24  0620 06/16/24  0525 06/15/24  0541 06/14/24  1045 06/14/24  0210 06/13/24  0448 06/12/24  1531   WBC AUTO x10*3/uL 8.0 6.5 4.9 3.0* 2.7*   < > 11.9*   HEMOGLOBIN g/dL 11.9* 12.3* 12.4* 12.8* 13.1*   < > 12.4*   HEMATOCRIT % 39.5* 39.2* 39.5* 39.7* 38.9*   < > 39.4*   PLATELETS AUTO x10*3/uL 191 198 232 264 234   < > 231   NEUTROS PCT AUTO %  --   --   --   --   --   --  84.3   LYMPHO PCT MAN %  --   --   --  11.2 10.4  --   --    LYMPHS PCT AUTO %  --   --   --   --   --   --  9.3   MONO PCT MAN %  --   --   --  13.8 15.2  --   --    MONOS PCT AUTO %  --   --   --   --   --   --  5.8   EOSINO PCT MAN %  --   --   --  0.9 0.0  --   --    EOS PCT AUTO %  --   --   --   --   --   --  0.2    < > = values in this interval not displayed.     Results from last 7 days   Lab Units 06/14/24  0210   APTT seconds 30   INR  1.6*     Results from last 7 days   Lab Units 06/17/24  0620 06/16/24  0526 06/15/24  1925  06/14/24  0313 06/14/24  0210 06/13/24  0448 06/12/24  1531   SODIUM mmol/L 149* 150* 149*   < > 142   < > 139   POTASSIUM mmol/L 3.7 3.6 3.4*   < > 6.4*   < > 4.2   CHLORIDE mmol/L 104 100 95*   < > 101   < > 105   CO2 mmol/L 36* 39* 42*   < > 30   < > 25   BUN mg/dL 36* 54* 58*   < > 33*   < > 20   CREATININE mg/dL 1.20 1.71* 2.00*   < > 1.19   < > 1.12   CALCIUM mg/dL 8.6 8.7 8.8   < > 9.0   < > 9.4   PROTEIN TOTAL g/dL  --   --   --   --  6.7  --  6.7   BILIRUBIN TOTAL mg/dL  --   --   --   --  0.5  --  0.3   ALK PHOS U/L  --   --   --   --  59  --  72   ALT U/L  --   --   --   --  9*  --  7*   AST U/L  --   --   --   --  38  --  12   GLUCOSE mg/dL 207* 100* 97   < > 70*   < > 125*    < > = values in this interval not displayed.     Results from last 7 days   Lab Units 06/14/24 0210 06/14/24  0034 06/12/24  1531   BILIRUBIN TOTAL mg/dL 0.5  --  0.3   BILIRUBIN DIRECT mg/dL  --  0.1  --            I have reviewed all medications, laboratory results, and imaging pertinent for today's encounter.

## 2024-06-18 ENCOUNTER — APPOINTMENT (OUTPATIENT)
Dept: CARDIOLOGY | Facility: HOSPITAL | Age: 89
End: 2024-06-18
Payer: MEDICARE

## 2024-06-18 ENCOUNTER — ANESTHESIA (OUTPATIENT)
Dept: OPERATING ROOM | Facility: HOSPITAL | Age: 89
End: 2024-06-18
Payer: MEDICARE

## 2024-06-18 LAB
ABO GROUP (TYPE) IN BLOOD: NORMAL
ALBUMIN SERPL BCP-MCNC: 3 G/DL (ref 3.4–5)
ANION GAP SERPL CALC-SCNC: 13 MMOL/L (ref 10–20)
ANTIBODY SCREEN: NORMAL
BODY SURFACE AREA: 1.91 M2
BUN SERPL-MCNC: 30 MG/DL (ref 6–23)
CALCIUM SERPL-MCNC: 8.5 MG/DL (ref 8.6–10.6)
CHLORIDE SERPL-SCNC: 105 MMOL/L (ref 98–107)
CO2 SERPL-SCNC: 34 MMOL/L (ref 21–32)
CREAT SERPL-MCNC: 1.02 MG/DL (ref 0.5–1.3)
EGFRCR SERPLBLD CKD-EPI 2021: 69 ML/MIN/1.73M*2
ERYTHROCYTE [DISTWIDTH] IN BLOOD BY AUTOMATED COUNT: 12.8 % (ref 11.5–14.5)
GLUCOSE BLD MANUAL STRIP-MCNC: 178 MG/DL (ref 74–99)
GLUCOSE SERPL-MCNC: 170 MG/DL (ref 74–99)
HCT VFR BLD AUTO: 38.3 % (ref 41–52)
HGB BLD-MCNC: 11.8 G/DL (ref 13.5–17.5)
MAGNESIUM SERPL-MCNC: 1.9 MG/DL (ref 1.6–2.4)
MCH RBC QN AUTO: 31.1 PG (ref 26–34)
MCHC RBC AUTO-ENTMCNC: 30.8 G/DL (ref 32–36)
MCV RBC AUTO: 101 FL (ref 80–100)
NRBC BLD-RTO: 0 /100 WBCS (ref 0–0)
PHOSPHATE SERPL-MCNC: 1.4 MG/DL (ref 2.5–4.9)
PLATELET # BLD AUTO: 161 X10*3/UL (ref 150–450)
POTASSIUM SERPL-SCNC: 4 MMOL/L (ref 3.5–5.3)
RBC # BLD AUTO: 3.79 X10*6/UL (ref 4.5–5.9)
RH FACTOR (ANTIGEN D): NORMAL
SODIUM SERPL-SCNC: 148 MMOL/L (ref 136–145)
WBC # BLD AUTO: 8.8 X10*3/UL (ref 4.4–11.3)

## 2024-06-18 PROCEDURE — A4217 STERILE WATER/SALINE, 500 ML: HCPCS | Performed by: SURGERY

## 2024-06-18 PROCEDURE — 86901 BLOOD TYPING SEROLOGIC RH(D): CPT

## 2024-06-18 PROCEDURE — 93282 PRGRMG EVAL IMPLANTABLE DFB: CPT

## 2024-06-18 PROCEDURE — 2720000007 HC OR 272 NO HCPCS: Performed by: SURGERY

## 2024-06-18 PROCEDURE — 7100000001 HC RECOVERY ROOM TIME - INITIAL BASE CHARGE: Performed by: SURGERY

## 2024-06-18 PROCEDURE — 2500000004 HC RX 250 GENERAL PHARMACY W/ HCPCS (ALT 636 FOR OP/ED)

## 2024-06-18 PROCEDURE — 93282 PRGRMG EVAL IMPLANTABLE DFB: CPT | Performed by: INTERNAL MEDICINE

## 2024-06-18 PROCEDURE — 2500000002 HC RX 250 W HCPCS SELF ADMINISTERED DRUGS (ALT 637 FOR MEDICARE OP, ALT 636 FOR OP/ED)

## 2024-06-18 PROCEDURE — 3600000003 HC OR TIME - INITIAL BASE CHARGE - PROCEDURE LEVEL THREE: Performed by: SURGERY

## 2024-06-18 PROCEDURE — 80069 RENAL FUNCTION PANEL: CPT

## 2024-06-18 PROCEDURE — 88307 TISSUE EXAM BY PATHOLOGIST: CPT | Performed by: STUDENT IN AN ORGANIZED HEALTH CARE EDUCATION/TRAINING PROGRAM

## 2024-06-18 PROCEDURE — 2500000001 HC RX 250 WO HCPCS SELF ADMINISTERED DRUGS (ALT 637 FOR MEDICARE OP)

## 2024-06-18 PROCEDURE — P9045 ALBUMIN (HUMAN), 5%, 250 ML: HCPCS | Mod: JZ

## 2024-06-18 PROCEDURE — 83735 ASSAY OF MAGNESIUM: CPT

## 2024-06-18 PROCEDURE — C9113 INJ PANTOPRAZOLE SODIUM, VIA: HCPCS

## 2024-06-18 PROCEDURE — 2500000005 HC RX 250 GENERAL PHARMACY W/O HCPCS

## 2024-06-18 PROCEDURE — 0DN80ZZ RELEASE SMALL INTESTINE, OPEN APPROACH: ICD-10-PCS | Performed by: SURGERY

## 2024-06-18 PROCEDURE — 0D9670Z DRAINAGE OF STOMACH WITH DRAINAGE DEVICE, VIA NATURAL OR ARTIFICIAL OPENING: ICD-10-PCS | Performed by: SURGERY

## 2024-06-18 PROCEDURE — 44120 REMOVAL OF SMALL INTESTINE: CPT | Performed by: SURGERY

## 2024-06-18 PROCEDURE — 1200000002 HC GENERAL ROOM WITH TELEMETRY DAILY

## 2024-06-18 PROCEDURE — 3700000001 HC GENERAL ANESTHESIA TIME - INITIAL BASE CHARGE: Performed by: SURGERY

## 2024-06-18 PROCEDURE — 82947 ASSAY GLUCOSE BLOOD QUANT: CPT | Mod: 91

## 2024-06-18 PROCEDURE — 85027 COMPLETE CBC AUTOMATED: CPT

## 2024-06-18 PROCEDURE — 88307 TISSUE EXAM BY PATHOLOGIST: CPT | Mod: TC,SUR | Performed by: SURGERY

## 2024-06-18 PROCEDURE — 7100000002 HC RECOVERY ROOM TIME - EACH INCREMENTAL 1 MINUTE: Performed by: SURGERY

## 2024-06-18 PROCEDURE — 36415 COLL VENOUS BLD VENIPUNCTURE: CPT

## 2024-06-18 PROCEDURE — 0DB80ZZ EXCISION OF SMALL INTESTINE, OPEN APPROACH: ICD-10-PCS | Performed by: SURGERY

## 2024-06-18 PROCEDURE — 2500000004 HC RX 250 GENERAL PHARMACY W/ HCPCS (ALT 636 FOR OP/ED): Performed by: SURGERY

## 2024-06-18 PROCEDURE — 3700000002 HC GENERAL ANESTHESIA TIME - EACH INCREMENTAL 1 MINUTE: Performed by: SURGERY

## 2024-06-18 PROCEDURE — 3600000008 HC OR TIME - EACH INCREMENTAL 1 MINUTE - PROCEDURE LEVEL THREE: Performed by: SURGERY

## 2024-06-18 RX ORDER — ONDANSETRON HYDROCHLORIDE 2 MG/ML
4 INJECTION, SOLUTION INTRAVENOUS ONCE AS NEEDED
Status: DISCONTINUED | OUTPATIENT
Start: 2024-06-18 | End: 2024-06-18 | Stop reason: HOSPADM

## 2024-06-18 RX ORDER — LABETALOL HYDROCHLORIDE 5 MG/ML
5 INJECTION, SOLUTION INTRAVENOUS ONCE AS NEEDED
Status: DISCONTINUED | OUTPATIENT
Start: 2024-06-18 | End: 2024-06-18 | Stop reason: HOSPADM

## 2024-06-18 RX ORDER — SODIUM CHLORIDE, SODIUM LACTATE, POTASSIUM CHLORIDE, CALCIUM CHLORIDE 600; 310; 30; 20 MG/100ML; MG/100ML; MG/100ML; MG/100ML
INJECTION, SOLUTION INTRAVENOUS CONTINUOUS PRN
Status: DISCONTINUED | OUTPATIENT
Start: 2024-06-18 | End: 2024-06-18

## 2024-06-18 RX ORDER — OXYCODONE HYDROCHLORIDE 5 MG/1
5 TABLET ORAL EVERY 4 HOURS PRN
Status: DISCONTINUED | OUTPATIENT
Start: 2024-06-18 | End: 2024-06-18 | Stop reason: HOSPADM

## 2024-06-18 RX ORDER — SODIUM CHLORIDE 0.9 G/100ML
IRRIGANT IRRIGATION AS NEEDED
Status: DISCONTINUED | OUTPATIENT
Start: 2024-06-18 | End: 2024-06-18 | Stop reason: HOSPADM

## 2024-06-18 RX ORDER — PROPOFOL 10 MG/ML
INJECTION, EMULSION INTRAVENOUS AS NEEDED
Status: DISCONTINUED | OUTPATIENT
Start: 2024-06-18 | End: 2024-06-18

## 2024-06-18 RX ORDER — PHENYLEPHRINE 10 MG/250 ML(40 MCG/ML)IN 0.9 % SOD.CHLORIDE INTRAVENOUS
CONTINUOUS PRN
Status: DISCONTINUED | OUTPATIENT
Start: 2024-06-18 | End: 2024-06-18

## 2024-06-18 RX ORDER — SODIUM CHLORIDE, SODIUM LACTATE, POTASSIUM CHLORIDE, CALCIUM CHLORIDE 600; 310; 30; 20 MG/100ML; MG/100ML; MG/100ML; MG/100ML
50 INJECTION, SOLUTION INTRAVENOUS CONTINUOUS
Status: DISCONTINUED | OUTPATIENT
Start: 2024-06-18 | End: 2024-06-20

## 2024-06-18 RX ORDER — MEPERIDINE HYDROCHLORIDE 25 MG/ML
12.5 INJECTION INTRAMUSCULAR; INTRAVENOUS; SUBCUTANEOUS EVERY 10 MIN PRN
Status: DISCONTINUED | OUTPATIENT
Start: 2024-06-18 | End: 2024-06-18 | Stop reason: HOSPADM

## 2024-06-18 RX ORDER — ALBUTEROL SULFATE 0.83 MG/ML
2.5 SOLUTION RESPIRATORY (INHALATION) ONCE AS NEEDED
Status: DISCONTINUED | OUTPATIENT
Start: 2024-06-18 | End: 2024-06-18 | Stop reason: HOSPADM

## 2024-06-18 RX ORDER — HYDROMORPHONE HYDROCHLORIDE 1 MG/ML
0.2 INJECTION, SOLUTION INTRAMUSCULAR; INTRAVENOUS; SUBCUTANEOUS EVERY 5 MIN PRN
Status: DISCONTINUED | OUTPATIENT
Start: 2024-06-18 | End: 2024-06-18 | Stop reason: HOSPADM

## 2024-06-18 RX ORDER — ROCURONIUM BROMIDE 10 MG/ML
INJECTION, SOLUTION INTRAVENOUS AS NEEDED
Status: DISCONTINUED | OUTPATIENT
Start: 2024-06-18 | End: 2024-06-18

## 2024-06-18 RX ORDER — DROPERIDOL 2.5 MG/ML
0.62 INJECTION, SOLUTION INTRAMUSCULAR; INTRAVENOUS ONCE AS NEEDED
Status: DISCONTINUED | OUTPATIENT
Start: 2024-06-18 | End: 2024-06-18 | Stop reason: HOSPADM

## 2024-06-18 RX ORDER — MIDAZOLAM HYDROCHLORIDE 1 MG/ML
1 INJECTION INTRAMUSCULAR; INTRAVENOUS ONCE AS NEEDED
Status: DISCONTINUED | OUTPATIENT
Start: 2024-06-18 | End: 2024-06-18 | Stop reason: HOSPADM

## 2024-06-18 RX ORDER — FENTANYL CITRATE 50 UG/ML
INJECTION, SOLUTION INTRAMUSCULAR; INTRAVENOUS AS NEEDED
Status: DISCONTINUED | OUTPATIENT
Start: 2024-06-18 | End: 2024-06-18

## 2024-06-18 RX ORDER — NORETHINDRONE AND ETHINYL ESTRADIOL 0.5-0.035
KIT ORAL AS NEEDED
Status: DISCONTINUED | OUTPATIENT
Start: 2024-06-18 | End: 2024-06-18

## 2024-06-18 RX ORDER — HYDROMORPHONE HYDROCHLORIDE 1 MG/ML
0.5 INJECTION, SOLUTION INTRAMUSCULAR; INTRAVENOUS; SUBCUTANEOUS EVERY 5 MIN PRN
Status: DISCONTINUED | OUTPATIENT
Start: 2024-06-18 | End: 2024-06-18 | Stop reason: HOSPADM

## 2024-06-18 RX ORDER — PROPYLTHIOURACIL 50 MG/1
50 TABLET ORAL 3 TIMES DAILY
Status: DISPENSED | OUTPATIENT
Start: 2024-06-18

## 2024-06-18 RX ORDER — ALBUMIN HUMAN 50 G/1000ML
SOLUTION INTRAVENOUS AS NEEDED
Status: DISCONTINUED | OUTPATIENT
Start: 2024-06-18 | End: 2024-06-18

## 2024-06-18 RX ORDER — LIDOCAINE HCL/PF 100 MG/5ML
SYRINGE (ML) INTRAVENOUS AS NEEDED
Status: DISCONTINUED | OUTPATIENT
Start: 2024-06-18 | End: 2024-06-18

## 2024-06-18 RX ORDER — DIPHENHYDRAMINE HYDROCHLORIDE 50 MG/ML
12.5 INJECTION INTRAMUSCULAR; INTRAVENOUS ONCE AS NEEDED
Status: DISCONTINUED | OUTPATIENT
Start: 2024-06-18 | End: 2024-06-18 | Stop reason: HOSPADM

## 2024-06-18 RX ORDER — CEFAZOLIN 1 G/1
INJECTION, POWDER, FOR SOLUTION INTRAVENOUS AS NEEDED
Status: DISCONTINUED | OUTPATIENT
Start: 2024-06-18 | End: 2024-06-18

## 2024-06-18 RX ORDER — SODIUM CHLORIDE, SODIUM LACTATE, POTASSIUM CHLORIDE, CALCIUM CHLORIDE 600; 310; 30; 20 MG/100ML; MG/100ML; MG/100ML; MG/100ML
100 INJECTION, SOLUTION INTRAVENOUS CONTINUOUS
Status: DISCONTINUED | OUTPATIENT
Start: 2024-06-18 | End: 2024-06-18 | Stop reason: HOSPADM

## 2024-06-18 RX ORDER — PHENYLEPHRINE HCL IN 0.9% NACL 0.4MG/10ML
SYRINGE (ML) INTRAVENOUS AS NEEDED
Status: DISCONTINUED | OUTPATIENT
Start: 2024-06-18 | End: 2024-06-18

## 2024-06-18 RX ORDER — PRAVASTATIN SODIUM 20 MG/1
20 TABLET ORAL DAILY
Status: DISPENSED | OUTPATIENT
Start: 2024-06-19

## 2024-06-18 RX ORDER — ONDANSETRON HYDROCHLORIDE 2 MG/ML
INJECTION, SOLUTION INTRAVENOUS AS NEEDED
Status: DISCONTINUED | OUTPATIENT
Start: 2024-06-18 | End: 2024-06-18

## 2024-06-18 ASSESSMENT — COGNITIVE AND FUNCTIONAL STATUS - GENERAL
DAILY ACTIVITIY SCORE: 12
MOBILITY SCORE: 11
WALKING IN HOSPITAL ROOM: A LOT
CLIMB 3 TO 5 STEPS WITH RAILING: TOTAL
STANDING UP FROM CHAIR USING ARMS: A LOT
DRESSING REGULAR LOWER BODY CLOTHING: A LOT
DRESSING REGULAR UPPER BODY CLOTHING: A LOT
TOILETING: A LOT
PERSONAL GROOMING: A LOT
TURNING FROM BACK TO SIDE WHILE IN FLAT BAD: A LOT
HELP NEEDED FOR BATHING: A LOT
MOVING FROM LYING ON BACK TO SITTING ON SIDE OF FLAT BED WITH BEDRAILS: A LOT
EATING MEALS: A LOT
MOVING TO AND FROM BED TO CHAIR: A LOT

## 2024-06-18 ASSESSMENT — PAIN - FUNCTIONAL ASSESSMENT
PAIN_FUNCTIONAL_ASSESSMENT: VAS (VISUAL ANALOG SCALE)
PAIN_FUNCTIONAL_ASSESSMENT: 0-10
PAIN_FUNCTIONAL_ASSESSMENT: 0-10

## 2024-06-18 ASSESSMENT — PAIN SCALES - GENERAL
PAIN_LEVEL: 0
PAINLEVEL_OUTOF10: 0 - NO PAIN
PAINLEVEL_OUTOF10: 5 - MODERATE PAIN
PAINLEVEL_OUTOF10: 0 - NO PAIN

## 2024-06-18 ASSESSMENT — ENCOUNTER SYMPTOMS
LOSS OF SENSATION IN FEET: 0
DEPRESSION: 0
OCCASIONAL FEELINGS OF UNSTEADINESS: 0

## 2024-06-18 NOTE — PROGRESS NOTES
Physical Therapy                 Therapy Communication Note    Patient Name: Enrike Crespo  MRN: [id number]  Today's Date: 6/18/2024     Discipline: Physical Therapy    Missed Visit Reason: Missed Visit Reason: Patient in a medical procedure (1054. Pt. off division in OR. Will reattempt following procedure as appropriate.)    Missed Time: Attempt    Comment:

## 2024-06-18 NOTE — ANESTHESIA PREPROCEDURE EVALUATION
Patient: Enrike Crespo    Procedure Information       Date/Time: 06/18/24 0955    Procedure: Exploration Laparotomy, possible bowel resection, possible stoma creation (Abdomen)    Location: [facility] OR 11 / [facility] OR    Surgeons: Chapo Hammond, DO            Relevant Problems   No relevant active problems       Clinical information reviewed:    Allergies                NPO Detail:  No data recorded     Physical Exam    Airway  Mallampati: III  TM distance: >3 FB  Neck ROM: full     Cardiovascular   Rhythm: regular  Rate: normal     Dental    Pulmonary   (+) decreased breath sounds     Abdominal   Abdomen: soft  Bowel sounds: normal           Anesthesia Plan    History of general anesthesia?: yes  History of complications of general anesthesia?: no    ASA 3     general     intravenous induction   Postoperative administration of opioids is intended.  Trial extubation is planned.  Anesthetic plan and risks discussed with patient.  Use of blood products discussed with patient who.    Plan discussed with CRNA and CAA.

## 2024-06-18 NOTE — OP NOTE
Exploration laparotomy, lysis of adhesions, small bowel resection with anastamosis, closure Operative Note     Date: 2024  OR Location: [facility] OR    Name: Enrike Crespo, : 1931, Age: 89 y.o., MRN: [id number], Sex: male    Diagnosis  Pre-op Diagnosis     * Small bowel obstruction (Multi) [K56.609] Post-op Diagnosis     * Small bowel obstruction (Multi) [K56.609]     Procedures  Exploration laparotomy, lysis of adhesions, small bowel resection with anastamosis, closure  07316 - PA EXPLORATORY LAPAROTOMY CELIOTOMY W/WO BIOPSY SPX      Surgeons      * Chapo Hammond - Primary    Resident/Fellow/Other Assistant:  Surgeons and Role:     * Ava Koch MD - Resident - Assisting     * Hugo Ruiz MD - Resident - Assisting    Procedure Summary  Anesthesia: General  ASA: III  Anesthesia Staff: Anesthesiologist: Clayton Hills MD  CRNA: TERRY Aggarwal-CRNA  SRNA: Jacqueline Bales  Estimated Blood Loss: 25mL  Intra-op Medications:   Administrations occurring from 0955 to 1235 on 24:   Medication Name Total Dose   cefTRIAXone (Rocephin) IVPB 1 g Cannot be calculated   dextrose 50 % injection 12.5 g Cannot be calculated   dextrose 50 % injection 25 g Cannot be calculated   enoxaparin (Lovenox) syringe 40 mg Cannot be calculated   ferrous sulfate (325 mg ferrous sulfate) tablet 1 tablet Cannot be calculated   glucagon (Glucagen) injection 1 mg Cannot be calculated   glucagon (Glucagen) injection 1 mg Cannot be calculated   HYDROmorphone (Dilaudid) injection 0.2 mg Cannot be calculated   HYDROmorphone (Dilaudid) injection 0.4 mg Cannot be calculated   insulin glargine (Lantus) injection 7 Units Cannot be calculated   insulin lispro (HumaLOG) injection 0-5 Units Cannot be calculated   ipratropium-albuteroL (Duo-Neb) 0.5-2.5 mg/3 mL nebulizer solution 3 mL Cannot be calculated   metoprolol tartrate (Lopressor) injection 5 mg Cannot be calculated   naloxone (Narcan) injection 0.2 mg Cannot be  calculated   ondansetron (Zofran) injection 4 mg Cannot be calculated   pantoprazole (ProtoNix) injection 40 mg Cannot be calculated   polyethylene glycol (Glycolax, Miralax) packet 17 g Cannot be calculated   pravastatin (Pravachol) tablet 20 mg Cannot be calculated   propylthiouracil (PTU) tablet 50 mg Cannot be calculated   triamcinolone (Kenalog) 0.1 % cream Cannot be calculated              Anesthesia Record               Intraprocedure I/O Totals          Intake    Phenylephrine Drip 0.00 mL    The total shown is the total volume documented since Anesthesia Start was filed.    lactated Ringer's 500.00 mL    Total Intake 500 mL       Output    Urine 350 mL    Est. Blood Loss 20 mL    NG/OG Tube Output 175 mL    Total Output 545 mL       Net    Net Volume -45 mL          Specimen:   ID Type Source Tests Collected by Time   1 : small bowel resection, stitch marks proximal Tissue SMALL BOWEL /INTESTINE SEGMENTAL RESECTION SURGICAL PATHOLOGY EXAM Chapo Hammond,  6/18/2024 1222        Staff:   Circulator: Екатерина  Circulator: John  Scrub Person: Jaqueline Bennett Circulator: Linsey Bennett Circulator: Leighann Bennett Scrub: Willie         Drains and/or Catheters:   NG/OG/Feeding Tube NG - Florence sump 16 Fr Left nostril (Active)   Tube Status Low intermittent suction 06/18/24 0730   Placement Verification Measurements 06/15/24 0342   Site Assessment Clean;Dry;Intact 06/18/24 0730   Drainage Appearance Brown 06/18/24 0730   NG/OG Interventions Irrigated 06/15/24 0342   Irrigant Tap water 06/15/24 0342   Response To Intervention No resistance met 06/15/24 0342   Tube Securement Taped to nostril center 06/18/24 0730   Intake - Flush (mL) 30 mL 06/15/24 0342   Output (mL) 50 mL 06/18/24 0900       Urethral Catheter Non-latex 16 Fr. (Active)           Findings: omental adhesion to RLQ causing closed loop obstruction mid jejunum with ischemic changes. 60 cm resected.     Indications: Enrike Crespo is an 89 y.o. male who  "is having surgery for Small bowel obstruction (Multi) [K56.60].     The patient was seen in the preoperative area. The risks, benefits, complications, treatment options, non-operative alternatives, expected recovery and outcomes were discussed with the patient. The possibilities of reaction to medication, pulmonary aspiration, injury to surrounding structures, bleeding, recurrent infection, the need for additional procedures, failure to diagnose a condition, and creating a complication requiring transfusion or operation were discussed with the patient. The patient concurred with the proposed plan, giving informed consent.  The site of surgery was properly noted/marked if necessary per policy. The patient has been actively warmed in preoperative area. Preoperative antibiotics have been ordered and given within 1 hours of incision. Venous thrombosis prophylaxis are not indicated.    Procedure Details: The patient was taken the operating room and placed supine on the operating table.  A timeout was performed confirming correct patient, procedure, perioperative antibiotics, chemoprophylaxis, positioning, and disposition amongst other information.  The patient was then induced with general anesthesia and intubated by the anesthesia team without issue.  Both arms were placed out.  The abdomen was then prepped and draped in standard sterile fashion. Before beginning the procedure another \"time-out\" was performed and verified as correct.    A judicious midline laparotomy was made and the abdomen was entered sharply without injury.  Upon entry to the abdomen there was small amounts of reactive ascites. A survey of the abdomen was performed and abnormality was noted in abdominal viscera other than the bowel. The small bowel was ran from ligament of Treitz and immediately encountered edematous and distended loops of small bowel.  We then encountered a transition point caused by an adhesion from the omentum to the right lower " quadrant causing a closed-loop obstruction of the mid jejunum.  There is approximately 60 cm small bowel within this closed-loop that had ischemic changes and stenosis at the proximal and distal aspects of the loop.  This adhesion was released with electrocautery.  We continued to run the rest of the bowel and there were no other issue distally. Total bowel length was greater than 330cm. Upon reexamining the bowel, the obstructed segment was nonviable with ischemic changes and thus we elected to perform a small bowel resection.  Using 2 loads of the YOGI 75 mm blue load, we transected the small bowel.  The mesentery was then ligated using the LigaSure.  We then performed a side-to-side stapled anastomosis of the small bowel using a 75 mm blue load YOGI.  The common channel was then closed with a 60 mm blue load transverse stapler. The staple line was imbricated with 3-0 Vicryl.  The mesenteric defect was then closed with 3-0 Vicryl.  We then returned the bowel to the abdomen and laid the mesentery flat. A final review of the abdominal cavity verified hemostasis and again no other pathology. The NG tube was in an appropriate placement and functional.    The first count was correct.  We then closed the fascia using 2 #1 looped PDS, with intermittent simple #1 prolene sutures.  The wound was washed then closed with staples and a dressing was applied. The final count was correct.  The patient overall tolerated the procedure well.  Patient was extubated without issue and transported to the PACU in stable condition.  Final sponge, needle, instrument count was all correct end of the operation.    Disposition: PACU - hemodynamically stable.  Condition: stable         Additional Details:     Attending Attestation: I was present for all portions of the procedure.     Chapo Hammond  Phone Number: [phone]

## 2024-06-18 NOTE — ANESTHESIA POSTPROCEDURE EVALUATION
Patient: Enrike Crespo    Procedure Summary       Date: 06/18/24 Room / Location: [facility] OR 11 / [facility] OR    Anesthesia Start: 1119 Anesthesia Stop: 1335    Procedure: Exploration laparotomy, lysis of adhesions, small bowel resection with anastamosis, closure (Abdomen) Diagnosis:       Small bowel obstruction (Multi)      (Small bowel obstruction (Multi) [K56.609])    Surgeons: Chapo Hammond DO Responsible Provider: Clayton Hills MD    Anesthesia Type: general ASA Status: 3            Anesthesia Type: general    Vitals Value Taken Time   /65 06/18/24 1415   Temp 36.4 °C (97.5 °F) 06/18/24 1415   Pulse 95 06/18/24 1420   Resp 22 06/18/24 1420   SpO2 96 % 06/18/24 1420   Vitals shown include unfiled device data.    Anesthesia Post Evaluation    Patient location during evaluation: PACU  Patient participation: complete - patient cannot participate  Level of consciousness: agitated and confused  Pain score: 0  Pain management: adequate  Airway patency: patent  Cardiovascular status: acceptable  Respiratory status: acceptable  Hydration status: acceptable  Postoperative Nausea and Vomiting: none        There were no known notable events for this encounter.

## 2024-06-18 NOTE — CARE PLAN
The patient's goals for the shift include patient will remain comfortable throughout shift     The clinical goals for the shift include pt will remain safe throughout shift

## 2024-06-18 NOTE — PERIOPERATIVE NURSING NOTE
Messaged Dr. Hills and made aware of HR 90 with preop at 56. Pt didn't receive am dose of Metoprolol. BP at baseline, pt states having minimal pain. Per Dr. Hills no treatment at this time, ok for pt to return to inpt room.     Pt transported via bed to [facility].

## 2024-06-18 NOTE — SIGNIFICANT EVENT
RAPID RESPONSE RN NOTE- RADAR 7   06/18/24 1541   Onset Documentation   Rapid Response Initiated By Radar auto page   Location/Room [facility]  (LT 9017)   Pager Time 1540   Arrival Time 1541   Event End Time 1555   Level II Called No   Primary Reason for Call Radar auto page  (RADAR 7)     VS: 36.1, 93, 18, 134/56, 91%.  Reviewed V with Daniel RN- VS consistent with current trend, no acute concerns from Nursing at this time.  RN to contact Rapid Response Team with any further issues or patient deterioration.

## 2024-06-18 NOTE — PERIOPERATIVE NURSING NOTE
Patient's hearing aid and dentures in green denture containers labeled with patient stickers given to Nurse Smith in Pre-Op. At 1208.

## 2024-06-18 NOTE — H&P
"H&P reviewed from 6/14/24. The patient was examined and there are no changes to the H&P.  Patient is planned for ex lap, possible bowel resection, and possible ostomy creation for small bowel obstruction unresolved with nonoperative management.      Consent obtained. Risks and benefits including alternatives discussed with patient and family.     \"History Of Present Illness  Enrike Crespo is a 89 y.o. male presenting with small bowel obstruction 2/2 internal hernia.     Past Medical History  Medical History        Past Medical History:   Diagnosis Date    Bleeding hemorrhoid      Cardiac arrest (Multi)      Diabetes (Multi)      Heart failure (Multi)              Surgical History  Surgical History         Past Surgical History:   Procedure Laterality Date    CARDIAC DEFIBRILLATOR PLACEMENT                Social History  He reports that he has been smoking cigarettes. He has a 75 pack-year smoking history. He has never used smokeless tobacco. He reports that he does not currently use alcohol. He reports that he does not use drugs.     Family History  Family History          Family History   Problem Relation Name Age of Onset    Heart disease Mother                Allergies  Penicillins     Review of Systems   Negative unless otherwise specified in HPI.      Physical Exam   Constitutional: no acute distress  Neuro: A/O, no gross deficits   Psych: normal affect  HEENT: No deformities, no scleral icterus   Cardiac: RRR  Pulmonary: unlabored respirations   Abdomen: moderately distended tender  Skin: warm and dry overall    Extremities: no swelling noted  MSK: moving all four     Last Recorded Vitals  Blood pressure 122/66, pulse 88, temperature 36.6 °C (97.9 °F), temperature source Temporal, resp. rate 22, height 1.753 m (5' 9\"), weight 75.2 kg (165 lb 12.6 oz), SpO2 92%.     Relevant Results        CT scan reviewed        Assessment/Plan   Principal Problem:    Small bowel obstruction (Multi)        89 y.o. male " "Hospital Day #0  Procedure(s):  Exploration Laparotomy  Pre-Op Diagnosis Codes:     * Small bowel obstruction (Multi) [K56.609]         Emanuel Sandoval MD\"       "

## 2024-06-18 NOTE — SIGNIFICANT EVENT
Postoperative Check Note    Subjective  Enrike Crespo is a 89 y.o. male who is now POD0 s/p Exploration laparotomy, lysis of adhesions, small bowel resection with anastamosis, closure. Patient was sleeping at bedside when postoperative check. Postoperatively, patient feels well, and denies fevers/chills, n/v, chest pain, shortness of breath(although 3 L of o2). Feels his pain is well-controlled, but occasionally feels 5/10 pain and appropriate for this time. Has no other concerns.    Objective  Vitals:  Visit Vitals  /56 (BP Location: Left arm, Patient Position: Lying)   Pulse 93   Temp 36.1 °C (97 °F) (Temporal)   Resp 18       Physical Exam:  GEN: No acute distress. Alert, awake and conversive.  HEENT: Sclera anicteric. Moist mucous membranes. NGT in place with gastric output.  RESP: Breathing non-labored, equal chest rise. On 3L NC.  CV: Regular rate, normotensive  GI: Abdomen soft, nondistended, appropriately tender for postoperative course. Midline  incision covered with dressing with some strikethrough   :  voiding  MSK: No gross deformities. Moves all extremities spontaneously.  NEURO: Alert and oriented x3. No focal deficits.  PSYCH: Appropriate mood and affect.  SKIN: Dressings in place over midline incision, minimal strikethrough.    Most recent labs:            11.8     8.8>-----<161              38.3     148  105  30                  ----------------<170     4.0  34  1.02            Mg 1.90         Assessment  Enrike Crespo is a 89 y.o. male who is now POD0 s/p Exploration laparotomy, lysis of adhesions, small bowel resection with anastamosis, closure. .  Patient is in stable condition, appropriate for postoperative course. The plan is as follows:    - Will continue to optimize pain management, current pain regimen PRN dilaudid  - NPO   -  LR @ 100mL/hr     Marin Argueta MD  PGY-1 General Surgery  Acute Care Surgery [id number]

## 2024-06-18 NOTE — ANESTHESIA PROCEDURE NOTES
Airway  Date/Time: 6/18/2024 11:32 AM  Urgency: elective    Airway not difficult    Staffing  Performed: CRNA   Authorized by: Clayton Hills MD    Performed by: Jacqueline Bales  Patient location during procedure: OR    Indications and Patient Condition  Indications for airway management: anesthesia and airway protection  Spontaneous ventilation: present  Sedation level: deep  Preoxygenated: yes  Patient position: sniffing  Mask difficulty assessment: 0 - not attempted  Planned trial extubation    Final Airway Details  Final airway type: endotracheal airway      Successful airway: ETT  Cuffed: yes   Successful intubation technique: direct laryngoscopy  Facilitating devices/methods: intubating stylet and anterior pressure/BURP  Endotracheal tube insertion site: oral  Blade: Ignacio  Blade size: #4  Cormack-Lehane Classification: grade I - full view of glottis  Placement verified by: chest auscultation and capnometry   Measured from: gums  Number of attempts at approach: 1

## 2024-06-19 ENCOUNTER — APPOINTMENT (OUTPATIENT)
Dept: RADIOLOGY | Facility: HOSPITAL | Age: 89
End: 2024-06-19
Payer: MEDICARE

## 2024-06-19 LAB
ALBUMIN SERPL BCP-MCNC: 2.9 G/DL (ref 3.4–5)
ANION GAP SERPL CALC-SCNC: 13 MMOL/L (ref 10–20)
APTT PPP: 31 SECONDS (ref 27–38)
BUN SERPL-MCNC: 24 MG/DL (ref 6–23)
CALCIUM SERPL-MCNC: 8.1 MG/DL (ref 8.6–10.6)
CHLORIDE SERPL-SCNC: 106 MMOL/L (ref 98–107)
CO2 SERPL-SCNC: 33 MMOL/L (ref 21–32)
CREAT SERPL-MCNC: 1.09 MG/DL (ref 0.5–1.3)
EGFRCR SERPLBLD CKD-EPI 2021: 63 ML/MIN/1.73M*2
ERYTHROCYTE [DISTWIDTH] IN BLOOD BY AUTOMATED COUNT: 12.7 % (ref 11.5–14.5)
GLUCOSE BLD MANUAL STRIP-MCNC: 126 MG/DL (ref 74–99)
GLUCOSE BLD MANUAL STRIP-MCNC: 129 MG/DL (ref 74–99)
GLUCOSE BLD MANUAL STRIP-MCNC: 145 MG/DL (ref 74–99)
GLUCOSE BLD MANUAL STRIP-MCNC: 151 MG/DL (ref 74–99)
GLUCOSE SERPL-MCNC: 135 MG/DL (ref 74–99)
HCT VFR BLD AUTO: 40.3 % (ref 41–52)
HGB BLD-MCNC: 12.2 G/DL (ref 13.5–17.5)
INR PPP: 1.2 (ref 0.9–1.1)
MAGNESIUM SERPL-MCNC: 1.7 MG/DL (ref 1.6–2.4)
MCH RBC QN AUTO: 30.4 PG (ref 26–34)
MCHC RBC AUTO-ENTMCNC: 30.3 G/DL (ref 32–36)
MCV RBC AUTO: 101 FL (ref 80–100)
NRBC BLD-RTO: 0 /100 WBCS (ref 0–0)
PHOSPHATE SERPL-MCNC: 2.1 MG/DL (ref 2.5–4.9)
PLATELET # BLD AUTO: 168 X10*3/UL (ref 150–450)
POTASSIUM SERPL-SCNC: 4.1 MMOL/L (ref 3.5–5.3)
PROTHROMBIN TIME: 13.7 SECONDS (ref 9.8–12.8)
RBC # BLD AUTO: 4.01 X10*6/UL (ref 4.5–5.9)
SODIUM SERPL-SCNC: 148 MMOL/L (ref 136–145)
TRIGL SERPL-MCNC: 83 MG/DL (ref 0–149)
WBC # BLD AUTO: 8.2 X10*3/UL (ref 4.4–11.3)

## 2024-06-19 PROCEDURE — 99223 1ST HOSP IP/OBS HIGH 75: CPT

## 2024-06-19 PROCEDURE — 2500000004 HC RX 250 GENERAL PHARMACY W/ HCPCS (ALT 636 FOR OP/ED)

## 2024-06-19 PROCEDURE — 1200000002 HC GENERAL ROOM WITH TELEMETRY DAILY

## 2024-06-19 PROCEDURE — 2580000001 HC RX 258 IV SOLUTIONS: Performed by: NURSE PRACTITIONER

## 2024-06-19 PROCEDURE — 85610 PROTHROMBIN TIME: CPT

## 2024-06-19 PROCEDURE — 2500000004 HC RX 250 GENERAL PHARMACY W/ HCPCS (ALT 636 FOR OP/ED): Performed by: NURSE PRACTITIONER

## 2024-06-19 PROCEDURE — 2720000007 HC OR 272 NO HCPCS

## 2024-06-19 PROCEDURE — 82947 ASSAY GLUCOSE BLOOD QUANT: CPT | Mod: 91

## 2024-06-19 PROCEDURE — 2500000001 HC RX 250 WO HCPCS SELF ADMINISTERED DRUGS (ALT 637 FOR MEDICARE OP)

## 2024-06-19 PROCEDURE — C1751 CATH, INF, PER/CENT/MIDLINE: HCPCS

## 2024-06-19 PROCEDURE — 36415 COLL VENOUS BLD VENIPUNCTURE: CPT

## 2024-06-19 PROCEDURE — 85027 COMPLETE CBC AUTOMATED: CPT

## 2024-06-19 PROCEDURE — 97530 THERAPEUTIC ACTIVITIES: CPT | Mod: GP

## 2024-06-19 PROCEDURE — C9113 INJ PANTOPRAZOLE SODIUM, VIA: HCPCS

## 2024-06-19 PROCEDURE — 2500000005 HC RX 250 GENERAL PHARMACY W/O HCPCS

## 2024-06-19 PROCEDURE — 3E0436Z INTRODUCTION OF NUTRITIONAL SUBSTANCE INTO CENTRAL VEIN, PERCUTANEOUS APPROACH: ICD-10-PCS | Performed by: NURSE PRACTITIONER

## 2024-06-19 PROCEDURE — 84478 ASSAY OF TRIGLYCERIDES: CPT | Performed by: NURSE PRACTITIONER

## 2024-06-19 PROCEDURE — 97110 THERAPEUTIC EXERCISES: CPT | Mod: GP

## 2024-06-19 PROCEDURE — 2500000005 HC RX 250 GENERAL PHARMACY W/O HCPCS: Performed by: NURSE PRACTITIONER

## 2024-06-19 PROCEDURE — 83735 ASSAY OF MAGNESIUM: CPT

## 2024-06-19 PROCEDURE — 80069 RENAL FUNCTION PANEL: CPT

## 2024-06-19 PROCEDURE — 36569 INSJ PICC 5 YR+ W/O IMAGING: CPT

## 2024-06-19 RX ORDER — ACETAMINOPHEN 10 MG/ML
650 INJECTION, SOLUTION INTRAVENOUS EVERY 6 HOURS SCHEDULED
Status: DISCONTINUED | OUTPATIENT
Start: 2024-06-19 | End: 2024-06-21

## 2024-06-19 RX ORDER — INSULIN LISPRO 100 [IU]/ML
0-5 INJECTION, SOLUTION INTRAVENOUS; SUBCUTANEOUS EVERY 6 HOURS
Status: DISCONTINUED | OUTPATIENT
Start: 2024-06-19 | End: 2024-06-22

## 2024-06-19 RX ORDER — MAGNESIUM SULFATE HEPTAHYDRATE 40 MG/ML
2 INJECTION, SOLUTION INTRAVENOUS ONCE
Status: COMPLETED | OUTPATIENT
Start: 2024-06-19 | End: 2024-06-19

## 2024-06-19 RX ORDER — LIDOCAINE HYDROCHLORIDE 10 MG/ML
5 INJECTION INFILTRATION; PERINEURAL ONCE
Status: DISCONTINUED | OUTPATIENT
Start: 2024-06-19 | End: 2024-06-22

## 2024-06-19 RX ORDER — THIAMINE HYDROCHLORIDE 100 MG/ML
100 INJECTION, SOLUTION INTRAMUSCULAR; INTRAVENOUS DAILY
Status: DISPENSED | OUTPATIENT
Start: 2024-06-19 | End: 2024-06-26

## 2024-06-19 RX ORDER — KETOROLAC TROMETHAMINE 15 MG/ML
15 INJECTION, SOLUTION INTRAMUSCULAR; INTRAVENOUS EVERY 6 HOURS
Status: DISCONTINUED | OUTPATIENT
Start: 2024-06-19 | End: 2024-06-21

## 2024-06-19 ASSESSMENT — PAIN - FUNCTIONAL ASSESSMENT
PAIN_FUNCTIONAL_ASSESSMENT: 0-10

## 2024-06-19 ASSESSMENT — COGNITIVE AND FUNCTIONAL STATUS - GENERAL
TURNING FROM BACK TO SIDE WHILE IN FLAT BAD: A LOT
MOBILITY SCORE: 11
CLIMB 3 TO 5 STEPS WITH RAILING: TOTAL
WALKING IN HOSPITAL ROOM: A LOT
WALKING IN HOSPITAL ROOM: A LOT
DRESSING REGULAR UPPER BODY CLOTHING: A LOT
DAILY ACTIVITIY SCORE: 12
MOBILITY SCORE: 11
TURNING FROM BACK TO SIDE WHILE IN FLAT BAD: A LOT
STANDING UP FROM CHAIR USING ARMS: A LOT
TOILETING: A LOT
DRESSING REGULAR LOWER BODY CLOTHING: A LOT
MOVING FROM LYING ON BACK TO SITTING ON SIDE OF FLAT BED WITH BEDRAILS: A LOT
CLIMB 3 TO 5 STEPS WITH RAILING: TOTAL
HELP NEEDED FOR BATHING: A LOT
PERSONAL GROOMING: A LOT
MOVING TO AND FROM BED TO CHAIR: A LOT
STANDING UP FROM CHAIR USING ARMS: A LOT
EATING MEALS: A LOT
MOVING TO AND FROM BED TO CHAIR: A LOT
MOVING FROM LYING ON BACK TO SITTING ON SIDE OF FLAT BED WITH BEDRAILS: A LOT

## 2024-06-19 ASSESSMENT — PAIN SCALES - GENERAL
PAINLEVEL_OUTOF10: 4
PAINLEVEL_OUTOF10: 4
PAINLEVEL_OUTOF10: 3
PAINLEVEL_OUTOF10: 2
PAINLEVEL_OUTOF10: 5 - MODERATE PAIN
PAINLEVEL_OUTOF10: 6
PAINLEVEL_OUTOF10: 4

## 2024-06-19 ASSESSMENT — PAIN DESCRIPTION - LOCATION: LOCATION: ABDOMEN

## 2024-06-19 NOTE — CONSULTS
"Nutrition Initial Assessment:   Nutrition Assessment    Reason for Assessment: Provider consult order, Dietitian discretion (via secure chat- NPO x 6 days)    Patient is a 89 y.o. male s/p ex lap, lysis of adhesions, Small bowel resection with anastomosis, closure (6/18)    Past Medical History:   Diagnosis Date    Bleeding hemorrhoid     Cardiac arrest (Multi)     Diabetes (Multi)     Hearing aid worn     Heart failure (Multi)     HL (hearing loss)      Past Surgical History:   Procedure Laterality Date    CARDIAC DEFIBRILLATOR PLACEMENT       Communicated via secure chat pt is NPO x 5-6 days; plan for PICC/ TPN per team  Nutrition History:  Energy Intake: Poor < 50 % (during admission)  Food and Nutrient History: Met with patient this morning. Pt is hard of hearing, difficult to conduct interview. Pt reports PTA pt was eating 3x a day, and reports havinga \"good\" appetite. Pt reports he drinks juice every meal. Since admission pt has been NPO and reports he is hungry  Vitamin/Herbal Supplement Use: None at home  Food Allergies/Intolerances:   unknown  GI Symptoms: Nausea  Oral Problems: Swallowing difficulty       Anthropometrics:  Height: 175.3 cm (5' 9\")   Weight: 75.2 kg (165 lb 12.6 oz)   BMI (Calculated): 24.47  IBW/kg (Dietitian Calculated): 72.7 kg  Percent of IBW: 103 %       Weight History:   Wt Readings from Last 15 Encounters:   06/14/24 75.2 kg (165 lb 12.6 oz)   06/12/24 73.5 kg (162 lb)   04/25/24 75.3 kg (166 lb)   03/18/24 75.3 kg (166 lb)   03/07/24 75.3 kg (166 lb)   02/02/24 75.3 kg (166 lb)   01/29/24 75.5 kg (166 lb 6.4 oz)       Weight Change %:  Weight History / % Weight Change: Pt unable to hear question x multiple attempts  Significant Weight Loss: No    Nutrition Focused Physical Exam Findings:  NFPE may be d/t advanced age in addition to lack of intake  Subcutaneous Fat Loss:   Orbital Fat Pads: Severe (dark circles, hollowing and loose skin)  Buccal Fat Pads: Severe (hollow, sunken and " narrow face)  Triceps: Severe (negligible fat tissue)  Muscle Wasting:  Temporalis: Severe (hollowed scooping depression)  Pectoralis (Clavicular Region): Mild-Moderate (some protrusion of clavicle)  Interosseous: Mild-Moderate (slightly depressed area between thumb and forefinger)  Quadriceps: Defer  Gastrocnemius:  (pt wrapped tightly)  Edema:  Edema Location: none recorded  Physical Findings:  Skin: Positive    Nutrition Significant Labs:  TPN/PPN Labs:   Results from last 7 days   Lab Units 06/19/24  0647 06/18/24  0535 06/17/24  0620 06/16/24  0526 06/14/24  0313 06/14/24  0210   GLUCOSE mg/dL 135* 170* 207* 100*   < > 70*   POTASSIUM mmol/L 4.1 4.0 3.7 3.6   < > 6.4*   PHOSPHORUS mg/dL 2.1* 1.4* 2.2* 2.9   < > 4.7   MAGNESIUM mg/dL 1.70 1.90 2.09 2.14   < > 2.02   SODIUM mmol/L 148* 148* 149* 150*   < > 142   CHLORIDE mmol/L 106 105 104 100   < > 101   ALT U/L  --   --   --   --   --  9*   AST U/L  --   --   --   --   --  38   ALK PHOS U/L  --   --   --   --   --  59   BILIRUBIN TOTAL mg/dL  --   --   --   --   --  0.5    < > = values in this interval not displayed.        Nutrition Specific Medications:  Scheduled medications  cefTRIAXone, 1 g, intravenous, q24h  enoxaparin, 40 mg, subcutaneous, q24h  [Held by provider] finasteride, 5 mg, oral, Daily  insulin glargine, 7 Units, subcutaneous, q AM  insulin lispro, 0-5 Units, subcutaneous, TID  ketorolac, 15 mg, intravenous, q6h  lidocaine, 5 mL, infiltration, Once  magnesium sulfate, 2 g, intravenous, Once  metoprolol, 5 mg, intravenous, q6h  pantoprazole, 40 mg, intravenous, BID  pravastatin, 20 mg, nasogastric tube, Daily  propylthiouracil, 50 mg, nasogastric tube, TID  triamcinolone, , Topical, BID      I/O:   Last BM Date: 06/15/24; Stool Appearance: Soft (06/15/24 1424)    Dietary Orders (From admission, onward)       Start     Ordered    06/14/24 0004  NPO Diet Except: Sips with meds; Effective now  Diet effective now        Question:  Except:  Answer:   Sips with meds    06/14/24 0004                     Estimated Needs:   Total Energy Estimated Needs (kCal):  (5887-1492)  Method for Estimating Needs: ABW x 28  Total Protein Estimated Needs (g):  (98+)  Method for Estimating Needs: ABW x 1.3+  Total Fluid Estimated Needs (mL):  (per team)           Nutrition Diagnosis   Malnutrition Diagnosis  Patient has Malnutrition Diagnosis: Yes  Diagnosis Status: New  Malnutrition Diagnosis: Severe malnutrition related to acute disease or injury  As Evidenced by: <=50% of estimated energy needs for >=5 days; severe fat loss            Nutrition Interventions/Recommendations         Nutrition Prescription:    Continue NPO, advance per team discretion     Prior to starting TPN, check baseline TG, RFP, & Mg daily - replete electrolytes as needed.    Recommend 100 mg IV/PO thiamin x 7-10 days d/t patient being at risk for refeeding syndrome.    When central access is obtained, recommend the following continuous TPN regimen:  Day 1: Initiate Standard TPN 5% Amino Acids, 15% Dextrose @ 40mL/hr  Day 2: If BS <180 mg/dL and no major shifts in lytes occur, increase to 65mL/hr  Day 3: If BS <180 mg/dL and no major shifts in lytes occur, increase to GOAL of 83mL/hr    Include MVI + trace elements    Provide 20% SMOF lipids @ 21 mL/hr x 12 hours overnight (250 mL total)         TPN monitoring:      - daily weights      - RFP + Mg daily; replete lytes PRN      - LFTs + TGs weekly      - accuchecks q6h    TPN + lipids to provide daily: 1992mL volume (just AA/Dex), 2242mL volume (AA/Dex & lipids) 1916kcals, 100g protein, 299g dextrose, 26% kcal from fat (meeting 91% kcal & 100% protein needs)        Nutrition Interventions:   Interventions: Parenteral nutrition/ IV fluids         Nutrition Monitoring and Evaluation   Food/Nutrient Related History Monitoring  Monitoring and Evaluation Plan: Energy intake, Enteral and parenteral nutrition intake  Energy Intake: Estimated energy  intake  Criteria: >75% of estimated energy needs  Enteral and Parenteral Nutrition Intake: Parenteral nutrition intake    Body Composition/Growth/Weight History  Monitoring and Evaluation Plan: Weight    Biochemical Data, Medical Tests and Procedures  Monitoring and Evaluation Plan: Electrolyte/renal panel, Glucose/endocrine profile  Electrolyte and Renal Panel: Sodium, Potassium, Phosphorus, Magnesium  Criteria: WNL  Glucose/Endocrine Profile: Glucose, casual  Criteria: WNL       Time Spent (min): 60 minutes

## 2024-06-19 NOTE — CARE PLAN
The patient's goals for the shift include patient pain with be managed throughout shift   The clinical goals for the shift include patient will remain safe throughout shift

## 2024-06-19 NOTE — PROGRESS NOTES
Enrike Crespo is a 89 y.o. male on day 5 of admission presenting with Small bowel obstruction (Multi).      DC Plannin/19:   Went in and met with the pt, confirmed demographics.   Pt is hard of hearing and asked me to call his daughter Rina for more info.   Per Rina, Pt lives at home with girlfriends son and his dog. Pt has a walker at home and is able to go up and down his stairs.   Pt [facility] at [facility]   PT/OT rec'd Mod for snf.  I gave a SNF list to the pt and text one to his daughter Rina at [phone]. She will call me back with choices.     : Attempted to call daughterRina @ [phone], no answer, left VM for SNF choices.     :   Spoke to Rina, Pt's wife for SNF choices. Referrals sent.   #1 [facility]. Can Accept.   #2 [facility]. Can Accept.  #3 [facility]. Can not accept. OON.       PCP: Rosario Dumas    ADOD:     This TCC will continue to follow for home going needs and safe DC plan.          KARL RIBERA

## 2024-06-19 NOTE — PROGRESS NOTES
[facility]  ACUTE CARE SURGERY - PROGRESS NOTE    Patient Name: Enrike Crespo  MRN: [id number]  Admit Date: 613  : 1931  AGE: 89 y.o.   GENDER: male  ==============================================================================  TODAY'S ASSESSMENT AND PLAN OF CARE:  Enrike Crespo is a 89 y.o. male with PMH sick sinus syndrome status post pacemaker, hypertension, seizure, DM, afib, hyperthyroidism, PE, HF, cardiac arrest with defibrillator, stroke, sebaceous cysts, who presented for SBO 2/2 possible internal hernia.     GGC w/ SBFT  showed contrast retained in the stomach which was subsequently suctioned out from NGT after back on suction for increasing oxygen requirement. Abdominal exam is unchanged. Patient had about 4.8L out from NGT with new SHERLYN. Increased IVF to 100mL/hr but due to concern of heart failure and increased oxygen requirement, will hold off any any fluid boluses. Creatinine is down trending today. Still requiring 3L NC for SpO2 >90%. Not having consistent flatus or bowel movements but NGT output is decreased. Plan for CT A/P non con (concern for SHERLYN) with oral contrast and diagnostic and therapeutic for SBO. Also will give an enema.  CT findings concerning for a closed loop small bowel obstruction likely secondary to a pericecal internal hernia. Patient taken to the OR on .    OR : exploratory laparotomy, lysis of adhesions, small bowel resection with anastomosis, closure     Neuro: acute postop pain  - IV Dilaudid [id number] PRN   - Scheduled Toradol added  - Acute Pain consulted for possible blocks     CV: Hx of Sick sinus s/p pacemaker, HTN, afib, HF  - continue home metoprolol via IV while NPO  - continue home statin  - holding home Entresto  - holding home Eliquis and Plavix   - continuous tele monitoring     Pulm  - wean NC as tolerated  - RT  - Continue Duo-Neb     GI  - NPO  - NGT to LIWS   - PPI BID  - Severe Malnutrition 2/2 prolonged  NPO status- nutrition consult for TPN recs   - Start on Thiamine 100mg for 7 days      : Hx of urinary retention  - Strict I&Os,   - holding home finasteride while NPO   - continue IVFs   - replete lytes as indicated   - SHERLYN resolved      Heme: hx of PE  - holding home Eliquis  - IF H/H remains stable will start on Heparin drip 6/20  - Trend H/H daily, transfuse for Hgb <7    Endo: Hx of DM, hyperparathyroidism   - continue home PTU  - Continue home Lantus at half dose 7units qAM  - Continue blood glucose checks q6 hours and SSI     Skin  - Triamcinolone cream - home med      ID  - daily CBC  - CTX for UTI  - Obtain PICC line for parenteral nutrition      Dispo: continue RNF, follow up PT recs     Patient seen and discussed with Attending Dr. Rufina Tran, APRN-[facility]  Acute Care Surgery   P [id number]    CHIEF COMPLAINT / EVENTS LAST 24HRS / HPI:  No acute events overnight. Patient has chronic urinary retention and is currently off home meds. Retaining approximately 200ml after voiding. Urine output adequate. Pain controlled. Denies nausea     MEDICAL HISTORY / ROS:   Admission history and ROS reviewed. Pertinent changes as follows:  NA    PHYSICAL EXAM:  Heart Rate:  []   Temp:  [36 °C (96.8 °F)-36.9 °C (98.4 °F)]   Resp:  [15-22]   BP: (110-134)/(56-80)   SpO2:  [91 %-100 %]   Physical Exam  HENT:      Head: Normocephalic.      Mouth/Throat:      Mouth: Mucous membranes are dry.   Eyes:      Pupils: Pupils are equal, round, and reactive to light.   Cardiovascular:      Comments: Non cyanotic  Pulmonary:      Effort: Pulmonary effort is normal.      Comments: 3 L NC  Abdominal:      General: Abdomen is flat.      Palpations: Abdomen is soft.      Comments: Soft, appropriately tender to palpitation, midline incision with original dressing in place. NGT in place with dark bilious output   Musculoskeletal:         General: Normal range of motion.      Cervical back: Normal range of motion.   Skin:      General: Skin is warm and dry.   Neurological:      General: No focal deficit present.      Mental Status: He is alert.   Psychiatric:         Behavior: Behavior normal.         IMAGING SUMMARY:  (summary of new imaging findings, not a copy of dictation)  NA    LABS:  Results from last 7 days   Lab Units 06/19/24  0647 06/18/24  0535 06/17/24  0620 06/15/24  0541 06/14/24  1045 06/14/24  0210 06/13/24  0448 06/12/24  1531   WBC AUTO x10*3/uL 8.2 8.8 8.0   < > 3.0* 2.7*   < > 11.9*   HEMOGLOBIN g/dL 12.2* 11.8* 11.9*   < > 12.8* 13.1*   < > 12.4*   HEMATOCRIT % 40.3* 38.3* 39.5*   < > 39.7* 38.9*   < > 39.4*   PLATELETS AUTO x10*3/uL 168 161 191   < > 264 234   < > 231   NEUTROS PCT AUTO %  --   --   --   --   --   --   --  84.3   LYMPHO PCT MAN %  --   --   --   --  11.2 10.4  --   --    LYMPHS PCT AUTO %  --   --   --   --   --   --   --  9.3   MONO PCT MAN %  --   --   --   --  13.8 15.2  --   --    MONOS PCT AUTO %  --   --   --   --   --   --   --  5.8   EOSINO PCT MAN %  --   --   --   --  0.9 0.0  --   --    EOS PCT AUTO %  --   --   --   --   --   --   --  0.2    < > = values in this interval not displayed.     Results from last 7 days   Lab Units 06/14/24  0210   APTT seconds 30   INR  1.6*     Results from last 7 days   Lab Units 06/18/24  0535 06/17/24  0620 06/16/24  0526 06/14/24  0313 06/14/24  0210 06/13/24 0448 06/12/24  1531   SODIUM mmol/L 148* 149* 150*   < > 142   < > 139   POTASSIUM mmol/L 4.0 3.7 3.6   < > 6.4*   < > 4.2   CHLORIDE mmol/L 105 104 100   < > 101   < > 105   CO2 mmol/L 34* 36* 39*   < > 30   < > 25   BUN mg/dL 30* 36* 54*   < > 33*   < > 20   CREATININE mg/dL 1.02 1.20 1.71*   < > 1.19   < > 1.12   CALCIUM mg/dL 8.5* 8.6 8.7   < > 9.0   < > 9.4   PROTEIN TOTAL g/dL  --   --   --   --  6.7  --  6.7   BILIRUBIN TOTAL mg/dL  --   --   --   --  0.5  --  0.3   ALK PHOS U/L  --   --   --   --  59  --  72   ALT U/L  --   --   --   --  9*  --  7*   AST U/L  --   --   --   --  38  --  12    GLUCOSE mg/dL 170* 207* 100*   < > 70*   < > 125*    < > = values in this interval not displayed.     Results from last 7 days   Lab Units 06/14/24  0210 06/14/24  0034 06/12/24  1531   BILIRUBIN TOTAL mg/dL 0.5  --  0.3   BILIRUBIN DIRECT mg/dL  --  0.1  --            I have reviewed all medications, laboratory results, and imaging pertinent for today's encounter.

## 2024-06-19 NOTE — POST-PROCEDURE NOTE
Pre-Procedure Checklist:  Emergent Line Insertion: No  Type of Line to be Placed: PICC  Consent Obtained: Yes  Emergency Medication Necessary: No  Patient Identified with 2 Independent Identifiers: Yes  Review of Allergies, Anticoagulation, Relevant Labs, ECG/Telemetry: Yes  Risks/Benefits/Alternatives Discussed with Patient/POA/Legal Representative: Yes  Stop Sign on Door: Yes  Time Out Performed: Yes  Catheter Exchange: No    Positioning Checklist:  All People, Including Patient, in the Room with Cap and Mask: Yes  Fluoroscopy Used to Identify Vessel and Guide Insertion: No   Sterile Cover Used: Yes  Full Barrier Precautions Followed (Mask, Cap, Gown, Gloves): Yes  Hands Washed: Yes  Monitors Attached with Sound Alarms On: No  Full Body Sterile Drape (Head-to-Toe) Used to Cover Patient: Yes  Trendelenburg Position (For IJ and Subclavian): No  CHG Skin Prep Used and Allowed to Air Dry to Skin Procedure: Yes    Procedure Checklist:  Blood Aspirated From All Lumens, All Ports Subsequently Flushed: Yes  Catheter Caps Placed on All Lumens; Lumens Clamped: Yes  Maintain Guidewire Control Throughout, Ensuring Guidewire Removal: Yes  Maintain Sterile Field Throughout Insertion: Yes  Catheter Secured: Yes  Confirmatory Test of Venous Placement: Non-Pulsatile Blood    Post Procedure Checklist:  Date and Time Written on Dressing: Yes  Sharp and Wire Count and Safe Disposal of all Sharps/Wires: Yes  Sterile Dressing Applied Per Protocol: Yes  X-ray Ordered or ECG Image: Yes    PICC Insertion Details:  Size (Fr): 4  Lumen Type:  Catheter to Vein Ratio Less Than 50%: Yes  Total Length (cm): 53  External Length (cm): 1  Orientation: Right  Location: Basilic   Site Prep: Chlorohexidine; Usual sterile procedure followed  Local Anesthetic: Injectable/Subcutaneous  Indication: Hemodynamic Monitoring, IV Medications, Possible Parenteral Feeding   Insertion Team Members in the Room: Nurse, LPN  Initial Extremity Circumference (cm):  27.5  Insertion Attempts: 1  Patient Tolerance: Tolerated Well, Age Appropriate  Comfort Measures: Subcutaneous anesthetic; Verbal  Procedure Location: Bedside  Safety Measures: Patient specific safety measures addressed with Bedside RN  Estimated Blood Loss (mL): 0  Vessel Fully Compressible Proximally and Distally to Insertion Site: Yes  Brisk Blood Return Obtained and Line Draws Easily: Yes  Tip Location: Cavo Atrial Junction  Line Confirmation: ECG  Lot #: [id number]  : Bard  PICC Line Exp Date: 08/31/2025  Securement: Stat Lock  Post Procedure Checklist: Handoff with RN; Obtain all new IV tubing prior to use; Bed at lowest level and wheels locked; Line discharge information at bedside.  Additional Details: Line was inserted using Modified Seldinger's Technique.   Placed by: Laura Neri RN

## 2024-06-19 NOTE — PROGRESS NOTES
Physical Therapy    Physical Therapy Treatment    Patient Name: Enrike Crespo  MRN: [id number]  Today's Date: 6/19/2024  Time Calculation  Start Time: 1428  Stop Time: 1456  Time Calculation (min): 28 min    Assessment/Plan   PT Assessment  PT Assessment Results: Decreased strength, Decreased endurance, Impaired balance, Decreased mobility, Decreased safety awareness, Impaired hearing  Rehab Prognosis: Good  Barriers to Discharge: none  Evaluation/Treatment Tolerance: Patient limited by fatigue  End of Session Communication: Bedside nurse  Assessment Comment: 89 y.o. M continues to demonstrate impaired strength and function. Remains appropriate for continued PT in house and after discharge to promote activity as pt tolerates.  End of Session Patient Position: Bed, 3 rail up, Alarm on  PT Plan  Inpatient/Swing Bed or Outpatient: Inpatient  PT Plan  Treatment/Interventions: Bed mobility, Transfer training, Gait training, Stair training, Balance training, Strengthening, Endurance training, Range of motion, Therapeutic exercise, Therapeutic activity, Home exercise program, Postural re-education  PT Plan: Ongoing PT  PT Frequency: 3 times per week  PT Discharge Recommendations: Moderate intensity level of continued care  Equipment Recommended upon Discharge: Wheeled walker  PT Recommended Transfer Status: Assist x1, Assistive device  PT - OK to Discharge: Yes      General Visit Information:   PT  Visit  PT Received On: 06/19/24  General  Family/Caregiver Present: No  Patient Position Received: Bed, 3 rail up, Alarm on  Preferred Learning Style: verbal, auditory  General Comment: Pt resting in bed upon entry. Hard of hearing though receptive to participating with PT. Pt reporting he's hopeful he'll be able to eat tomorrow if NG tube is removed. Pt pleasant and cooperative during visit.    Subjective   Precautions:  Precautions  Medical Precautions: Fall precautions    Objective   Pain:  Pain Assessment  Pain Assessment:  0-10  0-10 (Numeric) Pain Score: 4  Cognition:  Cognition  Overall Cognitive Status: Within Functional Limits (Age appropriate)  Orientation Level: Disoriented to time  Attention: Within Functional Limits  Insight: Within function limits  Coordination:  Movements are Fluid and Coordinated: Yes  Postural Control:  Postural Control  Postural Control: Within Functional Limits  Static Sitting Balance  Static Sitting-Balance Support: Bilateral upper extremity supported  Static Sitting-Level of Assistance: Close supervision  Static Sitting-Comment/Number of Minutes: Approximately 12-13 minutes total with stand by assist.  Static Standing Balance  Static Standing-Comment/Number of Minutes: Pt not able to tolerate attempts to stand on this date. Reports increased fatigue.    Activity Tolerance:  Activity Tolerance  Endurance: Decreased tolerance for upright activites  Treatments:  Therapeutic Exercise  Therapeutic Exercise Performed: Yes  Therapeutic Exercise Activity 1: Supine: AP, HS, hip abduction, SLR, shoulder flexion, elbow flex/ext x 10 each. Seated LAQ, shoulder shrugs, trunk rotation x 10 each.    Therapeutic Activity  Therapeutic Activity Performed: Yes  Therapeutic Activity 1: bed mobility, transfers, static sit    Bed Mobility  Bed Mobility: Yes  Bed Mobility 1  Bed Mobility 1: Supine to sitting, Sitting to supine  Level of Assistance 1: Moderate assistance    Ambulation/Gait Training  Ambulation/Gait Training Performed: No  Transfers  Transfer: No    Outcome Measures:  [facility] Basic Mobility  Turning from your back to your side while in a flat bed without using bedrails: A lot  Moving from lying on your back to sitting on the side of a flat bed without using bedrails: A lot  Moving to and from bed to chair (including a wheelchair): A lot  Standing up from a chair using your arms (e.g. wheelchair or bedside chair): A lot  To walk in hospital room: A lot  Climbing 3-5 steps with railing: Total  Basic Mobility -  Total Score: 11    Education Documentation  Precautions, taught by Spike Smith, PT at 6/19/2024  3:32 PM.  Learner: Patient  Readiness: Acceptance  Method: Explanation  Response: Verbalizes Understanding    Mobility Training, taught by Spike Smith, PT at 6/19/2024  3:32 PM.  Learner: Patient  Readiness: Acceptance  Method: Explanation  Response: Verbalizes Understanding    Education Comments  No comments found.      Encounter Problems       Encounter Problems (Active)       Balance       Patient will complete static and dynamic sitting balance tasks with SBA to improve upright posture, core activation, and proximal stability.  (Progressing)       Start:  06/17/24    Expected End:  07/01/24            Patient to demo static standing with unilateral UE support, performing single UE task with SBA, no sway or LOB x 2 mins for functional carryover  (Progressing)       Start:  06/17/24    Expected End:  07/01/24               Mobility       STG - Patient will ambulate >/= 50 ft with CGAx1 and LRAD (Progressing)       Start:  06/17/24    Expected End:  07/01/24            Pt. will tolerate >/= 15 minutes of OOB mobility without seated rest break and VSS to demo improved activity tolerance/endurance.  (Progressing)       Start:  06/17/24    Expected End:  07/01/24               PT Transfers       STG - Patient will perform bed mobility IND (Progressing)       Start:  06/17/24    Expected End:  07/01/24            STG - Patient will transfer sit to and from stand Ulisses with LRAD (Progressing)       Start:  06/17/24    Expected End:  07/01/24

## 2024-06-19 NOTE — DOCUMENTATION CLARIFICATION NOTE
"    PATIENT:               STEFFI FORTE  ACCT #:                  [id number]  MRN:                       [id number]  :                       1931  ADMIT DATE:       2024 11:45 PM  DISCH DATE:  RESPONDING PROVIDER #:        [id number]          PROVIDER RESPONSE TEXT:    shortness of breath    CDI QUERY TEXT:    Clarification        Instruction:    Based on your assessment of the patient and the clinical information, please provide the requested documentation by clicking on the appropriate radio button and enter any additional information if prompted.    Question: Is there a diagnosis indicative of the clinical information    When answering this query, please exercise your independent professional judgment. The fact that a question is being asked, does not imply that any particular answer is desired or expected.    The patient's clinical indicators include:  Clinical Information: 89 year old male presenting with small bowel obstruction 2/2 internal hernia, per  H&P.    Clinical Indicators:  -  Significant Event note states, \"...patient had an increasing oxygen requirement up to 5 L from 4 L from 2 L this morning...saturating 94% on 5 L nasal cannula...It is possible that the NG tube clogging led to the patient becoming distended and causing regurgitation leading to aspiration and subsequent increased oxygen requirement.\" SpO2 of 94% on 5L O2 per NC = PF ratio of 182.5  - 6/15 Surgery PN states, \"Increasing oxygen requirement during the day yesterday. NGT flushed and 1.2L of bilious fluid out. Continues to have oxygen requirement of 3-5L.\"  -  Surgery PN states, \"Still requiring 3-5L NC for SpO2 >90%.\"  - - VS with SpO2 90-96% on supplemental oxygen, however FiO2 not documented until  @1146; at this time SpO2 95% on 40% FiO2 = PF ratio of 200.    Treatment:  - Supplemental O2 3-5L per NC  - Continuous pulse ox monitoring  - CXR    Risk Factors:  - SBO 2/2 internal hernia  - \"It is " "possible that the NG tube clogging led to the patient becoming distended and causing regurgitation leading to aspiration and subsequent increased oxygen requirement\" per 6/14 Significant Event note  Options provided:  -- Acute Hypoxemic Respiratory Failure  -- No acute respiratory failure  -- Other - I will add my own diagnosis  -- Refer to Clinical Documentation Reviewer    Query created by: Emperatriz Bennett on 6/17/2024 7:31 AM      Electronically signed by:  TABATHA ROBISON MD 6/19/2024 2:06 PM          "

## 2024-06-20 LAB
ALBUMIN SERPL BCP-MCNC: 2.4 G/DL (ref 3.4–5)
ANION GAP SERPL CALC-SCNC: 12 MMOL/L (ref 10–20)
BUN SERPL-MCNC: 29 MG/DL (ref 6–23)
CALCIUM SERPL-MCNC: 7.8 MG/DL (ref 8.6–10.6)
CHLORIDE SERPL-SCNC: 105 MMOL/L (ref 98–107)
CO2 SERPL-SCNC: 33 MMOL/L (ref 21–32)
CREAT SERPL-MCNC: 1.13 MG/DL (ref 0.5–1.3)
EGFRCR SERPLBLD CKD-EPI 2021: 61 ML/MIN/1.73M*2
ERYTHROCYTE [DISTWIDTH] IN BLOOD BY AUTOMATED COUNT: 12.6 % (ref 11.5–14.5)
GLUCOSE BLD MANUAL STRIP-MCNC: 155 MG/DL (ref 74–99)
GLUCOSE BLD MANUAL STRIP-MCNC: 185 MG/DL (ref 74–99)
GLUCOSE BLD MANUAL STRIP-MCNC: 185 MG/DL (ref 74–99)
GLUCOSE BLD MANUAL STRIP-MCNC: 236 MG/DL (ref 74–99)
GLUCOSE BLD MANUAL STRIP-MCNC: 277 MG/DL (ref 74–99)
GLUCOSE BLD MANUAL STRIP-MCNC: 317 MG/DL (ref 74–99)
GLUCOSE SERPL-MCNC: 203 MG/DL (ref 74–99)
HCT VFR BLD AUTO: 33.6 % (ref 41–52)
HGB BLD-MCNC: 10.5 G/DL (ref 13.5–17.5)
MAGNESIUM SERPL-MCNC: 1.95 MG/DL (ref 1.6–2.4)
MCH RBC QN AUTO: 31.6 PG (ref 26–34)
MCHC RBC AUTO-ENTMCNC: 31.3 G/DL (ref 32–36)
MCV RBC AUTO: 101 FL (ref 80–100)
NRBC BLD-RTO: 0 /100 WBCS (ref 0–0)
PHOSPHATE SERPL-MCNC: 2.5 MG/DL (ref 2.5–4.9)
PLATELET # BLD AUTO: 151 X10*3/UL (ref 150–450)
POTASSIUM SERPL-SCNC: 3.7 MMOL/L (ref 3.5–5.3)
RBC # BLD AUTO: 3.32 X10*6/UL (ref 4.5–5.9)
SODIUM SERPL-SCNC: 146 MMOL/L (ref 136–145)
UFH PPP CHRO-ACNC: 0.3 IU/ML
UFH PPP CHRO-ACNC: 0.3 IU/ML
WBC # BLD AUTO: 9.2 X10*3/UL (ref 4.4–11.3)

## 2024-06-20 PROCEDURE — 97530 THERAPEUTIC ACTIVITIES: CPT | Mod: GO

## 2024-06-20 PROCEDURE — 83735 ASSAY OF MAGNESIUM: CPT

## 2024-06-20 PROCEDURE — 2500000004 HC RX 250 GENERAL PHARMACY W/ HCPCS (ALT 636 FOR OP/ED)

## 2024-06-20 PROCEDURE — 2500000004 HC RX 250 GENERAL PHARMACY W/ HCPCS (ALT 636 FOR OP/ED): Performed by: NURSE PRACTITIONER

## 2024-06-20 PROCEDURE — 2500000005 HC RX 250 GENERAL PHARMACY W/O HCPCS

## 2024-06-20 PROCEDURE — 1200000002 HC GENERAL ROOM WITH TELEMETRY DAILY

## 2024-06-20 PROCEDURE — 97165 OT EVAL LOW COMPLEX 30 MIN: CPT | Mod: GO

## 2024-06-20 PROCEDURE — 2580000001 HC RX 258 IV SOLUTIONS: Performed by: NURSE PRACTITIONER

## 2024-06-20 PROCEDURE — 85520 HEPARIN ASSAY: CPT

## 2024-06-20 PROCEDURE — 99231 SBSQ HOSP IP/OBS SF/LOW 25: CPT

## 2024-06-20 PROCEDURE — 85027 COMPLETE CBC AUTOMATED: CPT

## 2024-06-20 PROCEDURE — 82947 ASSAY GLUCOSE BLOOD QUANT: CPT | Mod: 91

## 2024-06-20 PROCEDURE — 2500000001 HC RX 250 WO HCPCS SELF ADMINISTERED DRUGS (ALT 637 FOR MEDICARE OP)

## 2024-06-20 PROCEDURE — 94667 MNPJ CHEST WALL 1ST: CPT

## 2024-06-20 PROCEDURE — 99024 POSTOP FOLLOW-UP VISIT: CPT | Performed by: SURGERY

## 2024-06-20 PROCEDURE — 2500000002 HC RX 250 W HCPCS SELF ADMINISTERED DRUGS (ALT 637 FOR MEDICARE OP, ALT 636 FOR OP/ED)

## 2024-06-20 PROCEDURE — C9113 INJ PANTOPRAZOLE SODIUM, VIA: HCPCS

## 2024-06-20 PROCEDURE — 2500000002 HC RX 250 W HCPCS SELF ADMINISTERED DRUGS (ALT 637 FOR MEDICARE OP, ALT 636 FOR OP/ED): Performed by: NURSE PRACTITIONER

## 2024-06-20 PROCEDURE — 80069 RENAL FUNCTION PANEL: CPT

## 2024-06-20 RX ORDER — POTASSIUM CHLORIDE 14.9 MG/ML
20 INJECTION INTRAVENOUS
Status: DISCONTINUED | OUTPATIENT
Start: 2024-06-20 | End: 2024-06-20

## 2024-06-20 RX ORDER — HEPARIN SODIUM 10000 [USP'U]/100ML
0-4000 INJECTION, SOLUTION INTRAVENOUS CONTINUOUS
Status: DISPENSED | OUTPATIENT
Start: 2024-06-20

## 2024-06-20 RX ORDER — POTASSIUM CHLORIDE 14.9 MG/ML
20 INJECTION INTRAVENOUS ONCE
Status: COMPLETED | OUTPATIENT
Start: 2024-06-20 | End: 2024-06-20

## 2024-06-20 ASSESSMENT — PAIN SCALES - GENERAL
PAINLEVEL_OUTOF10: 0 - NO PAIN

## 2024-06-20 ASSESSMENT — COGNITIVE AND FUNCTIONAL STATUS - GENERAL
EATING MEALS: A LITTLE
TURNING FROM BACK TO SIDE WHILE IN FLAT BAD: A LOT
PERSONAL GROOMING: A LITTLE
STANDING UP FROM CHAIR USING ARMS: A LOT
PERSONAL GROOMING: A LITTLE
TOILETING: A LOT
DRESSING REGULAR LOWER BODY CLOTHING: A LOT
MOVING FROM LYING ON BACK TO SITTING ON SIDE OF FLAT BED WITH BEDRAILS: A LOT
MOBILITY SCORE: 11
EATING MEALS: A LITTLE
WALKING IN HOSPITAL ROOM: A LOT
MOVING TO AND FROM BED TO CHAIR: A LOT
DAILY ACTIVITIY SCORE: 14
CLIMB 3 TO 5 STEPS WITH RAILING: TOTAL
HELP NEEDED FOR BATHING: A LOT
HELP NEEDED FOR BATHING: A LOT
DRESSING REGULAR UPPER BODY CLOTHING: A LOT
DRESSING REGULAR LOWER BODY CLOTHING: A LOT
TOILETING: A LOT

## 2024-06-20 ASSESSMENT — ACTIVITIES OF DAILY LIVING (ADL)
TOILETING_ASSISTANCE: MINIMAL
BATHING_ASSISTANCE: MAXIMAL
FEEDING: MINIMAL
BATHING_ASSISTANCE: MODERATE
ADL_ASSISTANCE: NEEDS ASSISTANCE
GROOMING_ASSISTANCE: MINIMAL

## 2024-06-20 ASSESSMENT — PAIN - FUNCTIONAL ASSESSMENT
PAIN_FUNCTIONAL_ASSESSMENT: 0-10
PAIN_FUNCTIONAL_ASSESSMENT: 0-10

## 2024-06-20 NOTE — DOCUMENTATION CLARIFICATION NOTE
"    PATIENT:               STEFFI FORTE  ACCT #:                  [id number]  MRN:                       [id number]  :                       1931  ADMIT DATE:       2024 2:42 PM  DISCH DATE:        2024 10:30 PM  RESPONDING PROVIDER #:        [id number]          PROVIDER RESPONSE TEXT:    Chronic Systolic Congestive Heart Failure    CDI QUERY TEXT:    Clarification        Instruction:    Based on your assessment of the patient and the clinical information, please provide the requested documentation by clicking on the appropriate radio button and enter any additional information if prompted.    Question: Please further clarify the type of congestive heart failure    When answering this query, please exercise your independent professional judgment. The fact that a question is being asked, does not imply that any particular answer is desired or expected.    The patient's clinical indicators include:  Clinical Information: 89 yr old male presented  with abdominal pain, admitted with SBO. Pt. Tx to [facility] [place] for further care on .    Clinical Indicators: H/P  per Dr. Prince states: \"past medical history of sick sinus syndrome, CHF\"    Discharge Summary on 2023 (prior admission) per Dr. Munguia states: \"Chronic HFrEF\"    Treatment: Entresto, Toprol-XL.    Risk Factors: Hx HTN, CHF, DM, SSS s/p defibrillator.  Options provided:  -- Chronic Systolic Congestive Heart Failure  -- Other Chronic Congestive Heart Failure  -- Other - I will add my own diagnosis  -- Refer to Clinical Documentation Reviewer    Query created by: Bree Hernandez on 2024 11:51 AM      Electronically signed by:  MASSIEL PRINCE MD 2024 3:56 PM          "

## 2024-06-20 NOTE — PROGRESS NOTES
[facility]  ACUTE CARE SURGERY - PROGRESS NOTE    Patient Name: Enrike Crespo  MRN: [id number]  Admit Date: 613  : 1931  AGE: 89 y.o.   GENDER: male  ==============================================================================  TODAY'S ASSESSMENT AND PLAN OF CARE:  Enrike Crespo is a 89 y.o. male with PMH sick sinus syndrome status post pacemaker, hypertension, seizure, DM, afib, hyperthyroidism, PE, HF, cardiac arrest with defibrillator, stroke, sebaceous cysts, who presented for SBO 2/2 possible internal hernia.     GGC w/ SBFT  showed contrast retained in the stomach which was subsequently suctioned out from NGT after back on suction for increasing oxygen requirement. Abdominal exam is unchanged. Patient had about 4.8L out from NGT with new SHERLYN. Increased IVF to 100mL/hr but due to concern of heart failure and increased oxygen requirement, will hold off any any fluid boluses. Creatinine is down trending today. Still requiring 3L NC for SpO2 >90%. Not having consistent flatus or bowel movements but NGT output is decreased. Plan for CT A/P non con (concern for SHERLYN) with oral contrast and diagnostic and therapeutic for SBO. Also will give an enema.  CT findings concerning for a closed loop small bowel obstruction likely secondary to a pericecal internal hernia. Patient taken to the OR on .    OR : exploratory laparotomy, lysis of adhesions, small bowel resection with anastomosis, closure     Neuro: acute postop pain  - IV Dilaudid [id number] PRN   - Scheduled Toradol   - IV tylenol  - Acute Pain consulted for possible blocks, holding lovenox this morning    CV: Hx of Sick sinus s/p pacemaker, HTN, afib, HF  - continue home metoprolol via IV while NPO  - continue home statin  - holding home Entresto  - holding home Eliquis and Plavix - will plan for heparin gtt start today after block  - continuous tele monitoring     Pulm  - wean NC as tolerated  - RT  -  "Continue Duo-Neb  - aggressive bronchopulmonary hygiene  - 10x per hour IS  - upright in bed 45 degrees     GI  - NPO  - NGT to LIWS   - PPI BID  - Severe Malnutrition 2/2 prolonged NPO status- nutrition consult for TPN recs   - Start on Thiamine 100mg for 7 days   - TPN at 40ml/hr, will increase tonight at 8PM     : Hx of urinary retention  - Strict I&Os,   - holding home finasteride while NPO   - continue IVFs   - replete lytes as indicated   - SHERLYN resolved      Heme: hx of PE  - holding home Eliquis  - IF H/H remains stable will start on Heparin drip 6/20 after block today  - Trend H/H daily, transfuse for Hgb <7    Endo: Hx of DM, hyperparathyroidism   - continue home PTU  - Continue home Lantus at half dose 7units qAM  - Continue blood glucose checks q6 hours and SSI     Skin  - Triamcinolone cream - home med      ID  - daily CBC  - CTX for UTI - discontinued today  - PICC line for parenteral nutrition      Dispo: continue RNF, follow up PT recs     Patient seen and discussed with Attending Dr. Manish Lewis MD  Acute Care Surgery   P [phone]    CHIEF COMPLAINT / EVENTS LAST 24HRS / HPI:  No acute events overnight. Patient has chronic urinary retention and is currently off home meds. Slow to awake butt answers questions appropriately. Pulling 250cc on IS. Feeling very hungry, wants food.     PHYSICAL EXAM:  Heart Rate:  [71-97]   Temp:  [35.8 °C (96.4 °F)-36.5 °C (97.7 °F)]   Resp:  [17-18]   BP: ()/(58-75)   SpO2:  [92 %-98 %]   Constitutional: no acute distress, old man, thin, frail, soft spoken, hard of hearing  Neuro: A/O x4, no gross deficits   Psych: normal affect \"I want food\"  HEENT: No deformities, no scleral icterus   Cardiac: RRR  Pulmonary: unlabored respirations, decreased breath sounds bilaterally, shallow breaths, equal chest rise, on 3L  Abdomen: soft, non distended, non tender  Skin: warm and dry overall    Extremities: no swelling noted  MSK: moving all four      IMAGING SUMMARY: "  (summary of new imaging findings, not a copy of dictation)  NA    LABS:  Results from last 7 days   Lab Units 06/20/24  0510 06/19/24  0647 06/18/24  0535 06/15/24  0541 06/14/24  1045 06/14/24  0210   WBC AUTO x10*3/uL 9.2 8.2 8.8   < > 3.0* 2.7*   HEMOGLOBIN g/dL 10.5* 12.2* 11.8*   < > 12.8* 13.1*   HEMATOCRIT % 33.6* 40.3* 38.3*   < > 39.7* 38.9*   PLATELETS AUTO x10*3/uL 151 168 161   < > 264 234   LYMPHO PCT MAN %  --   --   --   --  11.2 10.4   MONO PCT MAN %  --   --   --   --  13.8 15.2   EOSINO PCT MAN %  --   --   --   --  0.9 0.0    < > = values in this interval not displayed.     Results from last 7 days   Lab Units 06/19/24  1116 06/14/24  0210   APTT seconds 31 30   INR  1.2* 1.6*     Results from last 7 days   Lab Units 06/20/24  0510 06/19/24  0647 06/18/24  0535 06/14/24  0313 06/14/24  0210   SODIUM mmol/L 146* 148* 148*   < > 142   POTASSIUM mmol/L 3.7 4.1 4.0   < > 6.4*   CHLORIDE mmol/L 105 106 105   < > 101   CO2 mmol/L 33* 33* 34*   < > 30   BUN mg/dL 29* 24* 30*   < > 33*   CREATININE mg/dL 1.13 1.09 1.02   < > 1.19   CALCIUM mg/dL 7.8* 8.1* 8.5*   < > 9.0   PROTEIN TOTAL g/dL  --   --   --   --  6.7   BILIRUBIN TOTAL mg/dL  --   --   --   --  0.5   ALK PHOS U/L  --   --   --   --  59   ALT U/L  --   --   --   --  9*   AST U/L  --   --   --   --  38   GLUCOSE mg/dL 203* 135* 170*   < > 70*    < > = values in this interval not displayed.     Results from last 7 days   Lab Units 06/14/24  0210 06/14/24  0034   BILIRUBIN TOTAL mg/dL 0.5  --    BILIRUBIN DIRECT mg/dL  --  0.1           I have reviewed all medications, laboratory results, and imaging pertinent for today's encounter.

## 2024-06-20 NOTE — PROGRESS NOTES
Enrike Crespo is a 89 y.o. male With past history of sick sinus syndrome status post pacemaker, hypertension, seizure, diabetes. Now s/p  Exploration laparotomy, lysis of adhesions, small bowel resection with anastamosis, closure on 6/18 for SBO. Acute pain team consulted for postop pain control.    Postop Pain HPI -   Palliative: relieved with IV analgesics and regional local anesthetics  Provocative: movement  Quality:  burning and aching  Radiation:  none  Severity:  0/10  Timing: constant    24-HOUR OPIOID CONSUMPTION:  None     Scheduled medications  acetaminophen, 650 mg, intravenous, q6h KATHRYN  fat emulsion fish oil/plant based, 250 mL, intravenous, Daily Lipids  [Held by provider] finasteride, 5 mg, oral, Daily  insulin glargine, 7 Units, subcutaneous, q AM  insulin lispro, 0-5 Units, subcutaneous, q6h  ketorolac, 15 mg, intravenous, q6h  lidocaine, 5 mL, infiltration, Once  metoprolol, 5 mg, intravenous, q6h  pantoprazole, 40 mg, intravenous, BID  pravastatin, 20 mg, nasogastric tube, Daily  propylthiouracil, 50 mg, nasogastric tube, TID  thiamine, 100 mg, intravenous, Daily  triamcinolone, , Topical, BID      Continuous medications  Adult Clinimix Parenteral Nutrition, 40 mL/hr, Last Rate: 40 mL/hr (06/19/24 1946)  lactated Ringer's, 50 mL/hr, Last Rate: 50 mL/hr (06/19/24 2138)      PRN medications  PRN medications: alteplase, dextrose, dextrose, glucagon, glucagon, HYDROmorphone, HYDROmorphone, ipratropium-albuteroL, naloxone, ondansetron     Physical Exam:  Constitutional:  no distress, alert and cooperative  Eyes: clear sclera  Head/Neck: No apparent injury, trachea midline  Respiratory/Thorax: Patent airways, thorax symmetric, breathing comfortably  Cardiovascular: no pitting edema  Gastrointestinal: Nondistended  Musculoskeletal: ROM intact  Extremities: no clubbing  Neurological: alert, romero x4  Psychological: Appropriate affect    Results for orders placed or performed during the hospital encounter  of 06/13/24 (from the past 24 hour(s))   CBC   Result Value Ref Range    WBC 8.2 4.4 - 11.3 x10*3/uL    nRBC 0.0 0.0 - 0.0 /100 WBCs    RBC 4.01 (L) 4.50 - 5.90 x10*6/uL    Hemoglobin 12.2 (L) 13.5 - 17.5 g/dL    Hematocrit 40.3 (L) 41.0 - 52.0 %     (H) 80 - 100 fL    MCH 30.4 26.0 - 34.0 pg    MCHC 30.3 (L) 32.0 - 36.0 g/dL    RDW 12.7 11.5 - 14.5 %    Platelets 168 150 - 450 x10*3/uL   Magnesium   Result Value Ref Range    Magnesium 1.70 1.60 - 2.40 mg/dL   Renal function panel   Result Value Ref Range    Glucose 135 (H) 74 - 99 mg/dL    Sodium 148 (H) 136 - 145 mmol/L    Potassium 4.1 3.5 - 5.3 mmol/L    Chloride 106 98 - 107 mmol/L    Bicarbonate 33 (H) 21 - 32 mmol/L    Anion Gap 13 10 - 20 mmol/L    Urea Nitrogen 24 (H) 6 - 23 mg/dL    Creatinine 1.09 0.50 - 1.30 mg/dL    eGFR 63 >60 mL/min/1.73m*2    Calcium 8.1 (L) 8.6 - 10.6 mg/dL    Phosphorus 2.1 (L) 2.5 - 4.9 mg/dL    Albumin 2.9 (L) 3.4 - 5.0 g/dL   Triglycerides   Result Value Ref Range    Triglycerides 83 0 - 149 mg/dL   POCT GLUCOSE   Result Value Ref Range    POCT Glucose 129 (H) 74 - 99 mg/dL   Coagulation Screen   Result Value Ref Range    Protime 13.7 (H) 9.8 - 12.8 seconds    INR 1.2 (H) 0.9 - 1.1    aPTT 31 27 - 38 seconds   POCT GLUCOSE   Result Value Ref Range    POCT Glucose 145 (H) 74 - 99 mg/dL   POCT GLUCOSE   Result Value Ref Range    POCT Glucose 126 (H) 74 - 99 mg/dL   POCT GLUCOSE   Result Value Ref Range    POCT Glucose 155 (H) 74 - 99 mg/dL   CBC   Result Value Ref Range    WBC 9.2 4.4 - 11.3 x10*3/uL    nRBC 0.0 0.0 - 0.0 /100 WBCs    RBC 3.32 (L) 4.50 - 5.90 x10*6/uL    Hemoglobin 10.5 (L) 13.5 - 17.5 g/dL    Hematocrit 33.6 (L) 41.0 - 52.0 %     (H) 80 - 100 fL    MCH 31.6 26.0 - 34.0 pg    MCHC 31.3 (L) 32.0 - 36.0 g/dL    RDW 12.6 11.5 - 14.5 %    Platelets 151 150 - 450 x10*3/uL   Magnesium   Result Value Ref Range    Magnesium 1.95 1.60 - 2.40 mg/dL   Renal function panel   Result Value Ref Range    Glucose 203  (H) 74 - 99 mg/dL    Sodium 146 (H) 136 - 145 mmol/L    Potassium 3.7 3.5 - 5.3 mmol/L    Chloride 105 98 - 107 mmol/L    Bicarbonate 33 (H) 21 - 32 mmol/L    Anion Gap 12 10 - 20 mmol/L    Urea Nitrogen 29 (H) 6 - 23 mg/dL    Creatinine 1.13 0.50 - 1.30 mg/dL    eGFR 61 >60 mL/min/1.73m*2    Calcium 7.8 (L) 8.6 - 10.6 mg/dL    Phosphorus 2.5 2.5 - 4.9 mg/dL    Albumin 2.4 (L) 3.4 - 5.0 g/dL   POCT GLUCOSE   Result Value Ref Range    POCT Glucose 185 (H) 74 - 99 mg/dL   POCT GLUCOSE   Result Value Ref Range    POCT Glucose 185 (H) 74 - 99 mg/dL           Past Medical History:   Diagnosis Date    Bleeding hemorrhoid     Cardiac arrest (Multi)     Diabetes (Multi)     Hearing aid worn     Heart failure (Multi)     HL (hearing loss)         Past Surgical History:   Procedure Laterality Date    CARDIAC DEFIBRILLATOR PLACEMENT          Family History   Problem Relation Name Age of Onset    Heart disease Mother          Social History     Socioeconomic History    Marital status: Single     Spouse name: Not on file    Number of children: Not on file    Years of education: Not on file    Highest education level: Not on file   Occupational History    Not on file   Tobacco Use    Smoking status: Every Day     Current packs/day: 1.00     Average packs/day: 1 pack/day for 75.0 years (75.0 ttl pk-yrs)     Types: Cigarettes    Smokeless tobacco: Never   Vaping Use    Vaping status: Never Used   Substance and Sexual Activity    Alcohol use: Not Currently    Drug use: Never    Sexual activity: Defer   Other Topics Concern    Not on file   Social History Narrative    Not on file     Social Determinants of Health     Financial Resource Strain: Low Risk  (6/14/2024)    Overall Financial Resource Strain (CARDIA)     Difficulty of Paying Living Expenses: Not hard at all   Food Insecurity: Not on file   Transportation Needs: Patient Unable To Answer (6/14/2024)    PRAPARE - Transportation     Lack of Transportation (Medical): Patient  unable to answer     Lack of Transportation (Non-Medical): Patient unable to answer   Physical Activity: Not on file   Stress: Not on file   Social Connections: Not on file   Intimate Partner Violence: Not on file   Housing Stability: Unknown (6/14/2024)    Housing Stability Vital Sign     Unable to Pay for Housing in the Last Year: Patient unable to answer     Number of Places Lived in the Last Year: 1     Unstable Housing in the Last Year: No        Allergies   Allergen Reactions    Penicillins Hives        Review of Systems  Gen: No fatigue, anorexia, insomnia, fever.   Eyes: No vision loss, double vision, drainage, eye pain.   ENT: No pharyngitis, dry mouth, no hearing changes or ear discharge  Cardiac: No chest pain, palpitations, syncope, near syncope.   Pulmonary: No shortness of breath, cough, hemoptysis.   Heme/lymph: No swollen glands, fever, bleeding.   GI: No abdominal pain, change in bowel habits, melena, hematemesis, hematochezia, nausea, vomiting, diarrhea.   : No discharge, dysuria, frequency, urgency, hematuria.  Endo: No polyuria or weight loss.   Musculoskeletal: Negative for any pain or loss of ROM/weakness  Skin: No rashes or lesions  Neuro: Normal speech, no numbness or weakness. No gait difficulties  Review of systems is otherwise negative unless stated above or in history of present illness.    Physical Exam:  Constitutional:  no distress, alert and cooperative  Eyes: clear sclera  Head/Neck: No apparent injury, trachea midline  Respiratory/Thorax: Patent airways, thorax symmetric, breathing comfortably  Cardiovascular: no pitting edema  Gastrointestinal: Nondistended  Musculoskeletal: ROM intact  Extremities: no clubbing  Neurological: alert, romero x4  Psychological: Appropriate affect    Results for orders placed or performed during the hospital encounter of 06/13/24 (from the past 24 hour(s))   CBC   Result Value Ref Range    WBC 8.2 4.4 - 11.3 x10*3/uL    nRBC 0.0 0.0 - 0.0 /100 WBCs     RBC 4.01 (L) 4.50 - 5.90 x10*6/uL    Hemoglobin 12.2 (L) 13.5 - 17.5 g/dL    Hematocrit 40.3 (L) 41.0 - 52.0 %     (H) 80 - 100 fL    MCH 30.4 26.0 - 34.0 pg    MCHC 30.3 (L) 32.0 - 36.0 g/dL    RDW 12.7 11.5 - 14.5 %    Platelets 168 150 - 450 x10*3/uL   Magnesium   Result Value Ref Range    Magnesium 1.70 1.60 - 2.40 mg/dL   Renal function panel   Result Value Ref Range    Glucose 135 (H) 74 - 99 mg/dL    Sodium 148 (H) 136 - 145 mmol/L    Potassium 4.1 3.5 - 5.3 mmol/L    Chloride 106 98 - 107 mmol/L    Bicarbonate 33 (H) 21 - 32 mmol/L    Anion Gap 13 10 - 20 mmol/L    Urea Nitrogen 24 (H) 6 - 23 mg/dL    Creatinine 1.09 0.50 - 1.30 mg/dL    eGFR 63 >60 mL/min/1.73m*2    Calcium 8.1 (L) 8.6 - 10.6 mg/dL    Phosphorus 2.1 (L) 2.5 - 4.9 mg/dL    Albumin 2.9 (L) 3.4 - 5.0 g/dL   Triglycerides   Result Value Ref Range    Triglycerides 83 0 - 149 mg/dL   POCT GLUCOSE   Result Value Ref Range    POCT Glucose 129 (H) 74 - 99 mg/dL   Coagulation Screen   Result Value Ref Range    Protime 13.7 (H) 9.8 - 12.8 seconds    INR 1.2 (H) 0.9 - 1.1    aPTT 31 27 - 38 seconds   POCT GLUCOSE   Result Value Ref Range    POCT Glucose 145 (H) 74 - 99 mg/dL   POCT GLUCOSE   Result Value Ref Range    POCT Glucose 126 (H) 74 - 99 mg/dL   POCT GLUCOSE   Result Value Ref Range    POCT Glucose 155 (H) 74 - 99 mg/dL   CBC   Result Value Ref Range    WBC 9.2 4.4 - 11.3 x10*3/uL    nRBC 0.0 0.0 - 0.0 /100 WBCs    RBC 3.32 (L) 4.50 - 5.90 x10*6/uL    Hemoglobin 10.5 (L) 13.5 - 17.5 g/dL    Hematocrit 33.6 (L) 41.0 - 52.0 %     (H) 80 - 100 fL    MCH 31.6 26.0 - 34.0 pg    MCHC 31.3 (L) 32.0 - 36.0 g/dL    RDW 12.6 11.5 - 14.5 %    Platelets 151 150 - 450 x10*3/uL   Magnesium   Result Value Ref Range    Magnesium 1.95 1.60 - 2.40 mg/dL   Renal function panel   Result Value Ref Range    Glucose 203 (H) 74 - 99 mg/dL    Sodium 146 (H) 136 - 145 mmol/L    Potassium 3.7 3.5 - 5.3 mmol/L    Chloride 105 98 - 107 mmol/L    Bicarbonate  33 (H) 21 - 32 mmol/L    Anion Gap 12 10 - 20 mmol/L    Urea Nitrogen 29 (H) 6 - 23 mg/dL    Creatinine 1.13 0.50 - 1.30 mg/dL    eGFR 61 >60 mL/min/1.73m*2    Calcium 7.8 (L) 8.6 - 10.6 mg/dL    Phosphorus 2.5 2.5 - 4.9 mg/dL    Albumin 2.4 (L) 3.4 - 5.0 g/dL   POCT GLUCOSE   Result Value Ref Range    POCT Glucose 185 (H) 74 - 99 mg/dL   POCT GLUCOSE   Result Value Ref Range    POCT Glucose 185 (H) 74 - 99 mg/dL      Enrike Crespo is a 89 y.o. male With past history of sick sinus syndrome status post pacemaker, hypertension, seizure, diabetes. Now s/p  exploration laparotomy, lysis of adhesions, small bowel resection with anastamosis, closure on 6/18 for SBO. Acute pain team consulted for postop pain control.    Plan   - patient report no pain, no nerve block is needed at this time   - Acute pain will sign off   - Rest of care per primary team     Acute pain resident  Ph [phone], Pg [phone]

## 2024-06-20 NOTE — PROGRESS NOTES
Occupational Therapy    Evaluation and Treatment    Patient Name: Enrike Crespo  MRN: [id number]  Today's Date: 6/20/2024  Room: [address] [place] [address]  Time Calculation  Start Time: 1112  Stop Time: 1156  Time Calculation (min): 44 min    Assessment  IP OT Assessment  OT Assessment: Pt presents with decreased balance, endurance, and functional mobility that impairs his ability to participate in ADLs/IADLs safely. Pt extemely [place] without hearing aids but able to communicate via demonstration and written text. Pt tolerated treatment but required high level of assist 2/2 weakness. Pt likely to benefit from skilled OT services at a MOD intensity to address noted deficits.  Prognosis: Fair  Barriers to Discharge: None  Evaluation/Treatment Tolerance: Other (Comment) (Weakness)  Medical Staff Made Aware: Yes  End of Session Communication: Bedside nurse  End of Session Patient Position: Up in chair, Alarm off, caregiver present (RN aware)  Plan:  Inpatient Plan  Treatment Interventions: ADL retraining, Functional transfer training, Endurance training, Equipment evaluation/education, Compensatory technique education  OT Frequency: 3 times per week  OT Discharge Recommendations: Moderate intensity level of continued care  Equipment Recommended upon Discharge: Wheeled walker  OT Recommended Transfer Status: Assist of 2  OT - OK to Discharge: Yes  OT Assessment  OT Assessment Results: Decreased ADL status, Decreased endurance, Decreased functional mobility, Decreased IADLs, Decreased safe judgment during ADL  Prognosis: Fair  Barriers to Discharge: None  Evaluation/Treatment Tolerance: Other (Comment) (Weakness)  Medical Staff Made Aware: Yes  Strengths: Attitude of self, Insight into problems, Support of Caregivers  Barriers to Participation: Comorbidities    Subjective   Current Problem:  1. Small bowel obstruction (Multi)  Case Request Operating Room: Exploration Laparotomy, possible bowel resection, possible stoma creation    Case  Request Operating Room: Exploration Laparotomy, possible bowel resection, possible stoma creation    Cardiac Device Check - Inpatient    Cardiac Device Check - Inpatient    Surgical Pathology Exam    Surgical Pathology Exam    CANCELED: Case Request Operating Room: Exploration Laparotomy    CANCELED: Case Request Operating Room: Exploration Laparotomy      2. Cardiac defibrillator in place  Cardiac Device Check - Inpatient    Cardiac Device Check - Inpatient      3. Cardiomyopathy, unspecified type (Multi)  Cardiac Device Check - Inpatient    Cardiac Device Check - Inpatient        General:  Reason for Referral: Pt presented to the ER with abdominal pain, admitted for SBO 2/2 possible internal hernia  Past Medical History Relevant to Rehab: sick sinus syndrome status post pacemaker, hypertension, seizure, DM, afib, hyperthyroidism, PE, HF, cardiac arrest with defibrillator, stroke, sebaceous cysts  Prior to Session Communication: Bedside nurse  Patient Position Received: Bed, 3 rail up, Alarm on  Family/Caregiver Present: No  General Comment: Pt supine in bed upon arrival. Pt [place] and unable to hear without hearing aid which was not charged at time of evaluation. Increased time spent on communicating with pt via written text. Pt requesting to get OOB more often while admitted- educated on OT therapy schedule.   Precautions:  Hearing/Visual Limitations: [place]  Medical Precautions: Fall precautions, Abdominal precautions  Vital Signs:  Heart Rate: 81  SpO2: 92 %  Pain:  Pain Assessment  Pain Assessment: 0-10  0-10 (Numeric) Pain Score: 0 - No pain  Lines/Tubes/Drains:  PICC - Adult 06/19/24 Double lumen Right Basilic vein (Active)   Number of days: 1       NG/OG/Feeding Tube NG - [place] sump 16 Fr Left nostril (Active)   Number of days: 7       External Urinary Catheter Male (Active)   Number of days: 1     Objective   Cognition:  Overall Cognitive Status: Within Functional Limits  Orientation Level: Disoriented to  time  Attention: Within Functional Limits  Problem Solving: Within Functional Limits  Insight: Within function limits  Impulsive: Within functional limits  Processing Speed: Within funtional limits    Home Living:  Type of Home: House  Lives With: Other (Comment) (Late girlfriend's son and his dog)  Home Adaptive Equipment: Other (Comment) (Rollator)  Home Layout: Able to live on main level with bedroom/bathroom  Home Access: Stairs to enter with rails  Entrance Stairs-Rails: Both  Entrance Stairs-Number of Steps: 3  Bathroom Shower/Tub: Walk-in shower  Bathroom Toilet: Standard  Bathroom Equipment: Grab bars in shower, Built-in shower seat  Bathroom Accessibility: HHA assists with bathroom accessibility for safety   Prior Function:  Level of [place]: Independent with ADLs and functional transfers, Needs assistance with homemaking  Receives Help From: Home health (HHA 2 hrs/day 5 days/wk)  ADL Assistance: Needs assistance  Bath: Moderate  Toileting: Minimal  Dressing: Moderate  Grooming: Minimal  Feeding: Minimal  Homemaking Assistance: Needs assistance  Homemaking Assistance Comments: HHA completes most all homemaking tasks per report  Ambulatory Assistance: Independent (Rollator)  Prior Function Comments: Anticipated assist levels based on pt report  IADL History:  Homemaking Responsibilities: No  Homemaking Comments: HHA completes most homemaking tasks  Current License: No  Mode of Transportation: Other (Comment) (Neighbor)  Occupation: Retired  Leisure and Hobbies: TV  ADL:  Eating Assistance: Minimal (As demonstrated with ice chips)  Eating Deficit: Setup, Increased time to complete  Grooming Assistance: Minimal (Anticipated)  Bathing Assistance: Maximal (Anticipated)  UE Dressing Assistance: Moderate (Anticipated)  LE Dressing Assistance: Maximal (Anticipated)  Toileting Assistance with Device: Moderate (Anticipated)  Activity Tolerance:  Endurance: Decreased tolerance for upright  activites  Balance:  Dynamic Sitting Balance  Dynamic Sitting-Balance Support: Right upper extremity supported, Feet supported  Dynamic Sitting-Balance: Forward lean, Lateral lean  Dynamic Sitting-Comments: Fair sitting balance requiring Min-Mod A to correct right sided lean twice, ModA during MMT  Dynamic Standing Balance  Dynamic Standing-Balance Support: Bilateral upper extremity supported  Dynamic Standing-Balance: Turning  Dynamic Standing-Comments: MaxAx2  Static Sitting Balance  Static Sitting-Balance Support: Right upper extremity supported, Feet supported  Static Sitting-Level of Assistance: Close supervision, Moderate assistance  Static Sitting-Comment/Number of Minutes: Sat EOB ~12 minutes with SBA when supporting self with RUE, ModAx1 without UE support, 1 retro LOB requiring MaxAx1 to correct  Static Standing Balance  Static Standing-Balance Support: Bilateral upper extremity supported  Static Standing-Level of Assistance: Maximum assistance (+2)  Bed Mobility/Transfers: Bed Mobility/Transfers: Bed Mobility  Bed Mobility: Yes  Bed Mobility 1  Bed Mobility 1: Supine to sitting  Level of Assistance 1: Moderate assistance, Moderate verbal cues, Minimal tactile cues  Bed Mobility Comments 1: MOD hand held assistance, use of draw sheet, use of bedrails, increased time needed  Bed Mobility 2  Bed Mobility  2: Scooting (To EOB)  Level of Assistance 2: Moderate assistance  Bed Mobility Comments 2: Use of drawsheet  Functional Mobility  Functional Mobility Performed: Yes  Functional Mobility 1  Comments 1: Pt took several steps from bedside to chair nearby with Max arm in arm assistance x2 and VCs for guidance   and Transfers  Transfer: Yes  Transfer 1  Transfer From 1: Bed to, Stand to  Transfer to 1: Stand, Bed  Technique 1: Sit to stand, Stand to sit  Transfer Device 1: Walker  Transfer Level of Assistance 1: Maximum assistance, Minimal verbal cues, Minimal tactile cues  Trials/Comments 1: x2 attempts,  acheived partial stand but ultimately required +2 assistance  Transfers 2  Transfer From 2: Bed to  Transfer to 2: Stand  Technique 2: Sit to stand  Transfer Device 2:  (Arm in arm)  Transfer Level of Assistance 2: Arm in arm assistance, Maximum assistance, +2, Minimal verbal cues  Trials/Comments 2: RN assisted this OT  IADL's:   Homemaking Responsibilities: No  Homemaking Comments: HHA completes most homemaking tasks  Current License: No  Mode of Transportation: Other (Comment) (Neighbor)  Occupation: Retired  Leisure and Hobbies: TV  Vision: Vision - Basic Assessment  Current Vision: Wears glasses all the time   and Vision - Complex Assessment  Ocular Range of Motion: Within Functional Limits  Sensation:  Light Touch: No apparent deficits (BUE)  Strength:  Strength Comments: BUE WFL- grossly 4/5  Perception:  Inattention/Neglect: Appears intact  Coordination:  Movements are Fluid and Coordinated: Yes   Hand Function:  Hand Function  Gross Grasp: Functional  Coordination: Impaired (Tremors in hand noted while bringing spoon to mouth (ice chips))  Extremities:   RUE   RUE : Within Functional Limits, LUE   LUE: Within Functional Limits      Outcome Measures: [facility] Daily Activity  Putting on and taking off regular lower body clothing: A lot  Bathing (including washing, rinsing, drying): A lot  Putting on and taking off regular upper body clothing: A lot  Toileting, which includes using toilet, bedpan or urinal: A lot  Taking care of personal grooming such as brushing teeth: A little  Eating Meals: A little  Daily Activity - Total Score: 14    OT Adult Other Outcome Measures  4AT: -    Education Documentation  Body Mechanics, taught by Antonio Caal OT at 6/20/2024  3:19 PM.  Learner: Patient  Readiness: Acceptance  Method: Demonstration  Response: Verbalizes Understanding, Demonstrated Understanding    Precautions, taught by Antonio Caal OT at 6/20/2024  3:19 PM.  Learner: Patient  Readiness:  Acceptance  Method: Demonstration  Response: Verbalizes Understanding, Demonstrated Understanding    ADL Training, taught by Antonio Caal OT at 6/20/2024  3:19 PM.  Learner: Patient  Readiness: Acceptance  Method: Demonstration  Response: Verbalizes Understanding, Demonstrated Understanding    Education Comments  No comments found.      Goals:   Encounter Problems       Encounter Problems (Active)       ADLs       Pt will complete LB dressing with Moderate level of assistance while seated and/or standing.         Start:  06/20/24    Expected End:  07/04/24            Pt will complete UB /LB bathing tasks with Moderate level of assistance while seated.        Start:  06/20/24    Expected End:  07/04/24               BALANCE       Patientt will maintain static standing balance during ADL task with contact guard assist level of assistance in order to demonstrate decreased risk of falling and improved postural control.       Start:  06/20/24    Expected End:  07/04/24               EXERCISE/STRENGTHENING       Patient will be educated on BUE HEP for increased ADL performance.       Start:  06/20/24    Expected End:  07/04/24               MOBILITY       Patient will perform Functional mobility mod  Household distances/Community Distances with modified independent level of assistance and least restrictive device in order to improve safety and functional mobility.       Start:  06/20/24    Expected End:  07/04/24               TRANSFERS       Patient will perform bed mobility stand by assist level of assistance and bed rails in order to improve safety and independence with mobility       Start:  06/20/24    Expected End:  07/04/24            Patient will complete sit to stand transfer with minimal assist  level of assistance and least restrictive device in order to improve safety and prepare for out of bed mobility.       Start:  06/20/24    Expected End:  07/04/24                   Treatment Completed on  Evaluation    Therapy/Activity:     Therapeutic Activity  Therapeutic Activity Performed: Yes  Therapeutic Activity 1: Functional transfer training, functional mobility training, sitting balance training as noted requiring skilled assistance, cueing, and increased time  Therapeutic Activity 2: Increased time spent on communicating with pt 2/2 not having hearing aids charged    06/20/24 at 3:21 PM   Antonio Caal, OT   Rehab Office: [phone]

## 2024-06-20 NOTE — CARE PLAN
The patient's goals for the shift include Labs WNL    The clinical goals for the shift include Pt will remain free from fall or injury by the end of this shift.    Over the shift, the patient did make progress toward the following goals. Pt assisted to the chair today and encouraged to cough, deep breath, and use his IS to help promote lung expansion and decrease risk of PNA. RT consulted also to help assist patient's respiratory status. Pt started on heparin drip also this morning.

## 2024-06-21 LAB
ALBUMIN SERPL BCP-MCNC: 2.4 G/DL (ref 3.4–5)
ANION GAP SERPL CALC-SCNC: 12 MMOL/L (ref 10–20)
BUN SERPL-MCNC: 32 MG/DL (ref 6–23)
CALCIUM SERPL-MCNC: 7.9 MG/DL (ref 8.6–10.6)
CHLORIDE SERPL-SCNC: 104 MMOL/L (ref 98–107)
CO2 SERPL-SCNC: 32 MMOL/L (ref 21–32)
CREAT SERPL-MCNC: 0.94 MG/DL (ref 0.5–1.3)
EGFRCR SERPLBLD CKD-EPI 2021: 76 ML/MIN/1.73M*2
ERYTHROCYTE [DISTWIDTH] IN BLOOD BY AUTOMATED COUNT: 12.2 % (ref 11.5–14.5)
GLUCOSE BLD MANUAL STRIP-MCNC: 277 MG/DL (ref 74–99)
GLUCOSE BLD MANUAL STRIP-MCNC: 278 MG/DL (ref 74–99)
GLUCOSE BLD MANUAL STRIP-MCNC: 309 MG/DL (ref 74–99)
GLUCOSE SERPL-MCNC: 258 MG/DL (ref 74–99)
HCT VFR BLD AUTO: 30.1 % (ref 41–52)
HGB BLD-MCNC: 10.1 G/DL (ref 13.5–17.5)
MAGNESIUM SERPL-MCNC: 1.87 MG/DL (ref 1.6–2.4)
MCH RBC QN AUTO: 31 PG (ref 26–34)
MCHC RBC AUTO-ENTMCNC: 33.6 G/DL (ref 32–36)
MCV RBC AUTO: 92 FL (ref 80–100)
NRBC BLD-RTO: 0 /100 WBCS (ref 0–0)
PHOSPHATE SERPL-MCNC: 1.7 MG/DL (ref 2.5–4.9)
PLATELET # BLD AUTO: 139 X10*3/UL (ref 150–450)
POTASSIUM SERPL-SCNC: 3.5 MMOL/L (ref 3.5–5.3)
RBC # BLD AUTO: 3.26 X10*6/UL (ref 4.5–5.9)
SODIUM SERPL-SCNC: 144 MMOL/L (ref 136–145)
UFH PPP CHRO-ACNC: 0.3 IU/ML
WBC # BLD AUTO: 12.3 X10*3/UL (ref 4.4–11.3)

## 2024-06-21 PROCEDURE — 83735 ASSAY OF MAGNESIUM: CPT

## 2024-06-21 PROCEDURE — 2500000001 HC RX 250 WO HCPCS SELF ADMINISTERED DRUGS (ALT 637 FOR MEDICARE OP)

## 2024-06-21 PROCEDURE — 2500000002 HC RX 250 W HCPCS SELF ADMINISTERED DRUGS (ALT 637 FOR MEDICARE OP, ALT 636 FOR OP/ED): Performed by: NURSE PRACTITIONER

## 2024-06-21 PROCEDURE — 2500000005 HC RX 250 GENERAL PHARMACY W/O HCPCS

## 2024-06-21 PROCEDURE — 85027 COMPLETE CBC AUTOMATED: CPT

## 2024-06-21 PROCEDURE — 2580000001 HC RX 258 IV SOLUTIONS: Performed by: NURSE PRACTITIONER

## 2024-06-21 PROCEDURE — 82947 ASSAY GLUCOSE BLOOD QUANT: CPT

## 2024-06-21 PROCEDURE — 2500000004 HC RX 250 GENERAL PHARMACY W/ HCPCS (ALT 636 FOR OP/ED)

## 2024-06-21 PROCEDURE — 85520 HEPARIN ASSAY: CPT

## 2024-06-21 PROCEDURE — 80069 RENAL FUNCTION PANEL: CPT

## 2024-06-21 PROCEDURE — 99024 POSTOP FOLLOW-UP VISIT: CPT | Performed by: SURGERY

## 2024-06-21 PROCEDURE — 2500000004 HC RX 250 GENERAL PHARMACY W/ HCPCS (ALT 636 FOR OP/ED): Performed by: NURSE PRACTITIONER

## 2024-06-21 PROCEDURE — 1200000002 HC GENERAL ROOM WITH TELEMETRY DAILY

## 2024-06-21 PROCEDURE — C9113 INJ PANTOPRAZOLE SODIUM, VIA: HCPCS

## 2024-06-21 RX ORDER — POTASSIUM CHLORIDE 14.9 MG/ML
20 INJECTION INTRAVENOUS
Status: COMPLETED | OUTPATIENT
Start: 2024-06-21 | End: 2024-06-21

## 2024-06-21 ASSESSMENT — PAIN DESCRIPTION - DESCRIPTORS: DESCRIPTORS: SORE

## 2024-06-21 ASSESSMENT — COGNITIVE AND FUNCTIONAL STATUS - GENERAL
WALKING IN HOSPITAL ROOM: A LOT
EATING MEALS: A LITTLE
STANDING UP FROM CHAIR USING ARMS: A LOT
MOBILITY SCORE: 11
DRESSING REGULAR LOWER BODY CLOTHING: A LOT
DRESSING REGULAR UPPER BODY CLOTHING: A LOT
DAILY ACTIVITIY SCORE: 14
TOILETING: A LOT
MOVING TO AND FROM BED TO CHAIR: A LOT
HELP NEEDED FOR BATHING: A LOT
MOVING FROM LYING ON BACK TO SITTING ON SIDE OF FLAT BED WITH BEDRAILS: A LOT
TURNING FROM BACK TO SIDE WHILE IN FLAT BAD: A LOT
PERSONAL GROOMING: A LITTLE
CLIMB 3 TO 5 STEPS WITH RAILING: TOTAL

## 2024-06-21 ASSESSMENT — PAIN SCALES - GENERAL
PAINLEVEL_OUTOF10: 2
PAINLEVEL_OUTOF10: 0 - NO PAIN
PAINLEVEL_OUTOF10: 1

## 2024-06-21 ASSESSMENT — PAIN - FUNCTIONAL ASSESSMENT
PAIN_FUNCTIONAL_ASSESSMENT: 0-10
PAIN_FUNCTIONAL_ASSESSMENT: 0-10

## 2024-06-21 NOTE — PROGRESS NOTES
[facility]  ACUTE CARE SURGERY - PROGRESS NOTE    Patient Name: Enrike Crespo  MRN: [id number]  Admit Date: 613  : 1931  AGE: 89 y.o.   GENDER: male  ==============================================================================  TODAY'S ASSESSMENT AND PLAN OF CARE:  Enrike Crespo is a 89 y.o. male with PMH sick sinus syndrome status post pacemaker, hypertension, seizure, DM, afib, hyperthyroidism, PE, HF, cardiac arrest with defibrillator, stroke, sebaceous cysts, who presented for SBO 2/2 possible internal hernia.     GGC w/ SBFT  showed contrast retained in the stomach which was subsequently suctioned out from NGT after back on suction for increasing oxygen requirement. Abdominal exam is unchanged. Patient had about 4.8L out from NGT with new SHERLYN. Increased IVF to 100mL/hr but due to concern of heart failure and increased oxygen requirement, will hold off any any fluid boluses. Creatinine is down trending today. Still requiring 3L NC for SpO2 >90%. Not having consistent flatus or bowel movements but NGT output is decreased. Plan for CT A/P non con (concern for SHERLYN) with oral contrast and diagnostic and therapeutic for SBO. Also will give an enema.  CT findings concerning for a closed loop small bowel obstruction likely secondary to a pericecal internal hernia. Patient taken to the OR on .    OR : exploratory laparotomy, lysis of adhesions, small bowel resection with anastomosis, closure   Patient at high risk post op for aspiration and pneumonia. Poor respiratory effort at baseline. Aggressive bronchopulmonary hygiene, RT consult, IS, etc. No hard signs of ROBF yet, will continue NGT until flatus or BM.     Neuro: acute postop pain  - IV Dilaudid [id number] PRN     CV: Hx of Sick sinus s/p pacemaker, HTN, afib, HF  - continue home metoprolol via IV while NPO  - continue home statin  - holding home Entresto  - holding home Eliquis and Plavix - started on hep  gtt. Therapeutic. Will evaluate for plavix restart.   - continuous tele monitoring     Pulm  - wean NC as tolerated  - RT  - Continue Duo-Neb  - aggressive bronchopulmonary hygiene  - 10x per hour IS  - upright in bed 45 degrees     GI  - NPO  - NGT to LIWS   - PPI BID  - Severe Malnutrition 2/2 prolonged NPO status- nutrition consult for TPN recs   - Start on Thiamine 100mg for 7 days   - TPN at goal     : Hx of urinary retention  - Strict I&Os,   - holding home finasteride while NPO   - continue IVFs   - replete lytes as indicated   - SHERLYN resolved      Heme: hx of PE  - holding home Eliquis - on hep gtt  - Trend H/H daily, transfuse for Hgb <7    Endo: Hx of DM, hyperparathyroidism   - continue home PTU  - Continue home Lantus at half dose 7units qAM  - Continue blood glucose checks q6 hours and SSI     Skin  - Triamcinolone cream - home med      ID  - daily CBC  - CTX for UTI - discontinued on 6/20  - PICC line for parenteral nutrition      Dispo: continue RNF, is okay with going to SNF     Patient seen and discussed with Attending Dr. Manish Lewis MD  Acute Care Surgery   P [id number]    CHIEF COMPLAINT / EVENTS LAST 24HRS / HPI:  No acute events overnight. Patient has chronic urinary retention and is currently off home meds. Slow to awake butt answers questions appropriately. Continues to have respiratory secretions but able to attempt and cough. No flatus or BM yet.     PHYSICAL EXAM:  Heart Rate:  [52-88]   Temp:  [36.3 °C (97.3 °F)-36.9 °C (98.4 °F)]   Resp:  [15-19]   BP: (106-120)/(56-64)   SpO2:  [91 %-99 %]   Constitutional: no acute distress, old man, thin, frail, soft spoken, hard of hearing  Neuro: A/O x4, no gross deficits   Psych: normal affect  HEENT: No deformities, no scleral icterus   Cardiac: RRR  Pulmonary: unlabored respirations, decreased breath sounds bilaterally, shallow breaths, equal chest rise, on 3L  Abdomen: soft, non distended, non tender  Skin: warm and dry overall     Extremities: no swelling noted  MSK: moving all four      IMAGING SUMMARY:  (summary of new imaging findings, not a copy of dictation)  NA    LABS:  Results from last 7 days   Lab Units 06/21/24  0531 06/20/24  0510 06/19/24  0647 06/15/24  0541 06/14/24  1045   WBC AUTO x10*3/uL 12.3* 9.2 8.2   < > 3.0*   HEMOGLOBIN g/dL 10.1* 10.5* 12.2*   < > 12.8*   HEMATOCRIT % 30.1* 33.6* 40.3*   < > 39.7*   PLATELETS AUTO x10*3/uL 139* 151 168   < > 264   LYMPHO PCT MAN %  --   --   --   --  11.2   MONO PCT MAN %  --   --   --   --  13.8   EOSINO PCT MAN %  --   --   --   --  0.9    < > = values in this interval not displayed.     Results from last 7 days   Lab Units 06/19/24  1116   APTT seconds 31   INR  1.2*     Results from last 7 days   Lab Units 06/21/24  0531 06/20/24  0510 06/19/24  0647   SODIUM mmol/L 144 146* 148*   POTASSIUM mmol/L 3.5 3.7 4.1   CHLORIDE mmol/L 104 105 106   CO2 mmol/L 32 33* 33*   BUN mg/dL 32* 29* 24*   CREATININE mg/dL 0.94 1.13 1.09   CALCIUM mg/dL 7.9* 7.8* 8.1*   GLUCOSE mg/dL 258* 203* 135*                 I have reviewed all medications, laboratory results, and imaging pertinent for today's encounter.

## 2024-06-21 NOTE — CARE PLAN
Problem: Skin  Goal: Decreased wound size/increased tissue granulation at next dressing change  Outcome: Progressing  Goal: Participates in plan/prevention/treatment measures  Outcome: Progressing  Goal: Prevent/manage excess moisture  Outcome: Progressing  Goal: Prevent/minimize sheer/friction injuries  Outcome: Progressing  Goal: Promote/optimize nutrition  Outcome: Progressing  Goal: Promote skin healing  Outcome: Progressing     Problem: Fall/Injury  Goal: Not fall by end of shift  Outcome: Progressing  Goal: Be free from injury by end of the shift  Outcome: Progressing  Goal: Verbalize understanding of personal risk factors for fall in the hospital  Outcome: Progressing  Goal: Verbalize understanding of risk factor reduction measures to prevent injury from fall in the home  Outcome: Progressing  Goal: Use assistive devices by end of the shift  Outcome: Progressing  Goal: Pace activities to prevent fatigue by end of the shift  Outcome: Progressing     Problem: Diabetes  Goal: Achieve decreasing blood glucose levels by end of shift  Outcome: Progressing  Goal: Increase stability of blood glucose readings by end of shift  Outcome: Progressing  Goal: Decrease in ketones present in urine by end of shift  Outcome: Progressing  Goal: Maintain electrolyte levels within acceptable range throughout shift  Outcome: Progressing  Goal: Maintain glucose levels >70mg/dl to <250mg/dl throughout shift  Outcome: Progressing  Goal: No changes in neurological exam by end of shift  Outcome: Progressing  Goal: Learn about and adhere to nutrition recommendations by end of shift  Outcome: Progressing  Goal: Vital signs within normal range for age by end of shift  Outcome: Progressing  Goal: Increase self care and/or family involovement by end of shift  Outcome: Progressing  Goal: Receive DSME education by end of shift  Outcome: Progressing   The patient's goals for the shift include Labs WNL    The clinical goals for the shift  include safety

## 2024-06-21 NOTE — CARE PLAN
The patient's goals for the shift include Labs WNL    The clinical goals for the shift include safety      Problem: Skin  Goal: Decreased wound size/increased tissue granulation at next dressing change  Outcome: Progressing  Goal: Participates in plan/prevention/treatment measures  Outcome: Progressing  Goal: Prevent/manage excess moisture  Outcome: Progressing  Goal: Prevent/minimize sheer/friction injuries  Outcome: Progressing  Goal: Promote/optimize nutrition  Outcome: Progressing  Goal: Promote skin healing  Outcome: Progressing     Problem: Fall/Injury  Goal: Not fall by end of shift  Outcome: Progressing  Goal: Be free from injury by end of the shift  Outcome: Progressing  Goal: Verbalize understanding of personal risk factors for fall in the hospital  Outcome: Progressing  Goal: Verbalize understanding of risk factor reduction measures to prevent injury from fall in the home  Outcome: Progressing  Goal: Use assistive devices by end of the shift  Outcome: Progressing  Goal: Pace activities to prevent fatigue by end of the shift  Outcome: Progressing     Problem: Diabetes  Goal: Achieve decreasing blood glucose levels by end of shift  Outcome: Progressing  Goal: Increase stability of blood glucose readings by end of shift  Outcome: Progressing  Goal: Decrease in ketones present in urine by end of shift  Outcome: Progressing  Goal: Maintain electrolyte levels within acceptable range throughout shift  Outcome: Progressing  Goal: Maintain glucose levels >70mg/dl to <250mg/dl throughout shift  Outcome: Progressing  Goal: No changes in neurological exam by end of shift  Outcome: Progressing  Goal: Learn about and adhere to nutrition recommendations by end of shift  Outcome: Progressing  Goal: Vital signs within normal range for age by end of shift  Outcome: Progressing  Goal: Increase self care and/or family involovement by end of shift  Outcome: Progressing  Goal: Receive DSME education by end of shift  Outcome:  Progressing     Problem: Safety - Adult  Goal: Free from fall injury  Outcome: Progressing     Problem: Discharge Planning  Goal: Discharge to home or other facility with appropriate resources  Outcome: Progressing     Problem: Chronic Conditions and Co-morbidities  Goal: Patient's chronic conditions and co-morbidity symptoms are monitored and maintained or improved  Outcome: Progressing

## 2024-06-22 ENCOUNTER — APPOINTMENT (OUTPATIENT)
Dept: RADIOLOGY | Facility: HOSPITAL | Age: 89
DRG: 329 | End: 2024-06-22
Payer: MEDICARE

## 2024-06-22 LAB
ALBUMIN SERPL BCP-MCNC: 2.4 G/DL (ref 3.4–5)
ALBUMIN SERPL BCP-MCNC: 2.6 G/DL (ref 3.4–5)
ANION GAP BLDA CALCULATED.4IONS-SCNC: 8 MMO/L (ref 10–25)
ANION GAP BLDA CALCULATED.4IONS-SCNC: 8 MMO/L (ref 10–25)
ANION GAP SERPL CALC-SCNC: 11 MMOL/L (ref 10–20)
ANION GAP SERPL CALC-SCNC: 13 MMOL/L (ref 10–20)
APPARATUS: ABNORMAL
APTT PPP: 50 SECONDS (ref 27–38)
ARTERIAL PATENCY WRIST A: POSITIVE
BASE EXCESS BLDA CALC-SCNC: 6.3 MMOL/L (ref -2–3)
BASE EXCESS BLDA CALC-SCNC: 7.2 MMOL/L (ref -2–3)
BODY TEMPERATURE: 37 DEGREES CELSIUS
BODY TEMPERATURE: ABNORMAL
BUN SERPL-MCNC: 26 MG/DL (ref 6–23)
BUN SERPL-MCNC: 27 MG/DL (ref 6–23)
CA-I BLDA-SCNC: 1.17 MMOL/L (ref 1.1–1.33)
CA-I BLDA-SCNC: 1.2 MMOL/L (ref 1.1–1.33)
CALCIUM SERPL-MCNC: 7.9 MG/DL (ref 8.6–10.6)
CALCIUM SERPL-MCNC: 8 MG/DL (ref 8.6–10.6)
CHLORIDE BLDA-SCNC: 101 MMOL/L (ref 98–107)
CHLORIDE BLDA-SCNC: 102 MMOL/L (ref 98–107)
CHLORIDE SERPL-SCNC: 102 MMOL/L (ref 98–107)
CHLORIDE SERPL-SCNC: 99 MMOL/L (ref 98–107)
CO2 SERPL-SCNC: 30 MMOL/L (ref 21–32)
CO2 SERPL-SCNC: 31 MMOL/L (ref 21–32)
CREAT SERPL-MCNC: 0.71 MG/DL (ref 0.5–1.3)
CREAT SERPL-MCNC: 0.82 MG/DL (ref 0.5–1.3)
EGFRCR SERPLBLD CKD-EPI 2021: 82 ML/MIN/1.73M*2
EGFRCR SERPLBLD CKD-EPI 2021: 86 ML/MIN/1.73M*2
ERYTHROCYTE [DISTWIDTH] IN BLOOD BY AUTOMATED COUNT: 12.5 % (ref 11.5–14.5)
ERYTHROCYTE [DISTWIDTH] IN BLOOD BY AUTOMATED COUNT: 12.5 % (ref 11.5–14.5)
GLUCOSE BLD MANUAL STRIP-MCNC: 158 MG/DL (ref 74–99)
GLUCOSE BLD MANUAL STRIP-MCNC: 170 MG/DL (ref 74–99)
GLUCOSE BLD MANUAL STRIP-MCNC: 209 MG/DL (ref 74–99)
GLUCOSE BLD MANUAL STRIP-MCNC: 276 MG/DL (ref 74–99)
GLUCOSE BLD MANUAL STRIP-MCNC: 312 MG/DL (ref 74–99)
GLUCOSE BLD MANUAL STRIP-MCNC: 353 MG/DL (ref 74–99)
GLUCOSE BLDA-MCNC: 326 MG/DL (ref 74–99)
GLUCOSE BLDA-MCNC: 358 MG/DL (ref 74–99)
GLUCOSE SERPL-MCNC: 158 MG/DL (ref 74–99)
GLUCOSE SERPL-MCNC: 284 MG/DL (ref 74–99)
HCO3 BLDA-SCNC: 30.6 MMOL/L (ref 22–26)
HCO3 BLDA-SCNC: 31.2 MMOL/L (ref 22–26)
HCT VFR BLD AUTO: 32.2 % (ref 41–52)
HCT VFR BLD AUTO: 33 % (ref 41–52)
HCT VFR BLD EST: 33 % (ref 41–52)
HCT VFR BLD EST: 35 % (ref 41–52)
HGB BLD-MCNC: 10.5 G/DL (ref 13.5–17.5)
HGB BLD-MCNC: 10.5 G/DL (ref 13.5–17.5)
HGB BLDA-MCNC: 11 G/DL (ref 13.5–17.5)
HGB BLDA-MCNC: 11.5 G/DL (ref 13.5–17.5)
INHALED O2 CONCENTRATION: 100 %
INHALED O2 CONCENTRATION: 60 %
INR PPP: 1.3 (ref 0.9–1.1)
LACTATE BLDA-SCNC: 1.6 MMOL/L (ref 0.4–2)
LACTATE BLDA-SCNC: 1.9 MMOL/L (ref 0.4–2)
MAGNESIUM SERPL-MCNC: 1.54 MG/DL (ref 1.6–2.4)
MAGNESIUM SERPL-MCNC: 2.04 MG/DL (ref 1.6–2.4)
MCH RBC QN AUTO: 30.6 PG (ref 26–34)
MCH RBC QN AUTO: 30.8 PG (ref 26–34)
MCHC RBC AUTO-ENTMCNC: 31.8 G/DL (ref 32–36)
MCHC RBC AUTO-ENTMCNC: 32.6 G/DL (ref 32–36)
MCV RBC AUTO: 94 FL (ref 80–100)
MCV RBC AUTO: 97 FL (ref 80–100)
NRBC BLD-RTO: 0 /100 WBCS (ref 0–0)
NRBC BLD-RTO: 0 /100 WBCS (ref 0–0)
OXYHGB MFR BLDA: 92.7 % (ref 94–98)
OXYHGB MFR BLDA: 93 % (ref 94–98)
PCO2 BLDA: 41 MM HG (ref 38–42)
PCO2 BLDA: 42 MM HG (ref 38–42)
PH BLDA: 7.47 PH (ref 7.38–7.42)
PH BLDA: 7.49 PH (ref 7.38–7.42)
PHOSPHATE SERPL-MCNC: 1.7 MG/DL (ref 2.5–4.9)
PHOSPHATE SERPL-MCNC: 2.1 MG/DL (ref 2.5–4.9)
PLATELET # BLD AUTO: 160 X10*3/UL (ref 150–450)
PLATELET # BLD AUTO: 161 X10*3/UL (ref 150–450)
PO2 BLDA: 63 MM HG (ref 85–95)
PO2 BLDA: 68 MM HG (ref 85–95)
POTASSIUM BLDA-SCNC: 3.7 MMOL/L (ref 3.5–5.3)
POTASSIUM BLDA-SCNC: 3.9 MMOL/L (ref 3.5–5.3)
POTASSIUM SERPL-SCNC: 3.9 MMOL/L (ref 3.5–5.3)
POTASSIUM SERPL-SCNC: 3.9 MMOL/L (ref 3.5–5.3)
PROTHROMBIN TIME: 14.1 SECONDS (ref 9.8–12.8)
RBC # BLD AUTO: 3.41 X10*6/UL (ref 4.5–5.9)
RBC # BLD AUTO: 3.43 X10*6/UL (ref 4.5–5.9)
SAO2 % BLDA: 94 % (ref 94–100)
SAO2 % BLDA: 95 % (ref 94–100)
SODIUM BLDA-SCNC: 136 MMOL/L (ref 136–145)
SODIUM BLDA-SCNC: 137 MMOL/L (ref 136–145)
SODIUM SERPL-SCNC: 137 MMOL/L (ref 136–145)
SODIUM SERPL-SCNC: 141 MMOL/L (ref 136–145)
SPECIMEN DRAWN FROM PATIENT: ABNORMAL
UFH PPP CHRO-ACNC: 0.2 IU/ML
UFH PPP CHRO-ACNC: 0.2 IU/ML
UFH PPP CHRO-ACNC: 0.3 IU/ML
UFH PPP CHRO-ACNC: 0.4 IU/ML
WBC # BLD AUTO: 17 X10*3/UL (ref 4.4–11.3)
WBC # BLD AUTO: 20.1 X10*3/UL (ref 4.4–11.3)

## 2024-06-22 PROCEDURE — 84132 ASSAY OF SERUM POTASSIUM: CPT | Mod: 91 | Performed by: PHYSICIAN ASSISTANT

## 2024-06-22 PROCEDURE — 83735 ASSAY OF MAGNESIUM: CPT | Mod: 91 | Performed by: STUDENT IN AN ORGANIZED HEALTH CARE EDUCATION/TRAINING PROGRAM

## 2024-06-22 PROCEDURE — 84132 ASSAY OF SERUM POTASSIUM: CPT

## 2024-06-22 PROCEDURE — 71045 X-RAY EXAM CHEST 1 VIEW: CPT

## 2024-06-22 PROCEDURE — 2500000002 HC RX 250 W HCPCS SELF ADMINISTERED DRUGS (ALT 637 FOR MEDICARE OP, ALT 636 FOR OP/ED)

## 2024-06-22 PROCEDURE — 2500000001 HC RX 250 WO HCPCS SELF ADMINISTERED DRUGS (ALT 637 FOR MEDICARE OP): Performed by: STUDENT IN AN ORGANIZED HEALTH CARE EDUCATION/TRAINING PROGRAM

## 2024-06-22 PROCEDURE — 02H633Z INSERTION OF INFUSION DEVICE INTO RIGHT ATRIUM, PERCUTANEOUS APPROACH: ICD-10-PCS | Performed by: STUDENT IN AN ORGANIZED HEALTH CARE EDUCATION/TRAINING PROGRAM

## 2024-06-22 PROCEDURE — 2500000001 HC RX 250 WO HCPCS SELF ADMINISTERED DRUGS (ALT 637 FOR MEDICARE OP)

## 2024-06-22 PROCEDURE — 2500000004 HC RX 250 GENERAL PHARMACY W/ HCPCS (ALT 636 FOR OP/ED)

## 2024-06-22 PROCEDURE — 85520 HEPARIN ASSAY: CPT | Mod: 91 | Performed by: STUDENT IN AN ORGANIZED HEALTH CARE EDUCATION/TRAINING PROGRAM

## 2024-06-22 PROCEDURE — 99024 POSTOP FOLLOW-UP VISIT: CPT | Performed by: SURGERY

## 2024-06-22 PROCEDURE — 2500000004 HC RX 250 GENERAL PHARMACY W/ HCPCS (ALT 636 FOR OP/ED): Performed by: NURSE PRACTITIONER

## 2024-06-22 PROCEDURE — 2500000002 HC RX 250 W HCPCS SELF ADMINISTERED DRUGS (ALT 637 FOR MEDICARE OP, ALT 636 FOR OP/ED): Performed by: NURSE PRACTITIONER

## 2024-06-22 PROCEDURE — 83735 ASSAY OF MAGNESIUM: CPT

## 2024-06-22 PROCEDURE — 80069 RENAL FUNCTION PANEL: CPT

## 2024-06-22 PROCEDURE — 85610 PROTHROMBIN TIME: CPT | Performed by: STUDENT IN AN ORGANIZED HEALTH CARE EDUCATION/TRAINING PROGRAM

## 2024-06-22 PROCEDURE — C9113 INJ PANTOPRAZOLE SODIUM, VIA: HCPCS | Performed by: STUDENT IN AN ORGANIZED HEALTH CARE EDUCATION/TRAINING PROGRAM

## 2024-06-22 PROCEDURE — 2550000001 HC RX 255 CONTRASTS: Performed by: SURGERY

## 2024-06-22 PROCEDURE — 2500000004 HC RX 250 GENERAL PHARMACY W/ HCPCS (ALT 636 FOR OP/ED): Performed by: EMERGENCY MEDICINE

## 2024-06-22 PROCEDURE — 84132 ASSAY OF SERUM POTASSIUM: CPT | Mod: 91 | Performed by: STUDENT IN AN ORGANIZED HEALTH CARE EDUCATION/TRAINING PROGRAM

## 2024-06-22 PROCEDURE — 82947 ASSAY GLUCOSE BLOOD QUANT: CPT | Mod: 91

## 2024-06-22 PROCEDURE — 2500000004 HC RX 250 GENERAL PHARMACY W/ HCPCS (ALT 636 FOR OP/ED): Performed by: STUDENT IN AN ORGANIZED HEALTH CARE EDUCATION/TRAINING PROGRAM

## 2024-06-22 PROCEDURE — 71045 X-RAY EXAM CHEST 1 VIEW: CPT | Performed by: STUDENT IN AN ORGANIZED HEALTH CARE EDUCATION/TRAINING PROGRAM

## 2024-06-22 PROCEDURE — 99291 CRITICAL CARE FIRST HOUR: CPT | Performed by: STUDENT IN AN ORGANIZED HEALTH CARE EDUCATION/TRAINING PROGRAM

## 2024-06-22 PROCEDURE — 2020000001 HC ICU ROOM DAILY

## 2024-06-22 PROCEDURE — 87081 CULTURE SCREEN ONLY: CPT | Performed by: PHYSICIAN ASSISTANT

## 2024-06-22 PROCEDURE — 2500000005 HC RX 250 GENERAL PHARMACY W/O HCPCS

## 2024-06-22 PROCEDURE — 37799 UNLISTED PX VASCULAR SURGERY: CPT | Performed by: STUDENT IN AN ORGANIZED HEALTH CARE EDUCATION/TRAINING PROGRAM

## 2024-06-22 PROCEDURE — 94668 MNPJ CHEST WALL SBSQ: CPT

## 2024-06-22 PROCEDURE — 93010 ELECTROCARDIOGRAM REPORT: CPT | Performed by: STUDENT IN AN ORGANIZED HEALTH CARE EDUCATION/TRAINING PROGRAM

## 2024-06-22 PROCEDURE — 2580000001 HC RX 258 IV SOLUTIONS: Performed by: STUDENT IN AN ORGANIZED HEALTH CARE EDUCATION/TRAINING PROGRAM

## 2024-06-22 PROCEDURE — 85027 COMPLETE CBC AUTOMATED: CPT | Mod: 91 | Performed by: STUDENT IN AN ORGANIZED HEALTH CARE EDUCATION/TRAINING PROGRAM

## 2024-06-22 PROCEDURE — A9698 NON-RAD CONTRAST MATERIALNOC: HCPCS | Performed by: PHYSICIAN ASSISTANT

## 2024-06-22 PROCEDURE — 74177 CT ABD & PELVIS W/CONTRAST: CPT

## 2024-06-22 PROCEDURE — 2500000002 HC RX 250 W HCPCS SELF ADMINISTERED DRUGS (ALT 637 FOR MEDICARE OP, ALT 636 FOR OP/ED): Performed by: PHYSICIAN ASSISTANT

## 2024-06-22 PROCEDURE — 2500000005 HC RX 250 GENERAL PHARMACY W/O HCPCS: Performed by: STUDENT IN AN ORGANIZED HEALTH CARE EDUCATION/TRAINING PROGRAM

## 2024-06-22 PROCEDURE — 94640 AIRWAY INHALATION TREATMENT: CPT

## 2024-06-22 PROCEDURE — 94667 MNPJ CHEST WALL 1ST: CPT

## 2024-06-22 PROCEDURE — 71260 CT THORAX DX C+: CPT | Performed by: RADIOLOGY

## 2024-06-22 PROCEDURE — 2500000002 HC RX 250 W HCPCS SELF ADMINISTERED DRUGS (ALT 637 FOR MEDICARE OP, ALT 636 FOR OP/ED): Performed by: EMERGENCY MEDICINE

## 2024-06-22 PROCEDURE — C9113 INJ PANTOPRAZOLE SODIUM, VIA: HCPCS

## 2024-06-22 PROCEDURE — 85027 COMPLETE CBC AUTOMATED: CPT

## 2024-06-22 PROCEDURE — 31720 CLEARANCE OF AIRWAYS: CPT

## 2024-06-22 PROCEDURE — 85520 HEPARIN ASSAY: CPT

## 2024-06-22 PROCEDURE — 2550000001 HC RX 255 CONTRASTS: Performed by: PHYSICIAN ASSISTANT

## 2024-06-22 PROCEDURE — A4217 STERILE WATER/SALINE, 500 ML: HCPCS | Performed by: EMERGENCY MEDICINE

## 2024-06-22 PROCEDURE — 2500000004 HC RX 250 GENERAL PHARMACY W/ HCPCS (ALT 636 FOR OP/ED): Performed by: PHYSICIAN ASSISTANT

## 2024-06-22 PROCEDURE — 74177 CT ABD & PELVIS W/CONTRAST: CPT | Performed by: RADIOLOGY

## 2024-06-22 PROCEDURE — 36600 WITHDRAWAL OF ARTERIAL BLOOD: CPT

## 2024-06-22 RX ORDER — CEFEPIME 1 G/50ML
2 INJECTION, SOLUTION INTRAVENOUS EVERY 8 HOURS
Status: DISPENSED | OUTPATIENT
Start: 2024-06-22 | End: 2024-06-28

## 2024-06-22 RX ORDER — IPRATROPIUM BROMIDE AND ALBUTEROL SULFATE 2.5; .5 MG/3ML; MG/3ML
3 SOLUTION RESPIRATORY (INHALATION)
Status: DISPENSED | OUTPATIENT
Start: 2024-06-22

## 2024-06-22 RX ORDER — INSULIN GLARGINE 100 [IU]/ML
8 INJECTION, SOLUTION SUBCUTANEOUS ONCE
Status: COMPLETED | OUTPATIENT
Start: 2024-06-22 | End: 2024-06-22

## 2024-06-22 RX ORDER — ACETAMINOPHEN 10 MG/ML
1000 INJECTION, SOLUTION INTRAVENOUS EVERY 8 HOURS SCHEDULED
Status: DISPENSED | OUTPATIENT
Start: 2024-06-22

## 2024-06-22 RX ORDER — VANCOMYCIN HYDROCHLORIDE 1 G/20ML
INJECTION, POWDER, LYOPHILIZED, FOR SOLUTION INTRAVENOUS DAILY PRN
Status: DISCONTINUED | OUTPATIENT
Start: 2024-06-22 | End: 2024-06-23

## 2024-06-22 RX ORDER — INSULIN GLARGINE 100 [IU]/ML
15 INJECTION, SOLUTION SUBCUTANEOUS EVERY MORNING
Status: DISPENSED | OUTPATIENT
Start: 2024-06-23

## 2024-06-22 RX ORDER — INSULIN LISPRO 100 [IU]/ML
0-15 INJECTION, SOLUTION INTRAVENOUS; SUBCUTANEOUS EVERY 4 HOURS
Status: DISCONTINUED | OUTPATIENT
Start: 2024-06-22 | End: 2024-06-23

## 2024-06-22 RX ORDER — MAGNESIUM SULFATE HEPTAHYDRATE 40 MG/ML
4 INJECTION, SOLUTION INTRAVENOUS ONCE
Status: COMPLETED | OUTPATIENT
Start: 2024-06-22 | End: 2024-06-22

## 2024-06-22 ASSESSMENT — COGNITIVE AND FUNCTIONAL STATUS - GENERAL
TOILETING: A LOT
PERSONAL GROOMING: A LOT
STANDING UP FROM CHAIR USING ARMS: A LOT
DAILY ACTIVITIY SCORE: 12
HELP NEEDED FOR BATHING: A LOT
EATING MEALS: A LOT
MOVING FROM LYING ON BACK TO SITTING ON SIDE OF FLAT BED WITH BEDRAILS: A LOT
CLIMB 3 TO 5 STEPS WITH RAILING: TOTAL
PERSONAL GROOMING: A LOT
MOVING FROM LYING ON BACK TO SITTING ON SIDE OF FLAT BED WITH BEDRAILS: A LOT
DRESSING REGULAR LOWER BODY CLOTHING: A LOT
CLIMB 3 TO 5 STEPS WITH RAILING: TOTAL
CLIMB 3 TO 5 STEPS WITH RAILING: TOTAL
PERSONAL GROOMING: A LOT
TURNING FROM BACK TO SIDE WHILE IN FLAT BAD: A LOT
MOVING FROM LYING ON BACK TO SITTING ON SIDE OF FLAT BED WITH BEDRAILS: A LOT
DRESSING REGULAR UPPER BODY CLOTHING: A LOT
DRESSING REGULAR UPPER BODY CLOTHING: A LOT
DRESSING REGULAR LOWER BODY CLOTHING: A LOT
HELP NEEDED FOR BATHING: A LOT
STANDING UP FROM CHAIR USING ARMS: A LOT
DRESSING REGULAR LOWER BODY CLOTHING: A LOT
MOVING FROM LYING ON BACK TO SITTING ON SIDE OF FLAT BED WITH BEDRAILS: A LOT
TURNING FROM BACK TO SIDE WHILE IN FLAT BAD: A LOT
DRESSING REGULAR LOWER BODY CLOTHING: A LOT
WALKING IN HOSPITAL ROOM: A LOT
EATING MEALS: A LOT
WALKING IN HOSPITAL ROOM: A LOT
DRESSING REGULAR UPPER BODY CLOTHING: A LOT
WALKING IN HOSPITAL ROOM: A LOT
DRESSING REGULAR LOWER BODY CLOTHING: A LOT
PERSONAL GROOMING: A LOT
PERSONAL GROOMING: A LOT
STANDING UP FROM CHAIR USING ARMS: A LOT
MOVING FROM LYING ON BACK TO SITTING ON SIDE OF FLAT BED WITH BEDRAILS: A LOT
DRESSING REGULAR UPPER BODY CLOTHING: A LOT
DRESSING REGULAR UPPER BODY CLOTHING: A LOT
MOBILITY SCORE: 11
WALKING IN HOSPITAL ROOM: A LITTLE
EATING MEALS: A LOT
MOVING TO AND FROM BED TO CHAIR: A LOT
MOBILITY SCORE: 12
DRESSING REGULAR LOWER BODY CLOTHING: A LOT
MOVING FROM LYING ON BACK TO SITTING ON SIDE OF FLAT BED WITH BEDRAILS: A LOT
CLIMB 3 TO 5 STEPS WITH RAILING: TOTAL
TURNING FROM BACK TO SIDE WHILE IN FLAT BAD: A LOT
TOILETING: A LOT
TOILETING: A LOT
HELP NEEDED FOR BATHING: A LOT
DRESSING REGULAR UPPER BODY CLOTHING: A LOT
DRESSING REGULAR UPPER BODY CLOTHING: A LOT
EATING MEALS: A LOT
HELP NEEDED FOR BATHING: A LOT
WALKING IN HOSPITAL ROOM: A LOT
EATING MEALS: A LOT
DRESSING REGULAR LOWER BODY CLOTHING: A LOT
MOVING TO AND FROM BED TO CHAIR: A LOT
HELP NEEDED FOR BATHING: A LOT
HELP NEEDED FOR BATHING: A LOT
TURNING FROM BACK TO SIDE WHILE IN FLAT BAD: A LOT
PERSONAL GROOMING: A LOT
EATING MEALS: A LOT
MOVING TO AND FROM BED TO CHAIR: A LOT
TOILETING: A LOT
EATING MEALS: A LOT
STANDING UP FROM CHAIR USING ARMS: A LOT
WALKING IN HOSPITAL ROOM: A LOT
MOVING FROM LYING ON BACK TO SITTING ON SIDE OF FLAT BED WITH BEDRAILS: A LOT
DAILY ACTIVITIY SCORE: 12
TURNING FROM BACK TO SIDE WHILE IN FLAT BAD: A LOT
TURNING FROM BACK TO SIDE WHILE IN FLAT BAD: A LOT
HELP NEEDED FOR BATHING: A LOT
TURNING FROM BACK TO SIDE WHILE IN FLAT BAD: A LOT
CLIMB 3 TO 5 STEPS WITH RAILING: TOTAL
MOVING TO AND FROM BED TO CHAIR: A LOT
TURNING FROM BACK TO SIDE WHILE IN FLAT BAD: A LOT
WALKING IN HOSPITAL ROOM: A LOT
PERSONAL GROOMING: A LOT
STANDING UP FROM CHAIR USING ARMS: A LOT
MOVING TO AND FROM BED TO CHAIR: A LOT
TOILETING: A LOT
DRESSING REGULAR LOWER BODY CLOTHING: A LOT
CLIMB 3 TO 5 STEPS WITH RAILING: TOTAL
MOVING TO AND FROM BED TO CHAIR: A LOT
HELP NEEDED FOR BATHING: A LOT
WALKING IN HOSPITAL ROOM: A LOT
CLIMB 3 TO 5 STEPS WITH RAILING: TOTAL
EATING MEALS: A LOT
TOILETING: A LOT
DRESSING REGULAR UPPER BODY CLOTHING: A LOT
STANDING UP FROM CHAIR USING ARMS: A LOT
TOILETING: A LOT
MOVING TO AND FROM BED TO CHAIR: A LOT
STANDING UP FROM CHAIR USING ARMS: A LOT
PERSONAL GROOMING: A LOT
MOVING TO AND FROM BED TO CHAIR: A LOT
MOVING FROM LYING ON BACK TO SITTING ON SIDE OF FLAT BED WITH BEDRAILS: A LOT
CLIMB 3 TO 5 STEPS WITH RAILING: TOTAL
STANDING UP FROM CHAIR USING ARMS: A LOT
TOILETING: A LOT

## 2024-06-22 ASSESSMENT — PAIN - FUNCTIONAL ASSESSMENT: PAIN_FUNCTIONAL_ASSESSMENT: 0-10

## 2024-06-22 ASSESSMENT — PAIN SCALES - GENERAL: PAINLEVEL_OUTOF10: 0 - NO PAIN

## 2024-06-22 NOTE — PROGRESS NOTES
[facility]  ACUTE CARE SURGERY - PROGRESS NOTE    Patient Name: Enrike Crespo  MRN: [id number]  Admit Date: 613  : 1931  AGE: 89 y.o.   GENDER: male  ==============================================================================  TODAY'S ASSESSMENT AND PLAN OF CARE:  Enrike Crespo is a 89 y.o. male with PMH sick sinus syndrome status post pacemaker, hypertension, seizure, DM, afib, hyperthyroidism, PE, HF, cardiac arrest with defibrillator, stroke, sebaceous cysts, who presented for SBO 2/2 possible internal hernia.     GGC w/ SBFT  showed contrast retained in the stomach which was subsequently suctioned out from NGT after back on suction for increasing oxygen requirement. Abdominal exam is unchanged. Patient had about 4.8L out from NGT with new SHERLYN. Increased IVF to 100mL/hr but due to concern of heart failure and increased oxygen requirement, will hold off any any fluid boluses. Creatinine is down trending today. Still requiring 3L NC for SpO2 >90%. Not having consistent flatus or bowel movements but NGT output is decreased. Plan for CT A/P non con (concern for SHERLYN) with oral contrast and diagnostic and therapeutic for SBO. Also will give an enema.  CT findings concerning for a closed loop small bowel obstruction likely secondary to a pericecal internal hernia. Patient taken to the OR on .    OR : exploratory laparotomy, lysis of adhesions, small bowel resection with anastomosis, closure   Patient at high risk post op for aspiration and pneumonia. Poor respiratory effort at baseline. Aggressive bronchopulmonary hygiene, RT consult, IS, etc. No hard signs of ROBF yet, will continue NGT until flatus or BM.     At 6AM patient comfortable however with increased O2 requirement to 5L, increased leukocytosis, and chest x ray with interval worsening. Code mattie called few hours later for desat to 80s, patient increased to 15L and transferred to ICU for acute  hypoxic respiratory distress. Called family who are aware of plan, they are in agreement. ABG ordered, plan is for CT after transfer to ICU for evaluate of pneumonia or other pathology.     Neuro: acute postop pain  - IV Dilaudid [id number] PRN     CV: Hx of Sick sinus s/p pacemaker, HTN, afib, HF  - continue home metoprolol via IV while NPO  - continue home statin  - holding home Entresto  - holding home Eliquis and Plavix - hep gtt. Therapeutic. Will evaluate for plavix restart.   - continuous tele monitoring     Pulm  - wean NC as tolerated  - RT  - Continue Duo-Neb  - aggressive bronchopulmonary hygiene  - 10x per hour IS  - upright in bed 45 degrees     GI  - NPO  - NGT to LIWS   - PPI BID  - Severe Malnutrition 2/2 prolonged NPO status- nutrition consult for TPN recs   - Start on Thiamine 100mg for 7 days   - TPN at goal     : Hx of urinary retention  - Strict I&Os,   - holding home finasteride while NPO   - continue IVFs   - replete lytes as indicated   - SHERLYN resolved      Heme: hx of PE  - holding home Eliquis - on hep gtt  - Trend H/H daily, transfuse for Hgb <7    Endo: Hx of DM, hyperparathyroidism   - continue home PTU  - Continue home Lantus at half dose 7units qAM  - Continue blood glucose checks q6 hours and SSI     Skin  - Triamcinolone cream - home med      ID  - daily CBC  - CTX for UTI - discontinued on 6/20  - PICC line for parenteral nutrition   - leukocytosis to 17 today, can consider empiric abx for HAP     Dispo: transfer to ICU, not medically ready     Patient seen and discussed with Attending Dr. Manish Lewis MD  Acute Care Surgery   [id number]    CHIEF COMPLAINT / EVENTS LAST 24HRS / HPI:  At 6AM patient comfortable however with increased O2 requirement to 5L, increased leukocytosis, and chest x ray with interval worsening. Code white called few hours later for desat, patient increased to 15L and transferred to ICU for acute hypoxic respiratory distress. Called family who are aware of  plan, they are in agreement. ABG ordered, plan is for CT after transfer to ICU for evaluate of pneumonia or other pathology.     PHYSICAL EXAM:  Heart Rate:  [58-84]   Temp:  [35.9 °C (96.6 °F)-37.2 °C (99 °F)]   Resp:  [17-19]   BP: (116-136)/(50-75)   SpO2:  [87 %-99 %]   Constitutional: no acute distress, old man, thin, frail, soft spoken, hard of hearing  Neuro: A/O x4, no gross deficits   Psych: normal affect  HEENT: No deformities, no scleral icterus   Cardiac: RRR  Pulmonary: increased work of breathing, decreased breath sounds bilaterally, shallow breaths, equal chest rise, on 5L  Abdomen: soft, non distended, non tender  Skin: warm and dry overall    Extremities: no swelling noted  MSK: moving all four      IMAGING SUMMARY:  (summary of new imaging findings, not a copy of dictation)  NA    LABS:  Results from last 7 days   Lab Units 06/22/24  0500 06/21/24  0531 06/20/24  0510   WBC AUTO x10*3/uL 17.0* 12.3* 9.2   HEMOGLOBIN g/dL 10.5* 10.1* 10.5*   HEMATOCRIT % 33.0* 30.1* 33.6*   PLATELETS AUTO x10*3/uL 160 139* 151     Results from last 7 days   Lab Units 06/19/24  1116   APTT seconds 31   INR  1.2*     Results from last 7 days   Lab Units 06/22/24  0500 06/21/24  0531 06/20/24  0510   SODIUM mmol/L 141 144 146*   POTASSIUM mmol/L 3.9 3.5 3.7   CHLORIDE mmol/L 102 104 105   CO2 mmol/L 30 32 33*   BUN mg/dL 26* 32* 29*   CREATININE mg/dL 0.71 0.94 1.13   CALCIUM mg/dL 7.9* 7.9* 7.8*   GLUCOSE mg/dL 284* 258* 203*           Results from last 7 days   Lab Units 06/22/24  1102 06/22/24  0859   POCT PH, ARTERIAL pH 7.47* 7.49*   POCT PCO2, ARTERIAL mm Hg 42 41   POCT PO2, ARTERIAL mm Hg 68* 63*   POCT HCO3 CALCULATED, ARTERIAL mmol/L 30.6* 31.2*   POCT BASE EXCESS, ARTERIAL mmol/L 6.3* 7.2*       I have reviewed all medications, laboratory results, and imaging pertinent for today's encounter.

## 2024-06-22 NOTE — SIGNIFICANT EVENT
06/22/24 0827   Onset Documentation   Rapid Response Initiated By Radar auto page   Location/Room [facility] [place]  (LT [id number])   Pager Time [phone]   Arrival Time 0827   Event End Time 0930   Level II Called No   Primary Reason for Call SpO2 less than 90%     Rapid Response Note    Radar auto-page received for a radar score of 6 with the following vital signs: 35.9, 65, 17, 124/75, 90%.  Verified SpO2 at bedside - only saturating at 87% on 5 liters.  Rapid RT came to bedside.      Upon examination breath sounds clear and patient with regular respiratory pattern.  Secretions audible in upper airway.  Oral and nasopharyngeal suctioning performed with no improvement in saturations.    Patient titrated to 15 liters High Flow with resultant saturations of 92%.    ABG obtained: 7.49, 41, 63, 94%, Lactate 1.9, HCO3 31.2    Primary team came to bedside.  [facility]ICU contacted regarding escalation of care.  Accepted by [facility].    Patient transferred, on 100% nonrebreather, to [facility]ICU Bed 4.  Updates provided to ICU team.

## 2024-06-22 NOTE — CONSULTS
Vancomycin Dosing by Pharmacy- INITIAL    Enrike Crespo is a 89 y.o. year old male who Pharmacy has been consulted for vancomycin dosing for pneumonia. Based on the patient's indication and renal status this patient will be dosed based on a goal AUC of 400-600.     Renal function is currently stable.    Visit Vitals  /75 (BP Location: Right arm, Patient Position: Lying)   Pulse 81   Temp 36.8 °C (98.2 °F) (Temporal)   Resp 19        Lab Results   Component Value Date    CREATININE 0.71 2024    CREATININE 0.94 2024    CREATININE 1.13 2024    CREATININE 1.09 2024        Patient weight is as follows:   Vitals:    24 0004   Weight: 75.2 kg (165 lb 12.6 oz)       Cultures:  No results found for the encounter in last 14 days.        I/O last 3 completed shifts:  In: 1240.4 (16.5 mL/kg) [I.V.:199.4 (2.7 mL/kg); IV Piggyback:195]  Out: 3275 (43.6 mL/kg) [Urine:2325 (0.9 mL/kg/hr); Emesis/NG output:950]  Weight: 75.2 kg   I/O during current shift:  I/O this shift:  In: 297.3 [I.V.:297.3]  Out: 1200 [Urine:1200]    Temp (24hrs), Av.7 °C (98 °F), Min:35.9 °C (96.6 °F), Max:37.2 °C (99 °F)         Assessment/Plan     Patient will be given a loading dose of 2000 mg.  Will initiate vancomycin maintenance,  1500 mg every 24 hours.    This dosing regimen is predicted by [employer] to result in the following pharmacokinetic parameters:    Loading dose: 2000 mg at 16:00 2024.  Regimen: 1500 mg IV every 24 hours.  Start time: 16:00 on 2024  Exposure target: AUC24 (range)400-600 mg/L.hr   AUC24,ss: 508 mg/L.hr  Probability of AUC24 > 400: 76 %  Ctrough,ss: 14.3 mg/L  Probability of Ctrough,ss > 20: 23 %  Probability of nephrotoxicity (Lodise KAROLINE ): 9 %    Follow-up level will be ordered on  at 1st am lab unless clinically indicated sooner.  Will continue to monitor renal function daily while on vancomycin and order serum creatinine at least every 48 hours if not already  ordered.  Follow for continued vancomycin needs, clinical response, and signs/symptoms of toxicity.       Kvng Bustos, GemmaD

## 2024-06-22 NOTE — ACP (ADVANCE CARE PLANNING)
Code Status    Changed to: DNR-CCA    Spoke at length with pt's daughter and son who produced a document, signed January 2023, reflecting the patient's desire to be DNR-CCA. Code status and its details were discussed and confirmed with the family.    Humberto Sanchez MD  General Surgery PGY4  Trauma [id number]

## 2024-06-22 NOTE — H&P
[facility]  TRAUMA ICU - HISTORY AND PHYSICAL    Patient Name: Enrike Crespo MRN: [id number]  Admit Date: 613  : 1931  AGE: 89 y.o.   GENDER: male    ==============================================================================  ASSESSMENT AND PLAN:  89 yo M with multiple medical comorbidities including SSS s/p atrial paced PPM, prior cardiac arrest, HF, PE, Afib on Eliquis, DM, hyperthyroidism, and HTN, who was initially admitted for a high-grade SBO and on  underwent ex lap with two small bowel resections for a closed loop obstruction, newly transferred to ICU for increase work of breathing and rising O2 requirements. Rising leukocytosis. CXR with worsening LLL consolidation, suggesting infection superimposed on compressive atelectasis and mild effusion. Course further complicated by ongoing ileus.    NEURO:  - IV tylenol  - IV dilaudid prn  - holding PO analgesia for now    RESP:  - maintain SpO2 >88%  - encourage IS, deep breathing  - scheduled albuterol nebulizers  - chest physiotherapy  - treat for hospital acquired pneumonia (see ID section)  - AM CXR    CARD:  - Continuous cardiac monitoring  - IV metoprolol (in place of home dose)  - holding home statin  - holding home entresto  - anticoagulate for Afib (see Hematology section)  - Maintain MAP >65  - CT Chest    GI/FEN:  - NPO for now  - TPN 83 ml/hr  - continue thiamine (for severe malnutrition) for 7 days  - holding tube feeds  - CT A/P with IV and enteral contrast (through the NGT)  - Strict I/O    RENAL:  - no mina catheter  - UOP aim 0.5-1.0 ml/kg/hr  - holding home finasteride until able to take PO    HEME:  - no transfusion needs at this time  - continue heparin gtt for Afib (normally takes Eliquis)    ID:  - vancomycin and zosyn for empiric coverage of hospital acquired pneumonia ( - ___)  - MRSA swab; if negative, will d/c vancomycin    MSK:  - PT/OT  - Padding to protect pressure  points    ENDO:  - increase to SSI #3  - increase lantus to 15 units  - POCT q4h for now; once hyperglycemia resolved, will liberalize frequency of checks    PPX:  - SCDs  - already anticoagulated    TUBES/LINES:  - PIV  - PICC line    DISPO: Remain in ICU    Humberto Sanchez MD  General Surgery PGY4  TSICU [id number]    ==============================================================================    CHIEF COMPLAINT:  Increased work of breathing, rising O2 requirements    HISTORY OF PRESENT ILLNESS:  89 yo M with multiple cardiac and other medical comorbidities initially presented to [facility] with a high grade SBO. On 6/14, he underwent gastrografin challenge with SBFT which reconfirmed transition point. On 6/18, he ultimately underwent an exploratory laparotomy, lysis of adhesions, and small bowel resection with anastomosis x2. His post-op course was complicated by poor respiratory effort and ileus. Early this morning, his O2 requirements increased and he was found to have a worsening leukocytosis. Code White was called for desaturation to 80s. Pt was placed on high flow nasal cannula and transferred to ICU for acute hypoxic respiratory distress.    PAST MEDICAL HISTORY:  Past Medical History:   Diagnosis Date    Bleeding hemorrhoid     Cardiac arrest (Multi)     Diabetes (Multi)     Hearing aid worn     Heart failure (Multi)     HL (hearing loss)    SSS s/p atrial paced PPM  Prior cardiac arrest requiring defibrillation  HF  PE  Afib on Eliquis  DM  Hyperthyroidism  HTN    PAST SURGICAL HISTORY:  PPM placement  Ex lap, FREEDOM, SBR    PAST SOCIAL HISTORY:  Prolonged/active smoking history (75 pack-year)  Denies EtOH use  Denies use of illicit or recreational drugs    PAST FAMILY HISTORY:  Mother: heart disease    HOME MEDICATIONS:  Prior to Admission medications    Medication Sig Start Date End Date Taking? Authorizing Provider   apixaban (Eliquis) 5 mg tablet Take 1 tablet (5 mg) by mouth twice a day. 12/30/19   Historical  Provider, MD   clopidogrel (Plavix) 75 mg tablet Take 1 tablet (75 mg) by mouth once daily. 10/10/23   Historical Provider, MD   ferrous sulfate, 325 mg ferrous sulfate, tablet Take 1 tablet by mouth once daily with breakfast. 12/13/22   Historical Provider, MD   finasteride (Proscar) 5 mg tablet Take 1 tablet (5 mg) by mouth once daily. 12/5/23   Historical Provider, MD   glipiZIDE XL (Glucotrol XL) 2.5 mg 24 hr tablet Take 1 tablet (2.5 mg) by mouth once daily.    Historical Provider, MD   insulin glargine (Lantus) 100 unit/mL injection Inject 15 Units under the skin once daily in the morning. 2/8/19   Historical Provider, MD   insulin glargine-yfgn 100 unit/mL (3 mL) Pen Inject 14 Units under the skin once daily in the morning. 7/18/23   Historical Provider, MD   metoprolol succinate XL (Toprol-XL) 25 mg 24 hr tablet Take 0.5 tablets (12.5 mg) by mouth once daily. 10/2/23 1/29/24  Historical Provider, MD   pravastatin (Pravachol) 20 mg tablet Take 1 tablet (20 mg) by mouth once daily. 12/27/23   Historical Provider, MD   propylthiouracil (PTU) 50 mg tablet Take 1 tablet (50 mg) by mouth 3 times a day. 12/27/23   Historical Provider, MD   sacubitriL-valsartan (Entresto) 24-26 mg tablet Take 1 tablet by mouth 2 times a day. 6/29/23 1/29/24  Historical Provider, MD   triamcinolone (Kenalog) 0.1 % cream Apply topically 2 times a day. Apply to affected area 1-2 times daily as needed. 1/29/24   Rosario Dumas MD       ALLERGIES  Penicillins    REVIEW OF SYSTEMS:  14-point ROS performed; negative unless otherwise indicated in HPI.    ==============================================================================    VITALS:  Vitals:    06/22/24 1200   BP:    Pulse:    Resp:    Temp: 36.8 °C (98.2 °F)   SpO2:        PHYSICAL EXAM:  Gen:  Mildly distressed  HEENT:  Atraumatic, normocephalic    NGT in place  Eyes:  No scleral icterus  Card:  RRR  Resp:  Mildly labored breathing on high flow nasal cannula  Abd:  Soft,  non-tender, non-distended    Midline wound stapled, no drainage, no erythema  Ext:  WWP, no swelling of BLE  MSK:  GOLDEN  Neuro:  AOx3, no gross neurological deficits  Skin:  Warm and dry    IMAGING SUMMARY:  CXR 6/22: worsening consolidation L base possibly representing infection superimposed on atelectasis +/- effusion. Lesser effusion/consolidation in R base.    LABS:  Results for orders placed or performed during the hospital encounter of 06/13/24 (from the past 24 hour(s))   POCT GLUCOSE   Result Value Ref Range    POCT Glucose 277 (H) 74 - 99 mg/dL   POCT GLUCOSE   Result Value Ref Range    POCT Glucose 309 (H) 74 - 99 mg/dL   POCT GLUCOSE   Result Value Ref Range    POCT Glucose 276 (H) 74 - 99 mg/dL   CBC   Result Value Ref Range    WBC 17.0 (H) 4.4 - 11.3 x10*3/uL    nRBC 0.0 0.0 - 0.0 /100 WBCs    RBC 3.41 (L) 4.50 - 5.90 x10*6/uL    Hemoglobin 10.5 (L) 13.5 - 17.5 g/dL    Hematocrit 33.0 (L) 41.0 - 52.0 %    MCV 97 80 - 100 fL    MCH 30.8 26.0 - 34.0 pg    MCHC 31.8 (L) 32.0 - 36.0 g/dL    RDW 12.5 11.5 - 14.5 %    Platelets 160 150 - 450 x10*3/uL   Magnesium   Result Value Ref Range    Magnesium 1.54 (L) 1.60 - 2.40 mg/dL   Renal function panel   Result Value Ref Range    Glucose 284 (H) 74 - 99 mg/dL    Sodium 141 136 - 145 mmol/L    Potassium 3.9 3.5 - 5.3 mmol/L    Chloride 102 98 - 107 mmol/L    Bicarbonate 30 21 - 32 mmol/L    Anion Gap 13 10 - 20 mmol/L    Urea Nitrogen 26 (H) 6 - 23 mg/dL    Creatinine 0.71 0.50 - 1.30 mg/dL    eGFR 86 >60 mL/min/1.73m*2    Calcium 7.9 (L) 8.6 - 10.6 mg/dL    Phosphorus 1.7 (L) 2.5 - 4.9 mg/dL    Albumin 2.4 (L) 3.4 - 5.0 g/dL   Heparin Assay, UFH   Result Value Ref Range    Heparin Unfractionated 0.2 See Comment Below for Therapeutic Ranges IU/mL   Blood Gas Arterial Full Panel   Result Value Ref Range    POCT pH, Arterial 7.49 (H) 7.38 - 7.42 pH    POCT pCO2, Arterial 41 38 - 42 mm Hg    POCT pO2, Arterial 63 (L) 85 - 95 mm Hg    POCT SO2, Arterial 94 94 - 100 %     POCT Oxy Hemoglobin, Arterial 92.7 (L) 94.0 - 98.0 %    POCT Hematocrit Calculated, Arterial 35.0 (L) 41.0 - 52.0 %    POCT Sodium, Arterial 137 136 - 145 mmol/L    POCT Potassium, Arterial 3.9 3.5 - 5.3 mmol/L    POCT Chloride, Arterial 102 98 - 107 mmol/L    POCT Ionized Calcium, Arterial 1.17 1.10 - 1.33 mmol/L    POCT Glucose, Arterial 326 (H) 74 - 99 mg/dL    POCT Lactate, Arterial 1.9 0.4 - 2.0 mmol/L    POCT Base Excess, Arterial 7.2 (H) -2.0 - 3.0 mmol/L    POCT HCO3 Calculated, Arterial 31.2 (H) 22.0 - 26.0 mmol/L    POCT Hemoglobin, Arterial 11.5 (L) 13.5 - 17.5 g/dL    POCT Anion Gap, Arterial 8 (L) 10 - 25 mmo/L    Patient Temperature      FiO2 100 %   POCT GLUCOSE   Result Value Ref Range    POCT Glucose 312 (H) 74 - 99 mg/dL   Electrocardiogram, 12-lead PRN ACS symptoms   Result Value Ref Range    Ventricular Rate 90 BPM    Atrial Rate 86 BPM    IA Interval 238 ms    QRS Duration 108 ms    QT Interval 418 ms    QTC Calculation(Bazett) 511 ms    P Axis 145 degrees    R Axis -41 degrees    T Axis -5 degrees    QRS Count 15 beats    Q Onset 226 ms    P Onset 107 ms    P Offset 170 ms    T Offset 435 ms    QTC Fredericia 478 ms   Blood Gas Arterial Full Panel   Result Value Ref Range    POCT pH, Arterial 7.47 (H) 7.38 - 7.42 pH    POCT pCO2, Arterial 42 38 - 42 mm Hg    POCT pO2, Arterial 68 (L) 85 - 95 mm Hg    POCT SO2, Arterial 95 94 - 100 %    POCT Oxy Hemoglobin, Arterial 93.0 (L) 94.0 - 98.0 %    POCT Hematocrit Calculated, Arterial 33.0 (L) 41.0 - 52.0 %    POCT Sodium, Arterial 136 136 - 145 mmol/L    POCT Potassium, Arterial 3.7 3.5 - 5.3 mmol/L    POCT Chloride, Arterial 101 98 - 107 mmol/L    POCT Ionized Calcium, Arterial 1.20 1.10 - 1.33 mmol/L    POCT Glucose, Arterial 358 (H) 74 - 99 mg/dL    POCT Lactate, Arterial 1.6 0.4 - 2.0 mmol/L    POCT Base Excess, Arterial 6.3 (H) -2.0 - 3.0 mmol/L    POCT HCO3 Calculated, Arterial 30.6 (H) 22.0 - 26.0 mmol/L    POCT Hemoglobin, Arterial 11.0 (L)  13.5 - 17.5 g/dL    POCT Anion Gap, Arterial 8 (L) 10 - 25 mmo/L    Patient Temperature 37.0 degrees Celsius    FiO2 60 %    Apparatus HIGH FLOW CANNULA     Site of Arterial Puncture Brachial Right     Abdirahman's Test Positive    POCT GLUCOSE   Result Value Ref Range    POCT Glucose 353 (H) 74 - 99 mg/dL   Heparin Assay   Result Value Ref Range    Heparin Unfractionated 0.2 See Comment Below for Therapeutic Ranges IU/mL       I have reviewed all laboratory and imaging results ordered/pertinent for this encounter.

## 2024-06-23 LAB
ALBUMIN SERPL BCP-MCNC: 2.1 G/DL (ref 3.4–5)
ALBUMIN SERPL BCP-MCNC: 2.4 G/DL (ref 3.4–5)
ANION GAP SERPL CALC-SCNC: 11 MMOL/L (ref 10–20)
ANION GAP SERPL CALC-SCNC: 9 MMOL/L (ref 10–20)
APTT PPP: 53 SECONDS (ref 27–38)
APTT PPP: 72 SECONDS (ref 27–38)
ATRIAL RATE: 86 BPM
BUN SERPL-MCNC: 28 MG/DL (ref 6–23)
BUN SERPL-MCNC: 32 MG/DL (ref 6–23)
CA-I BLD-SCNC: 1.12 MMOL/L (ref 1.1–1.33)
CA-I BLD-SCNC: 1.17 MMOL/L (ref 1.1–1.33)
CALCIUM SERPL-MCNC: 7.5 MG/DL (ref 8.6–10.6)
CALCIUM SERPL-MCNC: 8 MG/DL (ref 8.6–10.6)
CHLORIDE SERPL-SCNC: 100 MMOL/L (ref 98–107)
CHLORIDE SERPL-SCNC: 99 MMOL/L (ref 98–107)
CO2 SERPL-SCNC: 30 MMOL/L (ref 21–32)
CO2 SERPL-SCNC: 30 MMOL/L (ref 21–32)
CREAT SERPL-MCNC: 0.84 MG/DL (ref 0.5–1.3)
CREAT SERPL-MCNC: 0.91 MG/DL (ref 0.5–1.3)
EGFRCR SERPLBLD CKD-EPI 2021: 79 ML/MIN/1.73M*2
EGFRCR SERPLBLD CKD-EPI 2021: 81 ML/MIN/1.73M*2
ERYTHROCYTE [DISTWIDTH] IN BLOOD BY AUTOMATED COUNT: 12.7 % (ref 11.5–14.5)
ERYTHROCYTE [DISTWIDTH] IN BLOOD BY AUTOMATED COUNT: 12.7 % (ref 11.5–14.5)
GLUCOSE BLD MANUAL STRIP-MCNC: 174 MG/DL (ref 74–99)
GLUCOSE BLD MANUAL STRIP-MCNC: 207 MG/DL (ref 74–99)
GLUCOSE BLD MANUAL STRIP-MCNC: 237 MG/DL (ref 74–99)
GLUCOSE BLD MANUAL STRIP-MCNC: 240 MG/DL (ref 74–99)
GLUCOSE BLD MANUAL STRIP-MCNC: 264 MG/DL (ref 74–99)
GLUCOSE SERPL-MCNC: 166 MG/DL (ref 74–99)
GLUCOSE SERPL-MCNC: 217 MG/DL (ref 74–99)
HCT VFR BLD AUTO: 26 % (ref 41–52)
HCT VFR BLD AUTO: 30.5 % (ref 41–52)
HGB BLD-MCNC: 10 G/DL (ref 13.5–17.5)
HGB BLD-MCNC: 8.3 G/DL (ref 13.5–17.5)
INR PPP: 1.2 (ref 0.9–1.1)
INR PPP: 1.3 (ref 0.9–1.1)
MAGNESIUM SERPL-MCNC: 1.88 MG/DL (ref 1.6–2.4)
MAGNESIUM SERPL-MCNC: 2.01 MG/DL (ref 1.6–2.4)
MCH RBC QN AUTO: 30.6 PG (ref 26–34)
MCH RBC QN AUTO: 30.9 PG (ref 26–34)
MCHC RBC AUTO-ENTMCNC: 31.9 G/DL (ref 32–36)
MCHC RBC AUTO-ENTMCNC: 32.8 G/DL (ref 32–36)
MCV RBC AUTO: 93 FL (ref 80–100)
MCV RBC AUTO: 97 FL (ref 80–100)
NRBC BLD-RTO: 0 /100 WBCS (ref 0–0)
NRBC BLD-RTO: 0 /100 WBCS (ref 0–0)
P AXIS: 145 DEGREES
P OFFSET: 170 MS
P ONSET: 107 MS
PHOSPHATE SERPL-MCNC: 2.4 MG/DL (ref 2.5–4.9)
PHOSPHATE SERPL-MCNC: 3.3 MG/DL (ref 2.5–4.9)
PLATELET # BLD AUTO: 137 X10*3/UL (ref 150–450)
PLATELET # BLD AUTO: 147 X10*3/UL (ref 150–450)
POTASSIUM SERPL-SCNC: 3.8 MMOL/L (ref 3.5–5.3)
POTASSIUM SERPL-SCNC: 3.8 MMOL/L (ref 3.5–5.3)
PR INTERVAL: 238 MS
PROTHROMBIN TIME: 13.8 SECONDS (ref 9.8–12.8)
PROTHROMBIN TIME: 14.3 SECONDS (ref 9.8–12.8)
Q ONSET: 226 MS
QRS COUNT: 15 BEATS
QRS DURATION: 108 MS
QT INTERVAL: 418 MS
QTC CALCULATION(BAZETT): 511 MS
QTC FREDERICIA: 478 MS
R AXIS: -41 DEGREES
RBC # BLD AUTO: 2.69 X10*6/UL (ref 4.5–5.9)
RBC # BLD AUTO: 3.27 X10*6/UL (ref 4.5–5.9)
SODIUM SERPL-SCNC: 135 MMOL/L (ref 136–145)
SODIUM SERPL-SCNC: 136 MMOL/L (ref 136–145)
STAPHYLOCOCCUS SPEC CULT: NORMAL
T AXIS: -5 DEGREES
T OFFSET: 435 MS
TSH SERPL-ACNC: 0.72 MIU/L (ref 0.44–3.98)
UFH PPP CHRO-ACNC: 0.3 IU/ML
UFH PPP CHRO-ACNC: 0.3 IU/ML
VENTRICULAR RATE: 90 BPM
WBC # BLD AUTO: 17.3 X10*3/UL (ref 4.4–11.3)
WBC # BLD AUTO: 20.9 X10*3/UL (ref 4.4–11.3)

## 2024-06-23 PROCEDURE — 94660 CPAP INITIATION&MGMT: CPT

## 2024-06-23 PROCEDURE — 2500000005 HC RX 250 GENERAL PHARMACY W/O HCPCS: Performed by: STUDENT IN AN ORGANIZED HEALTH CARE EDUCATION/TRAINING PROGRAM

## 2024-06-23 PROCEDURE — 2500000004 HC RX 250 GENERAL PHARMACY W/ HCPCS (ALT 636 FOR OP/ED): Mod: JZ | Performed by: EMERGENCY MEDICINE

## 2024-06-23 PROCEDURE — 94668 MNPJ CHEST WALL SBSQ: CPT

## 2024-06-23 PROCEDURE — 84443 ASSAY THYROID STIM HORMONE: CPT | Performed by: STUDENT IN AN ORGANIZED HEALTH CARE EDUCATION/TRAINING PROGRAM

## 2024-06-23 PROCEDURE — 2500000004 HC RX 250 GENERAL PHARMACY W/ HCPCS (ALT 636 FOR OP/ED): Performed by: STUDENT IN AN ORGANIZED HEALTH CARE EDUCATION/TRAINING PROGRAM

## 2024-06-23 PROCEDURE — 2500000005 HC RX 250 GENERAL PHARMACY W/O HCPCS: Performed by: NURSE PRACTITIONER

## 2024-06-23 PROCEDURE — 2500000002 HC RX 250 W HCPCS SELF ADMINISTERED DRUGS (ALT 637 FOR MEDICARE OP, ALT 636 FOR OP/ED): Performed by: STUDENT IN AN ORGANIZED HEALTH CARE EDUCATION/TRAINING PROGRAM

## 2024-06-23 PROCEDURE — 37799 UNLISTED PX VASCULAR SURGERY: CPT | Performed by: NURSE PRACTITIONER

## 2024-06-23 PROCEDURE — C9113 INJ PANTOPRAZOLE SODIUM, VIA: HCPCS | Performed by: STUDENT IN AN ORGANIZED HEALTH CARE EDUCATION/TRAINING PROGRAM

## 2024-06-23 PROCEDURE — 2500000004 HC RX 250 GENERAL PHARMACY W/ HCPCS (ALT 636 FOR OP/ED): Performed by: PHYSICIAN ASSISTANT

## 2024-06-23 PROCEDURE — 99291 CRITICAL CARE FIRST HOUR: CPT | Performed by: STUDENT IN AN ORGANIZED HEALTH CARE EDUCATION/TRAINING PROGRAM

## 2024-06-23 PROCEDURE — 85520 HEPARIN ASSAY: CPT | Performed by: STUDENT IN AN ORGANIZED HEALTH CARE EDUCATION/TRAINING PROGRAM

## 2024-06-23 PROCEDURE — 85610 PROTHROMBIN TIME: CPT | Mod: 91 | Performed by: STUDENT IN AN ORGANIZED HEALTH CARE EDUCATION/TRAINING PROGRAM

## 2024-06-23 PROCEDURE — 94640 AIRWAY INHALATION TREATMENT: CPT

## 2024-06-23 PROCEDURE — 2020000001 HC ICU ROOM DAILY

## 2024-06-23 PROCEDURE — 99024 POSTOP FOLLOW-UP VISIT: CPT | Performed by: SURGERY

## 2024-06-23 PROCEDURE — 82947 ASSAY GLUCOSE BLOOD QUANT: CPT

## 2024-06-23 PROCEDURE — 2500000002 HC RX 250 W HCPCS SELF ADMINISTERED DRUGS (ALT 637 FOR MEDICARE OP, ALT 636 FOR OP/ED): Performed by: PHYSICIAN ASSISTANT

## 2024-06-23 PROCEDURE — 80069 RENAL FUNCTION PANEL: CPT | Performed by: STUDENT IN AN ORGANIZED HEALTH CARE EDUCATION/TRAINING PROGRAM

## 2024-06-23 PROCEDURE — 82330 ASSAY OF CALCIUM: CPT | Performed by: NURSE PRACTITIONER

## 2024-06-23 PROCEDURE — 83735 ASSAY OF MAGNESIUM: CPT | Mod: 91 | Performed by: STUDENT IN AN ORGANIZED HEALTH CARE EDUCATION/TRAINING PROGRAM

## 2024-06-23 PROCEDURE — 37799 UNLISTED PX VASCULAR SURGERY: CPT | Performed by: STUDENT IN AN ORGANIZED HEALTH CARE EDUCATION/TRAINING PROGRAM

## 2024-06-23 PROCEDURE — 85520 HEPARIN ASSAY: CPT | Mod: 91 | Performed by: STUDENT IN AN ORGANIZED HEALTH CARE EDUCATION/TRAINING PROGRAM

## 2024-06-23 PROCEDURE — 2580000001 HC RX 258 IV SOLUTIONS: Performed by: STUDENT IN AN ORGANIZED HEALTH CARE EDUCATION/TRAINING PROGRAM

## 2024-06-23 PROCEDURE — 85027 COMPLETE CBC AUTOMATED: CPT | Mod: 91 | Performed by: STUDENT IN AN ORGANIZED HEALTH CARE EDUCATION/TRAINING PROGRAM

## 2024-06-23 PROCEDURE — 2500000004 HC RX 250 GENERAL PHARMACY W/ HCPCS (ALT 636 FOR OP/ED): Mod: JZ | Performed by: STUDENT IN AN ORGANIZED HEALTH CARE EDUCATION/TRAINING PROGRAM

## 2024-06-23 PROCEDURE — 31720 CLEARANCE OF AIRWAYS: CPT

## 2024-06-23 PROCEDURE — 2500000004 HC RX 250 GENERAL PHARMACY W/ HCPCS (ALT 636 FOR OP/ED): Performed by: NURSE PRACTITIONER

## 2024-06-23 PROCEDURE — 2500000001 HC RX 250 WO HCPCS SELF ADMINISTERED DRUGS (ALT 637 FOR MEDICARE OP): Performed by: STUDENT IN AN ORGANIZED HEALTH CARE EDUCATION/TRAINING PROGRAM

## 2024-06-23 PROCEDURE — 2500000002 HC RX 250 W HCPCS SELF ADMINISTERED DRUGS (ALT 637 FOR MEDICARE OP, ALT 636 FOR OP/ED): Performed by: EMERGENCY MEDICINE

## 2024-06-23 RX ORDER — INSULIN LISPRO 100 [IU]/ML
0-15 INJECTION, SOLUTION INTRAVENOUS; SUBCUTANEOUS EVERY 6 HOURS
Status: ACTIVE | OUTPATIENT
Start: 2024-06-23

## 2024-06-23 RX ORDER — OLANZAPINE 5 MG/1
5 TABLET, ORALLY DISINTEGRATING ORAL NIGHTLY
Status: DISPENSED | OUTPATIENT
Start: 2024-06-23

## 2024-06-23 RX ORDER — MAGNESIUM SULFATE HEPTAHYDRATE 40 MG/ML
2 INJECTION, SOLUTION INTRAVENOUS ONCE
Status: COMPLETED | OUTPATIENT
Start: 2024-06-23 | End: 2024-06-23

## 2024-06-23 ASSESSMENT — PAIN - FUNCTIONAL ASSESSMENT
PAIN_FUNCTIONAL_ASSESSMENT: 0-10

## 2024-06-23 ASSESSMENT — ENCOUNTER SYMPTOMS
MUSCULOSKELETAL NEGATIVE: 1
HEMATOLOGIC/LYMPHATIC NEGATIVE: 1
ENDOCRINE NEGATIVE: 1
NEUROLOGICAL NEGATIVE: 1
ABDOMINAL PAIN: 1
SHORTNESS OF BREATH: 1
ALLERGIC/IMMUNOLOGIC NEGATIVE: 1
EYES NEGATIVE: 1
CONSTITUTIONAL NEGATIVE: 1
PSYCHIATRIC NEGATIVE: 1

## 2024-06-23 ASSESSMENT — PAIN SCALES - GENERAL
PAINLEVEL_OUTOF10: 0 - NO PAIN

## 2024-06-23 NOTE — CONSULTS
"Reason For Consult  3.3cm RUL mass    History Of Present Illness  Enrike Crespo is a 89 y.o. male presenting with RUL mass. +SOB.     Past Medical History  He has a past medical history of Bleeding hemorrhoid, Cardiac arrest (Multi), Diabetes (Multi), Hearing aid worn, Heart failure (Multi), and HL (hearing loss).    Surgical History  He has a past surgical history that includes Cardiac defibrillator placement.     Social History  He reports that he has been smoking cigarettes. He has a 75 pack-year smoking history. He has never used smokeless tobacco. He reports that he does not currently use alcohol. He reports that he does not use drugs.    Family History  Family History   Problem Relation Name Age of Onset    Heart disease Mother          Allergies  Penicillins    Review of Systems  Review of Systems   Constitutional: Negative.    HENT: Negative.     Eyes: Negative.    Respiratory:  Positive for shortness of breath.    Cardiovascular:  Negative for chest pain.   Gastrointestinal:  Positive for abdominal pain.   Endocrine: Negative.    Genitourinary: Negative.    Musculoskeletal: Negative.    Skin: Negative.    Allergic/Immunologic: Negative.    Neurological: Negative.    Hematological: Negative.    Psychiatric/Behavioral: Negative.            Physical Exam  General: He is a pleasant elderly male .  /86   Pulse 82   Temp 36.7 °C (98.1 °F) (Temporal)   Resp 23   Ht 1.753 m (5' 9\")   Wt 75.2 kg (165 lb 12.6 oz)   SpO2 93%   BMI 24.48 kg/m²    Body mass index is 24.48 kg/m².   HEENT: Normocephalic and atraumatic.   NECK: Supple. Trach midline. No JVD.   CHEST: tachypneic on AIRVO  HEART: Regular rate on monitor.   ABDOMEN: nondistended  : mina  NEUROLOGIC: Alert and oriented. Grossly intact.   EXTREMITIES: Moves all extremities equally.  Pedal pulses are palpable. No lower extremity edema. No calf tenderness.       Last Recorded Vitals  Blood pressure 143/86, pulse 82, temperature 36.7 °C (98.1 °F), " "temperature source Temporal, resp. rate 23, height 1.753 m (5' 9\"), weight 75.2 kg (165 lb 12.6 oz), SpO2 93%.    Relevant Results   Electrocardiogram, 12-lead PRN ACS symptoms    Result Date: 6/23/2024   Likely Underlying atrial fibrilaltion Left axis deviation Pulmonary disease pattern Incomplete right bundle branch block Nonspecific ST and T wave abnormality Abnormal ECG Confirmed by Channing Gregory ([id number]) on 6/23/2024 12:41:48 PM    CT chest abdomen pelvis w IV contrast    Result Date: 6/23/2024  Interpreted By:  Dane Cedeño,  and Kandy Yin STUDY: CT CHEST ABDOMEN PELVIS W IV CONTRAST;  6/22/2024 6:35 pm   INDICATION: Signs/Symptoms:incr O2 req, c/f pna with underlying effusion?. 89-year-old male with history of multiple comorbidities including heart failure, PEs, AFib on Eliquis, diabetes initially admitted for high-grade small-bowel obstruction status post small bowel resection 06/18/2024 transferred to the ICU for increased work of breathing and worsening oxygen requirements, rising leukocytosis.   COMPARISON: CT abdomen pelvis 06/17/2024   ACCESSION NUMBER(S): [id number]   ORDERING CLINICIAN: ROSIE PENALOZA   TECHNIQUE: Contiguous axial images of the chest, abdomen, and pelvis were obtained after the intravenous administration of 75 mL Omnipaque 350 contrast. Coronal and sagittal reformatted images were reconstructed from the axial data.   FINDINGS: Limited evaluation due to beam hardening artifact from patient's upper extremities.   CT CHEST:   MEDIASTINUM AND LYMPH NODES: Multiple hypodense and partially calcified nodules are noted within the thyroid gland measuring up to 7 mm in the left thyroid lobe. No enlarged intrathoracic or axillary lymph nodes. No pneumomediastinum.   VESSELS: A peripherally inserted central line is seen coursing through the right axillary and subclavian veins terminating in the right atrium. The main pulmonary artery is dilated measuring up to 3.2 cm in diameter. " Normal caliber aorta without dissection. Moderate aortic atherosclerosis.   HEART: Left subclavian approach pacemaker/AICD with lead terminating within the right ventricle. Normal size. Mitral valve calcifications are noted. Severe coronary artery calcifications. No significant pericardial effusion.   LUNG, AIRWAYS, PLEURA: The trachea and mainstem bronchi are patent. Obliteration of multiple bilateral lower lobar segmental and subsegmental bronchi are noted.   Interval worsening bilateral lung aeration with bibasilar consolidative opacities with air bronchograms, left-greater-than-right. Worsening now small to moderate left and small right pleural effusions with associated atelectasis. Patchy dependent airspace opacities are noted within the bilateral upper and lower lobes. No pneumothorax. Biapical emphysematous changes are noted.   A 3.3 x 2.9 cm rounded consolidative opacity which demonstrates multiple foci of internal lucency at its superior aspect is noted in the anterior right upper lobe (series 204, image 104).   CHEST WALL SOFT TISSUES: Left chest wall pacemaker as above.   OSSEOUS STRUCTURES: No acute osseous abnormality.     CT ABDOMEN/PELVIS:   ABDOMINAL WALL: Postsurgical changes from prior midline laparotomy.   LIVER: No significant parenchymal abnormality.   BILE DUCTS: No significant intrahepatic or extrahepatic dilatation.   GALLBLADDER: No significant abnormality.   PANCREAS: No significant abnormality.   SPLEEN: No significant abnormality.   ADRENALS: No significant abnormality.   KIDNEYS, URETERS, BLADDER: Redemonstrated atrophy and lobulation of the right kidney with multiple nonobstructing renal calculi measuring up to 0.9 cm. No left nephroureterolithiasis. Redemonstrated 2.1 cm simple cyst at the left superior renal pole. No hydroureteronephrosis on either kidney. The urinary bladder is within normal limits for degree of distention.   REPRODUCTIVE ORGANS: No significant abnormality.    VESSELS: Severe atherosclerosis of the abdominal aorta and its branches without aneurysmal dilatation. The IVC is normal.   RETROPERITONEUM/LYMPH NODES: No enlarged lymph nodes.   BOWEL/MESENTERY/PERITONEUM: Enteric tube terminates in the gastric body. Stomach is collapsed, limited for evaluation. Postsurgical changes from small bowel resection with anastomoses in the left mid abdomen. There are again  proximal jejunal loops in the right side of the abdomen lateral to the ascending colon within the right paracolic gutter. These loops are nondilated and demonstrate no significant contrast in the lumen but appeared diffusely thickened without evidence of pneumatosis. The jejunal loops transition into the ileal loops in the left side of the abdomen which have contrast in the lumen. The ileal loops are prominent without significant dilatation. The anastomotic site is seen in the left mid abdomen. Oral contrast passes from the small bowel into the colon up to the level of the sigmoid colon.  Scattered colonic diverticula without evidence of acute diverticulitis. No inflammatory bowel wall thickening. Normal appendix.   Diffuse mesenteric edema is noted. Small to moderate volume pneumoperitoneum is noted along the anterior abdomen.   MUSCULOSKELETAL: Multilevel degenerative changes of the thoracolumbar spine are noted.       1. Interval worsening bilateral lung aeration with bibasilar consolidative opacities with air bronchograms, left-greater-than-right. Patchy dependent airspace opacities within the bilateral upper and lower lobes. Correlate clinically with multifocal pneumonia, possibly aspiration related given distribution. 2. Worsening now small to moderate left and small right pleural effusion with associated atelectasis. 3. A rounded consolidative opacity measuring up to 3.3 cm in the right upper lobe with foci of internal lucency. Findings are suspicious for neoplasm. Continued attention on follow-up and further  workup as clinically indicated. 4. Postsurgical changes from bowel resection with left upper quadrant anastomosis. Small to moderate volume pneumoperitoneum which may be postsurgical, however attention on follow-up is recommended. 5. Persistent of proximal jejunal loops in the right paracolic gutter which appear thickened without definite evidence of dilatation. Thickening can be reactive to the recent surgery. Unremarkable appearance of the small bowel anastomosis in the left mid abdomen with the free passage of contrast across these loops into the colon. No definite evidence of bowel obstruction. 6. Additional stable findings as described above.   I personally reviewed the images/study and I agree with the findings as stated by Annamaria Gaitan MD. This study was interpreted at [place], [place].   MACRO: None.   Signed by: Dane Cedeño 6/23/2024 9:38 AM Dictation workstation:   [id number]    XR chest 1 view    Result Date: 6/22/2024  Interpreted By:  Bj Del Real  and Kandy Yin STUDY: XR CHEST 1 VIEW;  6/22/2024 8:24 am   INDICATION: Signs/Symptoms:c/f pna.   COMPARISON: Chest radiograph 06/14/2024 and abdominopelvic CT scan 06/17/2024   ACCESSION NUMBER(S): [id number]   ORDERING CLINICIAN: ROSIE PENALOZA   FINDINGS: AP radiograph of the chest was provided. Right upper extremity PICC now seen in place with its tip projecting over the upper to mid right atrium. Left subclavian approach single chamber cardiac pacemaker/AICD with its lead projecting over the expected right ventricle.   CARDIOMEDIASTINAL SILHOUETTE: Cardiomediastinal silhouette is normal in size and configuration. Aortic knob calcifications are noted.   LUNGS: Previously noted focal right upper to midlung nodular density appear less conspicuous on present study. There has been interval increase in diffuse interstitial prominence with new hazy opacification along bilateral lung bases,  left more than right. There is no pneumothorax.   ABDOMEN: No remarkable upper abdominal findings.   BONES: No acute osseous changes.       1. Interval worsening of bibasilar lung aeration, left more than right, which may be due to increasing atelectasis and left-sided pleural effusion. Superimposed infectious process is not excluded. 2. Previously noted right upper to midlung nodular density appears less conspicuous on present study. Continued attention on follow-up imaging is recommended. 3. Medical devices as above.   I personally reviewed the images/study and I agree with the findings as stated by Annamaria Gaitan MD. This study was interpreted at [facility], [place], [place].   MACRO: None   Signed by: Bj Del Real 6/22/2024 11:00 AM Dictation workstation:   [id number]        Assessment/Plan   Enrike Crespo is a 89 yo M with multiple medical comorbidities and a 80 pk year smoking history who was initially admitted for a high-grade SBO and on 6/18 underwent ex lap with two small bowel resections for a closed loop obstruction, transferred to ICU on 6/22 for rising O2 requirements. 6/22 CT chest showed PNA and a 3.3cm RUL mass, for which Thoracic Surgery is consulted.   Patient currently on 10L airvo.    Spoke to daughter, Rina Crespo, who knew about the lesion after it was found on imaging 6-8 months ago at OSH. This imaging is not available through PACS currently. At that time and currently, family does not wish to pursue further workup or intervention of the right lung mass.     Recommendations:   - per daughter, lesion is known and there is no desire to pursue further workup or intervention at this time; if the HCPOA and patient change their mind and want to pursue workup of the mass, they may follow up with Thoracic Surgery in the outpatient setting.    I spent 30 minutes in the professional and overall care of this patient.    Discussed with Attending   Robert Gonzalez MD  General Surgery Resident  Thoracic Surgery [id number]

## 2024-06-23 NOTE — PROGRESS NOTES
[facility]  TRAUMA ICU - PROGRESS NOTE    Patient Name: Enrike Crespo  MRN: [id number]  Admit Date: 613  : 1931  AGE: 89 y.o.   GENDER: male    ==============================================================================  89 yo M with multiple medical comorbidities including SSS s/p atrial paced PPM, prior cardiac arrest, HF, PE, Afib on Eliquis, DM, hyperthyroidism, and HTN, who was initially admitted for a high-grade SBO and on  underwent ex lap with two small bowel resections for a closed loop obstruction, transferred to ICU on  for rising O2 requirements. Rising leukocytosis and CXR with worsening LLL consolidation, suggesting infection superimposed on compressive atelectasis and mild effusion. Course further complicated by ongoing ileus. Still requiring HFNC but stable.    NEURO:  - IV tylenol  - IV dilaudid prn  - holding PO analgesia in setting of ileus  - start zyprexa ODT nightly for delirium     RESP:  - maintain SpO2 >88%  - encourage IS, deep breathing  - scheduled albuterol nebulizers  - treat for hospital acquired pneumonia (see ID section)  - Thoracic Surgery consult for possible R lung neoplastic process     CARD:  - Continuous cardiac monitoring  - IV metoprolol (in place of home dose)  - holding home statin, entresto  - anticoagulate for Afib (see Hematology section)  - Maintain MAP >65     GI/FEN:  - NPO for now,  - awaiting return of bowel function  - TPN 83 ml/hr  - continue thiamine (for severe malnutrition) for 7 days  - holding tube feeds  - CT A/P with IV and enteral contrast (through the NGT)  - Strict I/O     RENAL:  - no mina catheter  - UOP aim 0.5-1.0 ml/kg/hr  - holding home finasteride until able to take PO     HEME:  - no transfusion needs at this time  - continue heparin gtt for Afib (normally takes Eliquis)     ID:  - vancomycin and cefepime for empiric coverage of hospital acquired pneumonia ( - )  - MRSA swab; if  negative, will d/c vancomycin     MSK:  - PT/OT  - Padding to protect pressure points     ENDO:  - continue SSI #3  - continue lantus 15 units  - POCT q6h now that glycemic status improved  - f/u TSH level     PPX:  - SCDs  - already anticoagulated     TUBES/LINES:  - PIV  - PICC line     DISPO: Remain in ICU    CODE STATUS: DNR-CCA, clarified 6/22     Humberto Sanchez MD  General Surgery PGY4  [facility]ICU [id number]    ==============================================================================  CHIEF COMPLAINT / OVERNIGHT EVENTS / HPI:   No acute overnight events. Remains on HFNC 60L/60%. Delirium overnight.    MEDICAL HISTORY / ROS:  Admission history and ROS reviewed.  - CT C/A/P with concern for multifocal pneumonia and concerning mass in R lung  - delirium  - increased O2 requirements  - afebrile    PHYSICAL EXAM:  Heart Rate:  []   Temp:  [35.9 °C (96.6 °F)-36.9 °C (98.4 °F)]   Resp:  [0-29]   BP: ()/(50-95)   SpO2:  [84 %-97 %]     Gen:                 Mildly distressed  HEENT:             Atraumatic, normocephalic                          NGT in place  Eyes:                 No scleral icterus  Card:                RRR  Resp:                Mildly labored breathing on high flow nasal cannula  Abd:                 Soft, non-tender, non-distended                          Midline wound stapled, no drainage, no erythema  Ext:                   WWP, no swelling of BLE  MSK:                GOLDEN  Neuro:              AOx3, no gross neurological deficits  Skin:                 Warm and dry    IMAGING SUMMARY:   CT C/A/P 6/23: multifocal pneumonia and concerning mass in R lung; no serious intraabdominal concerns    LABS:  Results from last 7 days   Lab Units 06/23/24  0313 06/22/24 2002 06/22/24  0500   WBC AUTO x10*3/uL 20.9* 20.1* 17.0*   HEMOGLOBIN g/dL 10.0* 10.5* 10.5*   HEMATOCRIT % 30.5* 32.2* 33.0*   PLATELETS AUTO x10*3/uL 147* 161 160     Results from last 7 days   Lab Units 06/23/24  0313 06/22/24 2002  06/19/24  1116   APTT seconds 53* 50* 31   INR  1.2* 1.3* 1.2*     Results from last 7 days   Lab Units 06/23/24  0313 06/22/24 2002 06/22/24  0500   SODIUM mmol/L 136 137 141   POTASSIUM mmol/L 3.8 3.9 3.9   CHLORIDE mmol/L 99 99 102   CO2 mmol/L 30 31 30   BUN mg/dL 28* 27* 26*   CREATININE mg/dL 0.84 0.82 0.71   CALCIUM mg/dL 8.0* 8.0* 7.9*   GLUCOSE mg/dL 166* 158* 284*         Results from last 7 days   Lab Units 06/22/24  1102 06/22/24  0859   POCT PH, ARTERIAL pH 7.47* 7.49*   POCT PCO2, ARTERIAL mm Hg 42 41   POCT PO2, ARTERIAL mm Hg 68* 63*   POCT HCO3 CALCULATED, ARTERIAL mmol/L 30.6* 31.2*   POCT BASE EXCESS, ARTERIAL mmol/L 6.3* 7.2*     I have reviewed all medications, laboratory results, and imaging pertinent for today's encounter.

## 2024-06-23 NOTE — PROGRESS NOTES
[facility]  ACUTE CARE SURGERY - PROGRESS NOTE    Patient Name: Enrike Crespo  MRN: [id number]  Admit Date: 613  : 1931  AGE: 89 y.o.   GENDER: male  ==============================================================================  TODAY'S ASSESSMENT AND PLAN OF CARE:  Enrike Crespo is a 89 y.o. male with PMH sick sinus syndrome status post pacemaker, hypertension, seizure, DM, afib, hyperthyroidism, PE, HF, cardiac arrest with defibrillator, stroke, sebaceous cysts, who presented for SBO 2/2 possible internal hernia.     GGC w/ SBFT  showed contrast retained in the stomach which was subsequently suctioned out from NGT after back on suction for increasing oxygen requirement. Abdominal exam is unchanged. Patient had about 4.8L out from NGT with new SHERLYN. Increased IVF to 100mL/hr but due to concern of heart failure and increased oxygen requirement, will hold off any any fluid boluses. Creatinine is down trending today. Still requiring 3L NC for SpO2 >90%. Not having consistent flatus or bowel movements but NGT output is decreased. Plan for CT A/P non con (concern for SHERLYN) with oral contrast and diagnostic and therapeutic for SBO. Also will give an enema.  CT findings concerning for a closed loop small bowel obstruction likely secondary to a pericecal internal hernia. Patient taken to the OR on .    OR : exploratory laparotomy, lysis of adhesions, small bowel resection with anastomosis, closure   Patient at high risk post op for aspiration and pneumonia. Poor respiratory effort at baseline. Aggressive bronchopulmonary hygiene, RT consult, IS, etc. No hard signs of ROBF yet, will continue NGT until flatus or BM.     Transferred to ICU on  for hypoxic failure requiring bipap and now on airvo at 50% FIO2. Has ongoing weak cough and respiratory effort. CT CAP obtained  yesterday concerning for worsening pulmonary edema and possible PNA. Started on empiric  antibiotic for HCAP. CT abdomen overall not concerning, no sign of leak from anastomosis.     - agree with ongoing HCAP antibiotics   - NGT removed today   - can attempt bedside swallow for meds   - continue heparin gtt for AFIB and high chadvasc of 7   - [facility]ICU      Patient seen and discussed with Attending Dr. Manish Ruiz MD  Acute Care Surgery   P [id number]      PHYSICAL EXAM:  Heart Rate:  []   Temp:  [36.1 °C (97 °F)-37 °C (98.6 °F)]   Resp:  [0-29]   BP: ()/(50-95)   SpO2:  [84 %-97 %]   Constitutional: no acute distress, old man, thin, frail, soft spoken, hard of hearing  Neuro: A/O x4, no gross deficits   Psych: normal affect  HEENT: No deformities, no scleral icterus   Cardiac: RRR  Pulmonary: increased work of breathing, decreased breath sounds bilaterally, shallow breaths, equal chest rise, on airvo  Abdomen: soft, non distended, non tender  Skin: warm and dry overall    Extremities: no swelling noted  MSK: moving all four      IMAGING SUMMARY:  (summary of new imaging findings, not a copy of dictation)  NA    LABS:  Results from last 7 days   Lab Units 06/23/24 0313 06/22/24 2002 06/22/24  0500   WBC AUTO x10*3/uL 20.9* 20.1* 17.0*   HEMOGLOBIN g/dL 10.0* 10.5* 10.5*   HEMATOCRIT % 30.5* 32.2* 33.0*   PLATELETS AUTO x10*3/uL 147* 161 160     Results from last 7 days   Lab Units 06/23/24 0313 06/22/24 2002 06/19/24  1116   APTT seconds 53* 50* 31   INR  1.2* 1.3* 1.2*     Results from last 7 days   Lab Units 06/23/24 0313 06/22/24 2002 06/22/24  0500   SODIUM mmol/L 136 137 141   POTASSIUM mmol/L 3.8 3.9 3.9   CHLORIDE mmol/L 99 99 102   CO2 mmol/L 30 31 30   BUN mg/dL 28* 27* 26*   CREATININE mg/dL 0.84 0.82 0.71   CALCIUM mg/dL 8.0* 8.0* 7.9*   GLUCOSE mg/dL 166* 158* 284*           Results from last 7 days   Lab Units 06/22/24  1102 06/22/24  0859   POCT PH, ARTERIAL pH 7.47* 7.49*   POCT PCO2, ARTERIAL mm Hg 42 41   POCT PO2, ARTERIAL mm Hg 68* 63*   POCT HCO3 CALCULATED,  ARTERIAL mmol/L 30.6* 31.2*   POCT BASE EXCESS, ARTERIAL mmol/L 6.3* 7.2*       I have reviewed all medications, laboratory results, and imaging pertinent for today's encounter.

## 2024-06-23 NOTE — PROGRESS NOTES
Oncology Nursing Communication Tool      7:51 AM  6/26/2017     Bedside shift change report given to Otis RN (incoming nurse) by Apryl Huff RN (outgoing nurse) on Marv Acevedo a 46 y.o. male who was admitted on 6/19/2017 10:21 AM. Report included the following information SBAR, Kardex, Intake/Output, MAR and Recent Results. Significant changes during shift: RLE redness/heat improved overnight. No stools overnight. Issues for physician to address: none            Code Status: Full Code     Infections: No current active infections     Allergies: Review of patient's allergies indicates no known allergies.      Current diet: DIET REGULAR       Pain Controlled [x] yes [] no   Bowel Movement [] yes [x] no   Last Bowel Movement (date)     6/25/17            Vital Signs:   Patient Vitals for the past 12 hrs:   Temp Pulse Resp BP SpO2   06/26/17 0520 98.3 °F (36.8 °C) 75 20 105/43 90 %   06/26/17 0455 - 75 - 105/43 -   06/25/17 2204 98.8 °F (37.1 °C) 79 22 109/55 92 %      Intake & Output:     Intake/Output Summary (Last 24 hours) at 06/26/17 0751  Last data filed at 06/26/17 0640   Gross per 24 hour   Intake                0 ml   Output             3050 ml   Net            -3050 ml      Laboratory Results:     Recent Results (from the past 12 hour(s))   METABOLIC PANEL, BASIC    Collection Time: 06/26/17  4:51 AM   Result Value Ref Range    Sodium 140 136 - 145 mmol/L    Potassium 3.7 3.5 - 5.1 mmol/L    Chloride 99 97 - 108 mmol/L    CO2 36 (H) 21 - 32 mmol/L    Anion gap 5 5 - 15 mmol/L    Glucose 81 65 - 100 mg/dL    BUN 22 (H) 6 - 20 MG/DL    Creatinine 0.43 (L) 0.70 - 1.30 MG/DL    BUN/Creatinine ratio 51 (H) 12 - 20      GFR est AA >60 >60 ml/min/1.73m2    GFR est non-AA >60 >60 ml/min/1.73m2    Calcium 8.2 (L) 8.5 - 10.1 MG/DL   MAGNESIUM    Collection Time: 06/26/17  4:51 AM   Result Value Ref Range    Magnesium 2.2 1.6 - 2.4 mg/dL              Opportunity for questions and Vancomycin Dosing by Pharmacy- Cessation of Therapy    Consult to pharmacy for vancomycin dosing has been discontinued by the prescriber, pharmacy will sign off at this time.    Please call pharmacy if there are further questions or re-enter a consult if vancomycin is resumed.     Kvng Bustos, GemmaD    clarifications were given to the incoming nurse. Patient's bed is in low position, side rails x2, door open PRN, call bell within reach and patient not in distress.       James Macias RN

## 2024-06-24 ENCOUNTER — APPOINTMENT (OUTPATIENT)
Dept: RADIOLOGY | Facility: HOSPITAL | Age: 89
DRG: 329 | End: 2024-06-24
Payer: MEDICARE

## 2024-06-24 VITALS
HEART RATE: 64 BPM | OXYGEN SATURATION: 94 % | TEMPERATURE: 97.9 F | DIASTOLIC BLOOD PRESSURE: 44 MMHG | SYSTOLIC BLOOD PRESSURE: 97 MMHG | HEIGHT: 69 IN | BODY MASS INDEX: 24.56 KG/M2 | RESPIRATION RATE: 17 BRPM | WEIGHT: 165.79 LBS

## 2024-06-24 LAB
GLUCOSE BLD MANUAL STRIP-MCNC: 140 MG/DL (ref 74–99)
GLUCOSE BLD MANUAL STRIP-MCNC: 175 MG/DL (ref 74–99)
GLUCOSE BLD MANUAL STRIP-MCNC: 181 MG/DL (ref 74–99)
GLUCOSE BLD MANUAL STRIP-MCNC: 192 MG/DL (ref 74–99)
GLUCOSE BLD MANUAL STRIP-MCNC: 243 MG/DL (ref 74–99)
VANCOMYCIN SERPL-MCNC: 8.2 UG/ML (ref 5–20)

## 2024-06-24 PROCEDURE — 37799 UNLISTED PX VASCULAR SURGERY: CPT | Performed by: EMERGENCY MEDICINE

## 2024-06-24 PROCEDURE — 94668 MNPJ CHEST WALL SBSQ: CPT

## 2024-06-24 PROCEDURE — 80202 ASSAY OF VANCOMYCIN: CPT | Performed by: EMERGENCY MEDICINE

## 2024-06-24 PROCEDURE — C9113 INJ PANTOPRAZOLE SODIUM, VIA: HCPCS | Performed by: STUDENT IN AN ORGANIZED HEALTH CARE EDUCATION/TRAINING PROGRAM

## 2024-06-24 PROCEDURE — 2500000005 HC RX 250 GENERAL PHARMACY W/O HCPCS: Performed by: STUDENT IN AN ORGANIZED HEALTH CARE EDUCATION/TRAINING PROGRAM

## 2024-06-24 PROCEDURE — 2500000004 HC RX 250 GENERAL PHARMACY W/ HCPCS (ALT 636 FOR OP/ED): Performed by: STUDENT IN AN ORGANIZED HEALTH CARE EDUCATION/TRAINING PROGRAM

## 2024-06-24 PROCEDURE — 2020000001 HC ICU ROOM DAILY

## 2024-06-24 PROCEDURE — 2500000004 HC RX 250 GENERAL PHARMACY W/ HCPCS (ALT 636 FOR OP/ED): Performed by: NURSE PRACTITIONER

## 2024-06-24 PROCEDURE — 71045 X-RAY EXAM CHEST 1 VIEW: CPT

## 2024-06-24 PROCEDURE — 2500000004 HC RX 250 GENERAL PHARMACY W/ HCPCS (ALT 636 FOR OP/ED): Mod: JZ | Performed by: STUDENT IN AN ORGANIZED HEALTH CARE EDUCATION/TRAINING PROGRAM

## 2024-06-24 PROCEDURE — 87205 SMEAR GRAM STAIN: CPT | Performed by: STUDENT IN AN ORGANIZED HEALTH CARE EDUCATION/TRAINING PROGRAM

## 2024-06-24 PROCEDURE — 97530 THERAPEUTIC ACTIVITIES: CPT | Mod: GP

## 2024-06-24 PROCEDURE — 99231 SBSQ HOSP IP/OBS SF/LOW 25: CPT | Performed by: SURGERY

## 2024-06-24 PROCEDURE — 94660 CPAP INITIATION&MGMT: CPT

## 2024-06-24 PROCEDURE — 97164 PT RE-EVAL EST PLAN CARE: CPT | Mod: GP

## 2024-06-24 PROCEDURE — 2500000002 HC RX 250 W HCPCS SELF ADMINISTERED DRUGS (ALT 637 FOR MEDICARE OP, ALT 636 FOR OP/ED): Performed by: EMERGENCY MEDICINE

## 2024-06-24 PROCEDURE — 2580000001 HC RX 258 IV SOLUTIONS: Performed by: STUDENT IN AN ORGANIZED HEALTH CARE EDUCATION/TRAINING PROGRAM

## 2024-06-24 PROCEDURE — 71045 X-RAY EXAM CHEST 1 VIEW: CPT | Performed by: RADIOLOGY

## 2024-06-24 PROCEDURE — 94640 AIRWAY INHALATION TREATMENT: CPT

## 2024-06-24 PROCEDURE — 2500000004 HC RX 250 GENERAL PHARMACY W/ HCPCS (ALT 636 FOR OP/ED): Performed by: PHYSICIAN ASSISTANT

## 2024-06-24 PROCEDURE — 2500000002 HC RX 250 W HCPCS SELF ADMINISTERED DRUGS (ALT 637 FOR MEDICARE OP, ALT 636 FOR OP/ED): Performed by: STUDENT IN AN ORGANIZED HEALTH CARE EDUCATION/TRAINING PROGRAM

## 2024-06-24 PROCEDURE — 94762 N-INVAS EAR/PLS OXIMTRY CONT: CPT

## 2024-06-24 PROCEDURE — 82947 ASSAY GLUCOSE BLOOD QUANT: CPT | Mod: 91

## 2024-06-24 RX ORDER — INSULIN GLARGINE 100 [IU]/ML
27 INJECTION, SOLUTION SUBCUTANEOUS EVERY MORNING
Status: DISCONTINUED | OUTPATIENT
Start: 2024-06-25 | End: 2024-06-25

## 2024-06-24 RX ORDER — POTASSIUM CHLORIDE 14.9 MG/ML
20 INJECTION INTRAVENOUS ONCE
Status: DISCONTINUED | OUTPATIENT
Start: 2024-06-24 | End: 2024-06-24

## 2024-06-24 RX ORDER — INSULIN GLARGINE 100 [IU]/ML
12 INJECTION, SOLUTION SUBCUTANEOUS ONCE
Status: DISCONTINUED | OUTPATIENT
Start: 2024-06-24 | End: 2024-06-25

## 2024-06-24 ASSESSMENT — PAIN - FUNCTIONAL ASSESSMENT
PAIN_FUNCTIONAL_ASSESSMENT: 0-10
PAIN_FUNCTIONAL_ASSESSMENT: CPOT (CRITICAL CARE PAIN OBSERVATION TOOL)
PAIN_FUNCTIONAL_ASSESSMENT: CPOT (CRITICAL CARE PAIN OBSERVATION TOOL)
PAIN_FUNCTIONAL_ASSESSMENT: 0-10
PAIN_FUNCTIONAL_ASSESSMENT: 0-10

## 2024-06-24 ASSESSMENT — COGNITIVE AND FUNCTIONAL STATUS - GENERAL
TURNING FROM BACK TO SIDE WHILE IN FLAT BAD: A LOT
MOVING FROM LYING ON BACK TO SITTING ON SIDE OF FLAT BED WITH BEDRAILS: A LOT
STANDING UP FROM CHAIR USING ARMS: TOTAL
WALKING IN HOSPITAL ROOM: TOTAL
CLIMB 3 TO 5 STEPS WITH RAILING: TOTAL
MOBILITY SCORE: 8
MOVING TO AND FROM BED TO CHAIR: TOTAL

## 2024-06-24 ASSESSMENT — PAIN SCALES - GENERAL
PAINLEVEL_OUTOF10: 0 - NO PAIN

## 2024-06-24 NOTE — PROGRESS NOTES
[facility]  ACUTE CARE SURGERY - PROGRESS NOTE    Patient Name: Enrike Crespo  MRN: [id number]  Admit Date: 613  : 1931  AGE: 89 y.o.   GENDER: male  ==============================================================================  TODAY'S ASSESSMENT AND PLAN OF CARE:  Enrike Crespo is a 89 y.o. male with PMH sick sinus syndrome status post pacemaker, hypertension, seizure, DM, afib, hyperthyroidism, PE, HF, cardiac arrest with defibrillator, stroke, sebaceous cysts, who presented for SBO 2/2 possible internal hernia.     GGC w/ SBFT  showed contrast retained in the stomach which was subsequently suctioned out from NGT after back on suction for increasing oxygen requirement. Abdominal exam is unchanged. Patient had about 4.8L out from NGT with new SHERLYN. Increased IVF to 100mL/hr but due to concern of heart failure and increased oxygen requirement, will hold off any any fluid boluses. Creatinine is down trending today. Still requiring 3L NC for SpO2 >90%. Not having consistent flatus or bowel movements but NGT output is decreased. Plan for CT A/P non con (concern for SHERLYN) with oral contrast and diagnostic and therapeutic for SBO. Also will give an enema.  CT findings concerning for a closed loop small bowel obstruction likely secondary to a pericecal internal hernia. Patient taken to the OR on .    OR : exploratory laparotomy, lysis of adhesions, small bowel resection with anastomosis, closure     Transferred to ICU on  for hypoxic failure requiring bipap and now on airvo at 38% FIO2, 60 L. Has ongoing weak cough and respiratory effort. CT CAP obtained  on 24 concerning for worsening pulmonary edema and possible PNA. Started on empiric antibiotic for HCAP. CT abdomen overall not concerning, no sign of leak from anastomosis.     Plan:  - agree with ongoing HCAP antibiotics   - continue TPN  - consider speech evaluation prior to initiation of PO intake vs  dobhoff placement  - continue heparin gtt for AFIB and high chadvasc of 7   - TSICU      Patient discussed with Attending Dr. Carlene NIÑO leigh  Acute Care Surgery   P [id number]      PHYSICAL EXAM:  Heart Rate:  [62-96]   Temp:  [36.6 °C (97.9 °F)-37 °C (98.6 °F)]   Resp:  [12-29]   BP: ()/()   Weight:  [79.5 kg (175 lb 4.3 oz)]   SpO2:  [89 %-96 %]   Constitutional: no acute distress, disoriented  Cardiac: nontachy  Pulmonary: shallow breathing, on airvo  Abdomen: soft, non distended, non tender, incision c/d/I without signs of infection, closed with staples  Skin: warm and dry   Extremities: no swelling noted      IMAGING SUMMARY:   1.  Slight improvement in right basilar aeration with continued right  perihilar and left-sided edema/effusions.  2. Interval improvement in pneumoperitoneum.    LABS:  Results from last 7 days   Lab Units 06/23/24 2323 06/23/24 0313 06/22/24 2002   WBC AUTO x10*3/uL 17.3* 20.9* 20.1*   HEMOGLOBIN g/dL 8.3* 10.0* 10.5*   HEMATOCRIT % 26.0* 30.5* 32.2*   PLATELETS AUTO x10*3/uL 137* 147* 161     Results from last 7 days   Lab Units 06/23/24 2323 06/23/24 0313 06/22/24 2002   APTT seconds 72* 53* 50*   INR  1.3* 1.2* 1.3*     Results from last 7 days   Lab Units 06/23/24 2323 06/23/24 0313 06/22/24 2002   SODIUM mmol/L 135* 136 137   POTASSIUM mmol/L 3.8 3.8 3.9   CHLORIDE mmol/L 100 99 99   CO2 mmol/L 30 30 31   BUN mg/dL 32* 28* 27*   CREATININE mg/dL 0.91 0.84 0.82   CALCIUM mg/dL 7.5* 8.0* 8.0*   GLUCOSE mg/dL 217* 166* 158*           Results from last 7 days   Lab Units 06/22/24  1102 06/22/24  0859   POCT PH, ARTERIAL pH 7.47* 7.49*   POCT PCO2, ARTERIAL mm Hg 42 41   POCT PO2, ARTERIAL mm Hg 68* 63*   POCT HCO3 CALCULATED, ARTERIAL mmol/L 30.6* 31.2*   POCT BASE EXCESS, ARTERIAL mmol/L 6.3* 7.2*       I have reviewed all medications, laboratory results, and imaging pertinent for today's encounter.

## 2024-06-24 NOTE — PROGRESS NOTES
[facility]  TRAUMA ICU - PROGRESS NOTE    Patient Name: Enrike Crespo  MRN: [id number]  Admit Date: 613  : 1931  AGE: 89 y.o.   GENDER: male  ==============================================================================  89 yo M with multiple medical comorbidities including SSS s/p atrial paced PPM, prior cardiac arrest, HF, PE, Afib on Eliquis, DM, hyperthyroidism, and HTN, who was initially admitted for a high-grade SBO and on  underwent ex lap with two small bowel resections for a closed loop obstruction, transferred to ICU on  for rising O2 requirements. Rising leukocytosis and CXR with worsening LLL consolidation, suggesting infection superimposed on compressive atelectasis and mild effusion. Course further complicated by ongoing ileus. Still requiring HFNC but stable.    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 Can follow up outpatient if family desires at a later date      CARD:  - Continuous cardiac monitoring  - IV metoprolol (in place of home dose)  - holding home statin, entresto  - anticoagulate for Afib (see Hematology section)      - on heparin drip  - Maintain MAP >65     GI/FEN:  - NPO for now  - awaiting return of bowel function  - TPN 83 ml/hr  - continue thiamine (for severe malnutrition) for 7 days  - holding tube feeds  - Strict I/O     RENAL:  - no mina catheter  - UOP aim 0.5-1.0 ml/kg/hr  - holding home finasteride until able to take PO     HEME:  - no transfusion needs at this time  - continue heparin gtt for  Afib (normally takes Eliquis)     ID:  - vancomycin and cefepime for empiric coverage of hospital acquired pneumonia (6/22 - 6/28)  - MRSA swab negative, discontinued vanc     MSK:  - PT/OT  - Padding to protect pressure points     ENDO:  - continue SSI #3  - continue lantus 15 units   - 6/24 last 24 hrs glucose levels 192-264     - add 12 units of lantus today 6/24 (for total of 27 units)  - POCT q6h now that glycemic status improved  - TSH within normal limits      PPX:  - SCDs  - already anticoagulated     TUBES/LINES:  - PIV  - PICC line     DISPO: Remain in ICU     CODE STATUS: DNR-CCA, clarified 6/22    Pt seen and discussed with fellow Dr. Snyder    Total face to face time spent with patient/family of 40 minutes, with >50% of the time spent discussing plan of care/management, counseling/educating on disease processes, explaining results of diagnostic testing.     Hawa Farmer PA-C  Trauma, Critical Care, Acute Care Surgery   Floor: [id number]  TSICU: [id number]   ==============================================================================  CHIEF COMPLAINT / OVERNIGHT EVENTS / HPI:   Overnight pt hypotensive, given 1L, responded well and became hypertensive and given metop. Pt this morning A&Ox2     MEDICAL HISTORY / ROS:  Admission history and ROS reviewed. Pertinent changes as follows:    PHYSICAL EXAM:  Heart Rate:  [62-96]   Temp:  [36.6 °C (97.9 °F)-37 °C (98.6 °F)]   Resp:  [12-29]   BP: ()/()   Weight:  [79.5 kg (175 lb 4.3 oz)]   SpO2:  [89 %-96 %]   Physical Exam  HENT:      Head: Normocephalic and atraumatic.      Mouth/Throat:      Mouth: Mucous membranes are moist.   Eyes:      Extraocular Movements: Extraocular movements intact.   Cardiovascular:      Rate and Rhythm: Normal rate.   Pulmonary:      Comments: Airvo 60L and 52%  Abdominal:      General: Abdomen is flat. There is no distension.      Palpations: Abdomen is soft.   Musculoskeletal:      Comments: Moving extremities spontaneously    Skin:     General: Skin is warm and dry.   Neurological:      Mental Status: He is alert.      Comments: A&Ox2, following commands    Psychiatric:         Mood and Affect: Mood normal.      Comments: A&Ox2         IMAGING SUMMARY:  (summary of new imaging findings, not a copy of dictation)    LABS:  Results from last 7 days   Lab Units 06/23/24 2323 06/23/24 0313 06/22/24 2002   WBC AUTO x10*3/uL 17.3* 20.9* 20.1*   HEMOGLOBIN g/dL 8.3* 10.0* 10.5*   HEMATOCRIT % 26.0* 30.5* 32.2*   PLATELETS AUTO x10*3/uL 137* 147* 161     Results from last 7 days   Lab Units 06/23/24 2323 06/23/24 0313 06/22/24 2002   APTT seconds 72* 53* 50*   INR  1.3* 1.2* 1.3*     Results from last 7 days   Lab Units 06/23/24 2323 06/23/24 0313 06/22/24 2002   SODIUM mmol/L 135* 136 137   POTASSIUM mmol/L 3.8 3.8 3.9   CHLORIDE mmol/L 100 99 99   CO2 mmol/L 30 30 31   BUN mg/dL 32* 28* 27*   CREATININE mg/dL 0.91 0.84 0.82   CALCIUM mg/dL 7.5* 8.0* 8.0*   GLUCOSE mg/dL 217* 166* 158*         Results from last 7 days   Lab Units 06/22/24  1102 06/22/24  0859   POCT PH, ARTERIAL pH 7.47* 7.49*   POCT PCO2, ARTERIAL mm Hg 42 41   POCT PO2, ARTERIAL mm Hg 68* 63*   POCT HCO3 CALCULATED, ARTERIAL mmol/L 30.6* 31.2*   POCT BASE EXCESS, ARTERIAL mmol/L 6.3* 7.2*     I have reviewed all medications, laboratory results, and imaging pertinent for today's encounter.

## 2024-06-24 NOTE — PROGRESS NOTES
Physical Therapy    Physical Therapy Re-Evaluation & Treatment    Patient Name: Enrike Crespo  MRN: [id number]  Today's Date: 6/24/2024   Time Calculation  Start Time: 1033  Stop Time: 1058  Time Calculation (min): 25 min    Assessment/Plan   PT Assessment  End of Session Communication: Bedside nurse  Assessment Comment: [facility] s[facility]. Pt presents with increased deficits in strength, endurance, cognition, activity tolerance, and overall impairments in functional mobility. Pt will benefit from continued skilled PT to address deficits.  End of Session Patient Position: Bed, 4 rail up, Alarm on (mitts donned)   IP OR SWING BED PT PLAN  Inpatient or Swing Bed: Inpatient  PT Plan  Treatment/Interventions: Bed mobility, Transfer training, Gait training, Balance training, Strengthening, Endurance training, Range of motion, Therapeutic exercise, Therapeutic activity, Positioning, Postural re-education  PT Plan: Ongoing PT  PT Frequency: 4 times per week  PT Discharge Recommendations: Moderate intensity level of continued care  Equipment Recommended upon Discharge: Wheeled walker  PT Recommended Transfer Status: Total assist  PT - OK to Discharge: Yes      Subjective     General Visit Information:  General  Reason for Referral: Pt presented to the ER with abdominal pain, admitted for SBO 2/2 possible internal hernia. [facility] 2/2 respiratory distress/hypoxic failure with worsening pulmonary edema  Past Medical History Relevant to Rehab: sick sinus syndrome status post pacemaker, hypertension, seizure, DM, afib, hyperthyroidism, PE, HF, cardiac arrest with defibrillator, stroke, sebaceous cysts  Prior to Session Communication: Bedside nurse  Patient Position Received: Alarm on, Bed, 4 rail up  Preferred Learning Style: verbal, auditory  General Comment: Pt supine in bed, pleasantly confused, difficulty communicating 2/2 [place] and (Airvo 60L; 53% FiO2. BJE  mitts)     Precautions:  Precautions  Hearing/Visual Limitations: [place]  Medical Precautions: Fall precautions, Abdominal precautions  Vital Signs:  Vital Signs  Heart Rate: 87 (post 98)  Resp: 22 (post 23)  SpO2: 91 % (post 95)  BP: (!) 118/49 (post 104/62)  MAP (mmHg): 69 (post 75)    Objective   Pain:  Pain Assessment  Pain Assessment: CPOT (Critical Care Pain Observation Tool)  0-10 (Numeric) Pain Score: 0 - No pain  Cognition:  Cognition  Overall Cognitive Status: Impaired  Arousal/Alertness: Delayed responses to stimuli  Orientation Level: Disoriented X4  Following Commands:  (follows 50% simple commands with visual/body centric cueing)  Safety Judgment: Decreased awareness of need for assistance  Insight: Moderate  Impulsive: Moderately    Functional Assessments:  Bed Mobility  Bed Mobility: Yes  Bed Mobility 1  Bed Mobility 1: Supine to sitting  Level of Assistance 1: Maximum assistance  Bed Mobility Comments 1: cues for sequencing  Bed Mobility 2  Bed Mobility  2: Sitting to supine, Scooting  Level of Assistance 2: Dependent, +2  Bed Mobility Comments 2: drawsheet         Ambulation/Gait Training  Ambulation/Gait Training Performed: No    Stairs  Stairs: No  Extremity/Trunk Assessments:        RLE   RLE :  (>3+/5 functionally, unable to assess formally 2/2 cognition)  LLE   LLE :  (>3+/5 functionally, unable to assess formally 2/2 cognition)  Treatments:  Therapeutic Activity  Therapeutic Activity Performed: Yes  Therapeutic Activity 1: Dependent transitioned pt from supine > chair positioning to assess vitals response prior to EOB sitting  Therapeutic Activity 2: Pt tolerated sitting EOB ~5 minutes with heavy L lean requiring mod-maxA to maintain balance. Pt became restless attempting to remove BUE mitts, returned to supine for safety  Outcome Measures:  [facility] Basic Mobility  Turning from your back to your side while in a flat bed without using bedrails: A lot  Moving from lying on your back to sitting on  the side of a flat bed without using bedrails: A lot  Moving to and from bed to chair (including a wheelchair): Total  Standing up from a chair using your arms (e.g. wheelchair or bedside chair): Total  To walk in hospital room: Total  Climbing 3-5 steps with railing: Total  Basic Mobility - Total Score: 8    FSS-ICU  Ambulation: Unable to attempt due to weakness  Rolling: Maximal assistance (performs 25% - 49% of task)  Sitting: Maximal assistance (performs 25% - 49% of task)  Transfer Sit-to-Stand: Unable to perform  Transfer Supine-to-Sit: Maximal assistance (performs 25% - 49% of task)  Total Score: 6      ICU Mobility Screen  Early Mobility/Exercise Safety Screen: Proceed with mobilization - No exclusion criteria met  ICU Mobility Scale: Sitting over edge of bed    Encounter Problems       Encounter Problems (Active)       Balance       Patient will complete static and dynamic sitting balance tasks with SBA to improve upright posture, core activation, and proximal stability.  (Progressing)       Start:  06/17/24    Expected End:  07/01/24            Patient to demo static standing with unilateral UE support, performing single UE task with SBA, no sway or LOB x 2 mins for functional carryover  (Progressing)       Start:  06/17/24    Expected End:  07/01/24               Mobility       STG - Patient will ambulate >/= 50 ft with CGAx1 and LRAD (Not Progressing)       Start:  06/17/24    Expected End:  07/01/24            Pt. will tolerate >/= 15 minutes of OOB mobility without seated rest break and VSS to demo improved activity tolerance/endurance.  (Not Progressing)       Start:  06/17/24    Expected End:  07/01/24               PT Transfers       STG - Patient will perform bed mobility IND (Progressing)       Start:  06/17/24    Expected End:  07/01/24            STG - Patient will transfer sit to and from stand [place] with LRAD (Progressing)       Start:  06/17/24    Expected End:  07/01/24                    Education Documentation  Precautions, taught by Theresa Petit PT at 6/24/2024 12:24 PM.  Learner: Patient  Readiness: Acceptance  Method: Explanation  Response: Needs Reinforcement    Body Mechanics, taught by Theresa Petit PT at 6/24/2024 12:24 PM.  Learner: Patient  Readiness: Acceptance  Method: Explanation  Response: Needs Reinforcement    Mobility Training, taught by Theresa Petit PT at 6/24/2024 12:24 PM.  Learner: Patient  Readiness: Acceptance  Method: Explanation  Response: Needs Reinforcement    Education Comments  No comments found.    Theresa Petit PT

## 2024-06-24 NOTE — DISCHARGE INSTRUCTIONS
Thoracic surgeon, Dr. Donavon Jones, is available for an office appointment to work up the right upper lung nodule. Please call the thoracic surgery office to schedule an appointment. [phone]

## 2024-06-25 ENCOUNTER — APPOINTMENT (OUTPATIENT)
Dept: RADIOLOGY | Facility: HOSPITAL | Age: 89
DRG: 329 | End: 2024-06-25
Payer: MEDICARE

## 2024-06-25 LAB
ALBUMIN SERPL BCP-MCNC: 2.2 G/DL (ref 3.4–5)
ANION GAP BLDA CALCULATED.4IONS-SCNC: 4 MMO/L (ref 10–25)
ANION GAP SERPL CALC-SCNC: 10 MMOL/L (ref 10–20)
APTT PPP: 63 SECONDS (ref 27–38)
BASE EXCESS BLDA CALC-SCNC: 4.5 MMOL/L (ref -2–3)
BASE EXCESS BLDA CALC-SCNC: 6 MMOL/L (ref -2–3)
BNP SERPL-MCNC: 387 PG/ML (ref 0–99)
BODY TEMPERATURE: 37 DEGREES CELSIUS
BODY TEMPERATURE: 37 DEGREES CELSIUS
BUN SERPL-MCNC: 30 MG/DL (ref 6–23)
CA-I BLDA-SCNC: 1.29 MMOL/L (ref 1.1–1.33)
CALCIUM SERPL-MCNC: 7.9 MG/DL (ref 8.6–10.6)
CHLORIDE BLDA-SCNC: 104 MMOL/L (ref 98–107)
CHLORIDE SERPL-SCNC: 104 MMOL/L (ref 98–107)
CO2 SERPL-SCNC: 31 MMOL/L (ref 21–32)
CREAT SERPL-MCNC: 0.91 MG/DL (ref 0.5–1.3)
EGFRCR SERPLBLD CKD-EPI 2021: 79 ML/MIN/1.73M*2
ERYTHROCYTE [DISTWIDTH] IN BLOOD BY AUTOMATED COUNT: 13 % (ref 11.5–14.5)
GLUCOSE BLD MANUAL STRIP-MCNC: 196 MG/DL (ref 74–99)
GLUCOSE BLD MANUAL STRIP-MCNC: 215 MG/DL (ref 74–99)
GLUCOSE BLDA-MCNC: 227 MG/DL (ref 74–99)
GLUCOSE SERPL-MCNC: 213 MG/DL (ref 74–99)
HCO3 BLDA-SCNC: 30.4 MMOL/L (ref 22–26)
HCO3 BLDA-SCNC: 30.6 MMOL/L (ref 22–26)
HCT VFR BLD AUTO: 26.6 % (ref 41–52)
HCT VFR BLD EST: 27 % (ref 41–52)
HGB BLD-MCNC: 8.4 G/DL (ref 13.5–17.5)
HGB BLDA-MCNC: 9 G/DL (ref 13.5–17.5)
INHALED O2 CONCENTRATION: 95 %
INHALED O2 CONCENTRATION: 95 %
INR PPP: 1.3 (ref 0.9–1.1)
LACTATE BLDA-SCNC: 0.5 MMOL/L (ref 0.4–2)
MAGNESIUM SERPL-MCNC: 1.89 MG/DL (ref 1.6–2.4)
MCH RBC QN AUTO: 30.9 PG (ref 26–34)
MCHC RBC AUTO-ENTMCNC: 31.6 G/DL (ref 32–36)
MCV RBC AUTO: 98 FL (ref 80–100)
NRBC BLD-RTO: 0 /100 WBCS (ref 0–0)
OXYHGB MFR BLDA: 69.6 % (ref 94–98)
OXYHGB MFR BLDA: 94.2 % (ref 94–98)
PCO2 BLDA: 44 MM HG (ref 38–42)
PCO2 BLDA: 49 MM HG (ref 38–42)
PH BLDA: 7.4 PH (ref 7.38–7.42)
PH BLDA: 7.45 PH (ref 7.38–7.42)
PHOSPHATE SERPL-MCNC: 3.1 MG/DL (ref 2.5–4.9)
PLATELET # BLD AUTO: 170 X10*3/UL (ref 150–450)
PO2 BLDA: 44 MM HG (ref 85–95)
PO2 BLDA: 72 MM HG (ref 85–95)
POTASSIUM BLDA-SCNC: 4.2 MMOL/L (ref 3.5–5.3)
POTASSIUM SERPL-SCNC: 4.2 MMOL/L (ref 3.5–5.3)
PROTHROMBIN TIME: 14.6 SECONDS (ref 9.8–12.8)
RBC # BLD AUTO: 2.72 X10*6/UL (ref 4.5–5.9)
SAO2 % BLDA: 71 % (ref 94–100)
SAO2 % BLDA: 96 % (ref 94–100)
SODIUM BLDA-SCNC: 134 MMOL/L (ref 136–145)
SODIUM SERPL-SCNC: 141 MMOL/L (ref 136–145)
UFH PPP CHRO-ACNC: 0.3 IU/ML
WBC # BLD AUTO: 14.1 X10*3/UL (ref 4.4–11.3)

## 2024-06-25 PROCEDURE — 2500000004 HC RX 250 GENERAL PHARMACY W/ HCPCS (ALT 636 FOR OP/ED): Performed by: PHYSICIAN ASSISTANT

## 2024-06-25 PROCEDURE — 94668 MNPJ CHEST WALL SBSQ: CPT

## 2024-06-25 PROCEDURE — 2500000004 HC RX 250 GENERAL PHARMACY W/ HCPCS (ALT 636 FOR OP/ED): Mod: JZ | Performed by: STUDENT IN AN ORGANIZED HEALTH CARE EDUCATION/TRAINING PROGRAM

## 2024-06-25 PROCEDURE — 82805 BLOOD GASES W/O2 SATURATION: CPT | Performed by: STUDENT IN AN ORGANIZED HEALTH CARE EDUCATION/TRAINING PROGRAM

## 2024-06-25 PROCEDURE — 2500000002 HC RX 250 W HCPCS SELF ADMINISTERED DRUGS (ALT 637 FOR MEDICARE OP, ALT 636 FOR OP/ED): Performed by: STUDENT IN AN ORGANIZED HEALTH CARE EDUCATION/TRAINING PROGRAM

## 2024-06-25 PROCEDURE — 37799 UNLISTED PX VASCULAR SURGERY: CPT | Performed by: STUDENT IN AN ORGANIZED HEALTH CARE EDUCATION/TRAINING PROGRAM

## 2024-06-25 PROCEDURE — 85610 PROTHROMBIN TIME: CPT | Performed by: STUDENT IN AN ORGANIZED HEALTH CARE EDUCATION/TRAINING PROGRAM

## 2024-06-25 PROCEDURE — P9047 ALBUMIN (HUMAN), 25%, 50ML: HCPCS | Mod: JZ | Performed by: STUDENT IN AN ORGANIZED HEALTH CARE EDUCATION/TRAINING PROGRAM

## 2024-06-25 PROCEDURE — 99024 POSTOP FOLLOW-UP VISIT: CPT | Performed by: SURGERY

## 2024-06-25 PROCEDURE — C9113 INJ PANTOPRAZOLE SODIUM, VIA: HCPCS | Performed by: STUDENT IN AN ORGANIZED HEALTH CARE EDUCATION/TRAINING PROGRAM

## 2024-06-25 PROCEDURE — 2500000004 HC RX 250 GENERAL PHARMACY W/ HCPCS (ALT 636 FOR OP/ED)

## 2024-06-25 PROCEDURE — 83735 ASSAY OF MAGNESIUM: CPT | Performed by: STUDENT IN AN ORGANIZED HEALTH CARE EDUCATION/TRAINING PROGRAM

## 2024-06-25 PROCEDURE — 2500000004 HC RX 250 GENERAL PHARMACY W/ HCPCS (ALT 636 FOR OP/ED): Performed by: STUDENT IN AN ORGANIZED HEALTH CARE EDUCATION/TRAINING PROGRAM

## 2024-06-25 PROCEDURE — 83880 ASSAY OF NATRIURETIC PEPTIDE: CPT

## 2024-06-25 PROCEDURE — 85520 HEPARIN ASSAY: CPT | Performed by: STUDENT IN AN ORGANIZED HEALTH CARE EDUCATION/TRAINING PROGRAM

## 2024-06-25 PROCEDURE — 71045 X-RAY EXAM CHEST 1 VIEW: CPT

## 2024-06-25 PROCEDURE — 2500000002 HC RX 250 W HCPCS SELF ADMINISTERED DRUGS (ALT 637 FOR MEDICARE OP, ALT 636 FOR OP/ED): Performed by: EMERGENCY MEDICINE

## 2024-06-25 PROCEDURE — 80069 RENAL FUNCTION PANEL: CPT | Performed by: STUDENT IN AN ORGANIZED HEALTH CARE EDUCATION/TRAINING PROGRAM

## 2024-06-25 PROCEDURE — 2020000001 HC ICU ROOM DAILY

## 2024-06-25 PROCEDURE — 84132 ASSAY OF SERUM POTASSIUM: CPT

## 2024-06-25 PROCEDURE — 2500000005 HC RX 250 GENERAL PHARMACY W/O HCPCS: Performed by: STUDENT IN AN ORGANIZED HEALTH CARE EDUCATION/TRAINING PROGRAM

## 2024-06-25 PROCEDURE — 94640 AIRWAY INHALATION TREATMENT: CPT

## 2024-06-25 PROCEDURE — 71045 X-RAY EXAM CHEST 1 VIEW: CPT | Performed by: RADIOLOGY

## 2024-06-25 PROCEDURE — 31720 CLEARANCE OF AIRWAYS: CPT

## 2024-06-25 PROCEDURE — 85027 COMPLETE CBC AUTOMATED: CPT | Performed by: STUDENT IN AN ORGANIZED HEALTH CARE EDUCATION/TRAINING PROGRAM

## 2024-06-25 PROCEDURE — 82947 ASSAY GLUCOSE BLOOD QUANT: CPT

## 2024-06-25 RX ORDER — KETOROLAC TROMETHAMINE 15 MG/ML
15 INJECTION, SOLUTION INTRAMUSCULAR; INTRAVENOUS EVERY 6 HOURS PRN
Status: DISCONTINUED | OUTPATIENT
Start: 2024-06-25 | End: 2024-06-26 | Stop reason: HOSPADM

## 2024-06-25 RX ORDER — ACETAMINOPHEN 650 MG/1
650 SUPPOSITORY RECTAL EVERY 4 HOURS PRN
Status: DISCONTINUED | OUTPATIENT
Start: 2024-06-25 | End: 2024-06-26 | Stop reason: HOSPADM

## 2024-06-25 RX ORDER — FUROSEMIDE 10 MG/ML
20 INJECTION INTRAMUSCULAR; INTRAVENOUS ONCE
Status: COMPLETED | OUTPATIENT
Start: 2024-06-25 | End: 2024-06-25

## 2024-06-25 RX ORDER — ALBUMIN HUMAN 250 G/1000ML
25 SOLUTION INTRAVENOUS ONCE
Status: COMPLETED | OUTPATIENT
Start: 2024-06-25 | End: 2024-06-25

## 2024-06-25 RX ORDER — HALOPERIDOL 5 MG/ML
1 INJECTION INTRAMUSCULAR EVERY 4 HOURS PRN
Status: DISCONTINUED | OUTPATIENT
Start: 2024-06-25 | End: 2024-06-26 | Stop reason: HOSPADM

## 2024-06-25 RX ORDER — GLYCOPYRROLATE 0.2 MG/ML
0.2 INJECTION INTRAMUSCULAR; INTRAVENOUS EVERY 4 HOURS PRN
Status: DISCONTINUED | OUTPATIENT
Start: 2024-06-25 | End: 2024-06-26 | Stop reason: HOSPADM

## 2024-06-25 RX ORDER — MORPHINE SULFATE IN 0.9 % NACL 30 MG/30ML
.5-1 PATIENT CONTROLLED ANALGESIA SYRINGE INTRAVENOUS CONTINUOUS
Status: DISCONTINUED | OUTPATIENT
Start: 2024-06-25 | End: 2024-06-26 | Stop reason: HOSPADM

## 2024-06-25 RX ORDER — ALBUTEROL SULFATE 0.83 MG/ML
2.5 SOLUTION RESPIRATORY (INHALATION) EVERY 4 HOURS PRN
Status: DISCONTINUED | OUTPATIENT
Start: 2024-06-25 | End: 2024-06-26 | Stop reason: HOSPADM

## 2024-06-25 RX ORDER — LORAZEPAM 2 MG/ML
2 INJECTION INTRAMUSCULAR EVERY 10 MIN PRN
Status: DISCONTINUED | OUTPATIENT
Start: 2024-06-25 | End: 2024-06-26 | Stop reason: HOSPADM

## 2024-06-25 RX ORDER — SODIUM CHLORIDE FOR INHALATION 3 %
3 VIAL, NEBULIZER (ML) INHALATION EVERY 6 HOURS SCHEDULED
Status: DISCONTINUED | OUTPATIENT
Start: 2024-06-25 | End: 2024-06-25

## 2024-06-25 RX ORDER — MORPHINE SULFATE 4 MG/ML
2 INJECTION INTRAVENOUS
Status: DISCONTINUED | OUTPATIENT
Start: 2024-06-25 | End: 2024-06-25

## 2024-06-25 RX ORDER — MORPHINE SULFATE 4 MG/ML
4 INJECTION INTRAVENOUS
Status: DISCONTINUED | OUTPATIENT
Start: 2024-06-25 | End: 2024-06-25

## 2024-06-25 RX ORDER — FENTANYL CITRATE-0.9 % NACL/PF 10 MCG/ML
25-200 PLASTIC BAG, INJECTION (ML) INTRAVENOUS CONTINUOUS
Status: DISCONTINUED | OUTPATIENT
Start: 2024-06-25 | End: 2024-06-26 | Stop reason: HOSPADM

## 2024-06-25 RX ORDER — LORAZEPAM 2 MG/ML
0.5 INJECTION INTRAMUSCULAR EVERY 4 HOURS PRN
Status: DISCONTINUED | OUTPATIENT
Start: 2024-06-25 | End: 2024-06-26 | Stop reason: HOSPADM

## 2024-06-25 ASSESSMENT — PAIN - FUNCTIONAL ASSESSMENT
PAIN_FUNCTIONAL_ASSESSMENT: 0-10
PAIN_FUNCTIONAL_ASSESSMENT: 0-10

## 2024-06-25 ASSESSMENT — PAIN SCALES - GENERAL
PAINLEVEL_OUTOF10: 0 - NO PAIN
PAINLEVEL_OUTOF10: 0 - NO PAIN

## 2024-06-25 NOTE — PROGRESS NOTES
"Nutrition Follow Up Assessment:   Nutrition Assessment    Patient is a 89 year old male-- remains on TPN and NPO, transferred to TSU on 6/22 due to hypoxic respiratory failure, has been able to be be transitioned from BiPap to Airvo-- also disoriented and unable to provide additional information to RDN. Per surgery notes pt okay to transition to enteral feeds.    Is s/p exploratory laparotomy, lysis of adhesions, small bowel resection with anastomosis, closure on 6/18.    Anthropometrics:  Height: 175.3 cm (5' 9\")   Weight: 79.5 kg (175 lb 4.3 oz)   BMI (Calculated): 25.87  IBW/kg (Dietitian Calculated): 72.7 kg  Percent of IBW: 103 %    Weight History:   Date/Time Weight   06/24/24 0630 79.5 kg (175 lb 4.3 oz)   06/14/24 0004 75.2 kg (165 lb 12.6 oz)     Weight Change %:   Wt up 10.3 kg, likely 2/2 fluid as pt with generalized edema    Nutrition Focused Physical Exam Findings:    Edema:  Edema Location: generalized  Physical Findings:  Skin:  (surgical wound, stage 2 to periarea)    Nutrition Significant Labs:  BG POCT trend:   Results from last 7 days   Lab Units 06/25/24  0826 06/24/24  2108 06/24/24  1425 06/24/24  1133 06/24/24  0813   POCT GLUCOSE mg/dL 215* 140* 175* 181* 192*    , TPN/PPN Labs:   Results from last 7 days   Lab Units 06/25/24  0217 06/23/24  2323 06/23/24  0313 06/22/24 2002 06/20/24  0510 06/19/24  0647   GLUCOSE mg/dL 213* 217* 166* 158*   < > 135*   POTASSIUM mmol/L 4.2 3.8 3.8 3.9   < > 4.1   PHOSPHORUS mg/dL 3.1 3.3 2.4* 2.1*   < > 2.1*   MAGNESIUM mg/dL 1.89 2.01 1.88 2.04   < > 1.70   SODIUM mmol/L 141 135* 136 137   < > 148*   CHLORIDE mmol/L 104 100 99 99   < > 106   TRIGLYCERIDES mg/dL  --   --   --   --   --  83    < > = values in this interval not displayed.        Nutrition Specific Medications:  Scheduled medications  fat emulsion fish oil/plant based, 250 mL, intravenous, Daily Lipids  insulin glargine, 12 Units, subcutaneous, Once  insulin glargine, 27 Units, subcutaneous, " q AM  insulin lispro, 0-15 Units, subcutaneous, q6h  pantoprazole, 40 mg, intravenous, BID  Continuous medications    Adult Clinimix Parenteral Nutrition, 83 mL/hr, Last Rate: 83 mL/hr (06/25/24 0700)  heparin, 0-4,000 Units/hr, Last Rate: 1,300 Units/hr (06/25/24 0700)      PRN medications  PRN medications: alteplase, dextrose, dextrose, glucagon, glucagon, HYDROmorphone, HYDROmorphone, naloxone, ondansetron    I/O:   Last BM Date: 06/25/24; Stool Appearance: Soft (06/25/24 0930)    Dietary Orders (From admission, onward)       Start     Ordered    06/22/24 1522  NPO Diet; Effective now  Diet effective now         06/22/24 1525                     Estimated Needs:   Total Energy Estimated Needs (kCal):  (1281-4090)  Method for Estimating Needs: ABW x 28  Total Protein Estimated Needs (g):  (98+)  Method for Estimating Needs: ABW x 1.3+  Total Fluid Estimated Needs (mL):  (per team)      Nutrition Diagnosis   Malnutrition Diagnosis  Patient has Malnutrition Diagnosis: Yes  Diagnosis Status: New  Malnutrition Diagnosis: Severe malnutrition related to acute disease or injury  As Evidenced by: <=50% of estimated energy needs for >=5 days; severe fat loss      Nutrition Interventions/Recommendations         Nutrition Prescription:  Individualized Nutrition Prescription Provided for : transition from parenteral nutrition to enteral nutrition support vs oral diet        Nutrition Interventions:   Advance diet as medically appropriate-- if pt not safe to consume oral diet can consider dobhoff placement and starting enteral feeds:  Isosource 1.5 @ 20 ml/hr, increase by 10 ml q8h to goal rate of 60 ml/hr to provide 2160 kcal, 98 g protein, 1100 ml free H20. Free H20 flush per team.     TPN weaning:  Once pt tolerating enteral feeds at 20 ml/hr can discontinue SMOF lipids.   For every 10 ml increase in tube feed can decrease TPN by 20 ml/hr until off.  If able to consume oral diet can cut in half every 2 hrs until <30 ml/hr  and then can discontinue--if oral intake is poor would place dobhoff and refer to above recs.    Collaboration and Referral of Nutrition Care: Team meeting involving nutrition professional    Continue to monitor POC glucose q6h, daily wt, RFP + mag daily, and LFTs/triglcyerides weekly while on TPN      Nutrition Monitoring and Evaluation   Food/Nutrient Related History Monitoring  Monitoring and Evaluation Plan: Enteral and parenteral nutrition intake  Enteral and Parenteral Nutrition Intake: Enteral nutrition formula/solution, Parenteral nutrition formula/solution  Criteria: pt will transition from parenteral to enteral nutrition or oral diet, nutrition support to provide >/= 80% needs    Biochemical Data, Medical Tests and Procedures  Monitoring and Evaluation Plan: Electrolyte/renal panel, Glucose/endocrine profile  Criteria: lytes WNL  Criteria: POC glucose <180 mg/dl    Time Spent (min): 30 minutes

## 2024-06-25 NOTE — PROGRESS NOTES
[facility]  ACUTE CARE SURGERY - PROGRESS NOTE    Patient Name: Enrike Crespo  MRN: [id number]  Admit Date: 613  : 1931  AGE: 89 y.o.   GENDER: male  ==============================================================================  TODAY'S ASSESSMENT AND PLAN OF CARE:  Enrike Crespo is a 89 y.o. male with PMH sick sinus syndrome status post pacemaker, hypertension, seizure, DM, afib, hyperthyroidism, PE, HF, cardiac arrest with defibrillator, stroke, sebaceous cysts, who presented for SBO 2/2 possible internal hernia.     GGC w/ SBFT  showed contrast retained in the stomach which was subsequently suctioned out from NGT after back on suction for increasing oxygen requirement. Abdominal exam is unchanged. Patient had about 4.8L out from NGT with new SHERLYN. Increased IVF to 100mL/hr but due to concern of heart failure and increased oxygen requirement, will hold off any any fluid boluses. Creatinine is down trending today. Still requiring 3L NC for SpO2 >90%. Not having consistent flatus or bowel movements but NGT output is decreased. Plan for CT A/P non con (concern for SHERLYN) with oral contrast and diagnostic and therapeutic for SBO. Also will give an enema.  CT findings concerning for a closed loop small bowel obstruction likely secondary to a pericecal internal hernia. Patient taken to the OR on .    OR : exploratory laparotomy, lysis of adhesions, small bowel resection with anastomosis, closure     Transferred to ICU on  for hypoxic failure requiring bipap and now on airvo at 38% FIO2, 60 L. Has ongoing weak cough and respiratory effort. CT CAP obtained  on 24 concerning for worsening pulmonary edema and possible PNA. Started on empiric antibiotic for HCAP. CT abdomen overall not concerning, no sign of leak from anastomosis.     Overnight events:  Remains in ICU. Disoriented. NAEON. Now with ROBF.    Plan:  - agree with ongoing HCAP antibiotics   -  continue TPN for now  - would be ok with enteral feeds via dobhoff at this time  - continue heparin gtt for AFIB and high chadvasc of 7   - TSICU      Patient discussed with Attending Dr. Carlene Ramirez  Acute Care Surgery   P [phone]      PHYSICAL EXAM:  Heart Rate:  []   Temp:  [36.8 °C (98.2 °F)-37.7 °C (99.9 °F)]   Resp:  [15-27]   BP: ()/(43-73)   SpO2:  [86 %-99 %]   Constitutional: no acute distress, disoriented  Cardiac: nontachy  Pulmonary: shallow breathing, on airvo  Abdomen: soft, non distended, non tender, incision c/d/I without signs of infection, closed with staples  Skin: warm and dry   Extremities: no swelling noted      IMAGING SUMMARY:   - CXR read pending; improved aeration bilaterally; AICD in place    LABS:  Results from last 7 days   Lab Units 06/25/24 0217 06/23/24 2323 06/23/24  0313   WBC AUTO x10*3/uL 14.1* 17.3* 20.9*   HEMOGLOBIN g/dL 8.4* 8.3* 10.0*   HEMATOCRIT % 26.6* 26.0* 30.5*   PLATELETS AUTO x10*3/uL 170 137* 147*     Results from last 7 days   Lab Units 06/25/24 0217 06/23/24 2323 06/23/24  0313   APTT seconds 63* 72* 53*   INR  1.3* 1.3* 1.2*     Results from last 7 days   Lab Units 06/25/24 0217 06/23/24 2323 06/23/24  0313   SODIUM mmol/L 141 135* 136   POTASSIUM mmol/L 4.2 3.8 3.8   CHLORIDE mmol/L 104 100 99   CO2 mmol/L 31 30 30   BUN mg/dL 30* 32* 28*   CREATININE mg/dL 0.91 0.91 0.84   CALCIUM mg/dL 7.9* 7.5* 8.0*   GLUCOSE mg/dL 213* 217* 166*           Results from last 7 days   Lab Units 06/22/24  1102 06/22/24  0859   POCT PH, ARTERIAL pH 7.47* 7.49*   POCT PCO2, ARTERIAL mm Hg 42 41   POCT PO2, ARTERIAL mm Hg 68* 63*   POCT HCO3 CALCULATED, ARTERIAL mmol/L 30.6* 31.2*   POCT BASE EXCESS, ARTERIAL mmol/L 6.3* 7.2*       I have reviewed all medications, laboratory results, and imaging pertinent for today's encounter.

## 2024-06-25 NOTE — PROGRESS NOTES
[facility]  TRAUMA ICU - PROGRESS NOTE    Patient Name: Enrike Crespo  MRN: [id number]  Admit Date: 613  : 1931  AGE: 89 y.o.   GENDER: male  ==============================================================================  89 yo M with multiple medical comorbidities including SSS s/p atrial paced PPM, prior cardiac arrest, HF, PE, Afib on Eliquis, DM, hyperthyroidism, and HTN, who was initially admitted for a high-grade SBO and on  underwent ex lap with two small bowel resections for a closed loop obstruction, transferred to ICU on  for rising O2 requirements. Rising leukocytosis and CXR with worsening LLL consolidation, suggesting infection superimposed on compressive atelectasis and mild effusion. Course further complicated by ongoing ileus. Still requiring HFNC but stable.    TODAY'S ASSESSMENT AND PLAN OF CARE:    NEURO:  - IV tylenol  - IV dilaudid prn  - start zyprexa ODT nightly for delirium     RESP:  - maintain SpO2 >88%, currently maxxed on airvo   - encourage IS, deep breathing  - scheduled albuterol nebulizers  - treat for hospital acquired pneumonia (see ID section)  - Thoracic Surgery consult for possible R lung neoplastic process    -> lesion known to family, no desire for further workup. Can follow up outpatient if family desires at a later date   - cefepime for presumed HAP -     CARD:  - Continuous cardiac monitoring  - IV metoprolol (in place of home dose)  - holding home statin, entresto  - anticoagulate for Afib (see Hematology section)      - on heparin drip  - Maintain MAP >65     GI/FEN:  - NPO for now  - had large bowel movement overnight   - TPN 83 ml/hr  - continue thiamine (for severe malnutrition) for 7 days  - holding tube feeds  - Strict I/O     RENAL:  - no mina catheter  - UOP aim 0.5-1.0 ml/kg/hr  - holding home finasteride until able to take PO     HEME:  - no transfusion needs at this time  - continue heparin gtt for  Afib (normally takes Eliquis)     ID:  - vancomycin and cefepime for empiric coverage of hospital acquired pneumonia (6/22 - 6/28)  - MRSA swab negative, discontinued vanc     MSK:  - PT/OT  - Padding to protect pressure points     ENDO:  - continue SSI #3  - continue lantus 15 units   - 6/24 last 24 hrs glucose levels 192-264     - add 12 units of lantus today 6/24 (for total of 27 units)  - POCT q6h now that glycemic status improved  - TSH within normal limits      PPX:  - SCDs  - already anticoagulated     TUBES/LINES:  - PIV  - PICC line     DISPO: Remain in ICU     CODE STATUS: DNR-CCA, clarified 6/22    Pt seen and discussed with fellow Dr. Sndyer    Total face to face time spent with patient/family of 40 minutes, with >50% of the time spent discussing plan of care/management, counseling/educating on disease processes, explaining results of diagnostic testing.     Hawa Farmer PA-C  Trauma, Critical Care, Acute Care Surgery   Floor: [id number]  TSICU: [id number]   ==============================================================================  CHIEF COMPLAINT / OVERNIGHT EVENTS / HPI:   Overnight pt hypotensive, given 1L, responded well and became hypertensive and given metop. Pt this morning A&Ox2     MEDICAL HISTORY / ROS:  Admission history and ROS reviewed. Pertinent changes as follows:    PHYSICAL EXAM:  Heart Rate:  []   Temp:  [36.4 °C (97.5 °F)-37.7 °C (99.9 °F)]   Resp:  [17-27]   BP: ()/(41-73)   SpO2:  [86 %-100 %]   Physical Exam  HENT:      Head: Normocephalic and atraumatic.      Mouth/Throat:      Mouth: Mucous membranes are moist.   Eyes:      Extraocular Movements: Extraocular movements intact.   Cardiovascular:      Rate and Rhythm: Normal rate.   Pulmonary:      Comments: Airvo 60L and 52%  Abdominal:      General: Abdomen is flat. There is no distension.      Palpations: Abdomen is soft.   Musculoskeletal:      Comments: Moving extremities spontaneously   Skin:     General: Skin is warm and  dry.   Neurological:      Mental Status: He is alert.      Comments: A&Ox2, following commands    Psychiatric:         Mood and Affect: Mood normal.      Comments: A&Ox2         IMAGING SUMMARY:  (summary of new imaging findings, not a copy of dictation)    LABS:  Results from last 7 days   Lab Units 06/25/24  0217 06/23/24 2323 06/23/24  0313   WBC AUTO x10*3/uL 14.1* 17.3* 20.9*   HEMOGLOBIN g/dL 8.4* 8.3* 10.0*   HEMATOCRIT % 26.6* 26.0* 30.5*   PLATELETS AUTO x10*3/uL 170 137* 147*     Results from last 7 days   Lab Units 06/25/24  0217 06/23/24 2323 06/23/24  0313   APTT seconds 63* 72* 53*   INR  1.3* 1.3* 1.2*     Results from last 7 days   Lab Units 06/25/24 0217 06/23/24 2323 06/23/24  0313   SODIUM mmol/L 141 135* 136   POTASSIUM mmol/L 4.2 3.8 3.8   CHLORIDE mmol/L 104 100 99   CO2 mmol/L 31 30 30   BUN mg/dL 30* 32* 28*   CREATININE mg/dL 0.91 0.91 0.84   CALCIUM mg/dL 7.9* 7.5* 8.0*   GLUCOSE mg/dL 213* 217* 166*         Results from last 7 days   Lab Units 06/25/24  1037 06/25/24  1014 06/22/24  1102   POCT PH, ARTERIAL pH 7.45* 7.40 7.47*   POCT PCO2, ARTERIAL mm Hg 44* 49* 42   POCT PO2, ARTERIAL mm Hg 72* 44* 68*   POCT HCO3 CALCULATED, ARTERIAL mmol/L 30.6* 30.4* 30.6*   POCT BASE EXCESS, ARTERIAL mmol/L 6.0* 4.5* 6.3*     I have reviewed all medications, laboratory results, and imaging pertinent for today's encounter.

## 2024-06-25 NOTE — PROGRESS NOTES
PLAN: Transferred to ICU on 6/22 for hypoxic failure requiring bipap and now on airvo at 38% FIO2, 60 L. Has ongoing weak cough and respiratory effort. CT CAP obtained  on 6/22/24 concerning for worsening pulmonary edema and possible PNA. Started on empiric antibiotic for HCAP. CT abdomen overall not concerning, no sign of leak from anastomosis.   - agree with ongoing HCAP antibiotics   - continue TPN  - consider speech evaluation prior to initiation of PO intake vs dobhoff placement  - continue heparin gtt for AFIB and high chadvasc of 7   - skin, line, ICU tlc.    PAYOR: [employer]/ will need precert for placement.     DISPO: SNF- has accepting facility. Updates sent 06/25    SUPPORT/CONTACT: Rina (dtr) [phone]

## 2024-06-25 NOTE — PROGRESS NOTES
Communication Note    Patient Name: Enrike Crespo  MRN: [id number]  Today's Date: 6/25/2024   Room: 04/04A    Discipline: Physical Therapy      Missed Visit: Yes  Missed Visit Reason:  (PT intervention reviewed and attempted.  Patient currently on 95% 60L Airvo and SpO2 90% with desaturations on bed mobiltiy with nursing team.  Will continue to monitor and follow up as medically appropriate.)      06/25/24 at 10:39 AM   Devan Beth, PT   Rehab Office: [phone]

## 2024-06-25 NOTE — PROGRESS NOTES
[facility]  TRAUMA ICU - PROGRESS NOTE    Patient Name: Enrike Crespo  MRN: [id number]  Admit Date: 613  : 1931  AGE: 89 y.o.   GENDER: male  ==============================================================================  89 yo M with multiple medical comorbidities including SSS s/p atrial paced PPM, prior cardiac arrest, HF, PE, Afib on Eliquis, DM, hyperthyroidism, and HTN, who was initially admitted for a high-grade SBO and on  underwent ex lap with two small bowel resections for a closed loop obstruction, transferred to ICU on  for rising O2 requirements. Rising leukocytosis and CXR with worsening LLL consolidation, suggesting infection superimposed on compressive atelectasis and mild effusion. Course further complicated by ongoing ileus. Still requiring HFNC but stable.    TODAY'S ASSESSMENT AND PLAN OF CARE:    Family meeting had with son and daughter, code status changed to DNR comfort care.     NEURO:  - IV tylenol  - IV dilaudid prn  - holding PO analgesia in setting of ileus  - start zyprexa ODT nightly for delirium  - morphine gtt  - ativan prn      RESP:  - maintain SpO2 >88%,  currently on airvo   - encourage IS, deep breathing  - scheduled albuterol nebulizers  - treat for hospital acquired pneumonia (see ID section)  - Thoracic Surgery consult for possible R lung neoplastic process    -> lesion known to family, no desire for further workup. Can follow up outpatient if family desires at a later date      CARD:  - Continuous cardiac monitoring  - IV metoprolol (in place of home dose)  - holding home statin, entresto  - anticoagulate for Afib (see Hematology section)      - on heparin drip  - Maintain MAP >65     GI/FEN:  - NPO for now  - had large bowel movement overnight   - TPN 83 ml/hr  - continue thiamine (for severe malnutrition) for 7 days  - holding tube feeds  - Strict I/O     RENAL:  - no mina catheter  - UOP aim 0.5-1.0 ml/kg/hr  -  holding home finasteride until able to take PO     HEME:  - no transfusion needs at this time  - dc heparin gtt/comfort care    ID:  - finished abx   - MRSA swab negative, discontinued vanc     MSK:  - PT/OT  - Padding to protect pressure points     ENDO:  - no needs/comfort care     PPX:  - SCDs  - already anticoagulated     TUBES/LINES:  - PIV  - PICC line     DISPO: Remain in ICU     CODE STATUS: DNR/comfort care     Pt seen and discussed with Dr. Tonja Wakefield PGY1  Trauma, Critical Care, Acute Care Surgery   Floor: [phone]  TSICU: [id number]   ==============================================================================  CHIEF COMPLAINT / OVERNIGHT EVENTS / HPI:   Overnight pt hypotensive, given 1L, responded well and became hypertensive and given metop. Pt this morning A&Ox2     MEDICAL HISTORY / ROS:  Admission history and ROS reviewed. Pertinent changes as follows:    PHYSICAL EXAM:  Heart Rate:  []   Temp:  [36.4 °C (97.5 °F)-37.7 °C (99.9 °F)]   Resp:  [17-27]   BP: ()/(41-73)   SpO2:  [86 %-100 %]   Physical Exam  HENT:      Head: Normocephalic and atraumatic.      Mouth/Throat:      Mouth: Mucous membranes are moist.   Eyes:      Extraocular Movements: Extraocular movements intact.   Cardiovascular:      Rate and Rhythm: Normal rate.   Pulmonary:      Comments: Airvo 60L and 52%  Abdominal:      General: Abdomen is flat. There is no distension.      Palpations: Abdomen is soft.   Musculoskeletal:      Comments: Moving extremities spontaneously   Skin:     General: Skin is warm and dry.   Neurological:      Mental Status: He is alert.      Comments: A&Ox2, following commands    Psychiatric:         Mood and Affect: Mood normal.      Comments: A&Ox2         IMAGING SUMMARY:  (summary of new imaging findings, not a copy of dictation)    LABS:  Results from last 7 days   Lab Units 06/25/24  0217 06/23/24  2323 06/23/24  0313   WBC AUTO x10*3/uL 14.1* 17.3* 20.9*   HEMOGLOBIN g/dL  8.4* 8.3* 10.0*   HEMATOCRIT % 26.6* 26.0* 30.5*   PLATELETS AUTO x10*3/uL 170 137* 147*     Results from last 7 days   Lab Units 06/25/24  0217 06/23/24 2323 06/23/24  0313   APTT seconds 63* 72* 53*   INR  1.3* 1.3* 1.2*     Results from last 7 days   Lab Units 06/25/24 0217 06/23/24 2323 06/23/24 0313   SODIUM mmol/L 141 135* 136   POTASSIUM mmol/L 4.2 3.8 3.8   CHLORIDE mmol/L 104 100 99   CO2 mmol/L 31 30 30   BUN mg/dL 30* 32* 28*   CREATININE mg/dL 0.91 0.91 0.84   CALCIUM mg/dL 7.9* 7.5* 8.0*   GLUCOSE mg/dL 213* 217* 166*         Results from last 7 days   Lab Units 06/25/24  1037 06/25/24  1014 06/22/24  1102   POCT PH, ARTERIAL pH 7.45* 7.40 7.47*   POCT PCO2, ARTERIAL mm Hg 44* 49* 42   POCT PO2, ARTERIAL mm Hg 72* 44* 68*   POCT HCO3 CALCULATED, ARTERIAL mmol/L 30.6* 30.4* 30.6*   POCT BASE EXCESS, ARTERIAL mmol/L 6.0* 4.5* 6.3*     I have reviewed all medications, laboratory results, and imaging pertinent for today's encounter.

## 2024-06-25 NOTE — SIGNIFICANT EVENT
Goals of care:    Family members present: Son - Reji, Daughter - Rina    Discussed with family patient's worsening hypoxia today. Currently 95% FIO2 and 60L on Airvo. Patient is currently unable to clear respiratory secretions, despite scheduled nasotracheal suctioning. Family understands that intubation would not be within patient's wishes. Understood that patient's declining respiratory effort is not an easily reversible cause. They would want patient to remain comfortable and be able to pass away in peace.     Code status: DNR Comfort Care

## 2024-06-25 NOTE — PROGRESS NOTES
Occupational Therapy                 Therapy Communication Note    Patient Name: Enrike Crespo  MRN: [id number]  Today's Date: 6/25/2024     Discipline: Occupational Therapy    Missed Visit Reason: Missed Visit Reason: Patient placed on medical hold (Patient currently on 95% 60L Airvo and SpO2 90% with desaturations on bed mobiltiy with nursing team.  Will continue to monitor and follow up as medically appropriate.)    Missed Time: Attempt    Comment:

## 2024-06-26 VITALS
WEIGHT: 175.27 LBS | TEMPERATURE: 98.4 F | DIASTOLIC BLOOD PRESSURE: 45 MMHG | OXYGEN SATURATION: 42 % | SYSTOLIC BLOOD PRESSURE: 82 MMHG | HEIGHT: 69 IN | BODY MASS INDEX: 25.96 KG/M2

## 2024-06-26 LAB
BACTERIA SPEC RESP CULT: ABNORMAL
GRAM STN SPEC: ABNORMAL
GRAM STN SPEC: ABNORMAL

## 2024-06-26 PROCEDURE — 2500000004 HC RX 250 GENERAL PHARMACY W/ HCPCS (ALT 636 FOR OP/ED): Performed by: STUDENT IN AN ORGANIZED HEALTH CARE EDUCATION/TRAINING PROGRAM

## 2024-06-26 NOTE — SIGNIFICANT EVENT
Mr. Crespo is DNR-comfort care. Nursing noted no HR, no RR on monitor. This provider examined the patient. No heart beat, no respiratory effort noted. Time: 0810.     TERRY Arguello-CNP  Trauma, Critical Care, ACS  [phone]/ [facility] chat

## 2024-06-26 NOTE — PROGRESS NOTES
[facility]  ACUTE CARE SURGERY - PROGRESS NOTE    Patient Name: Enrike Crespo  MRN: [id number]  Admit Date: 613  : 1931  AGE: 89 y.o.   GENDER: male  ==============================================================================  TODAY'S ASSESSMENT AND PLAN OF CARE:  Enrike Crespo is a 89 y.o. male with PMH sick sinus syndrome status post pacemaker, hypertension, seizure, DM, afib, hyperthyroidism, PE, HF, cardiac arrest with defibrillator, stroke, sebaceous cysts, who presented for SBO 2/2 possible internal hernia.     GGC w/ SBFT  showed contrast retained in the stomach which was subsequently suctioned out from NGT after back on suction for increasing oxygen requirement. Abdominal exam is unchanged. Patient had about 4.8L out from NGT with new SHERLYN. Increased IVF to 100mL/hr but due to concern of heart failure and increased oxygen requirement, will hold off any any fluid boluses. Creatinine is down trending today. Still requiring 3L NC for SpO2 >90%. Not having consistent flatus or bowel movements but NGT output is decreased. Plan for CT A/P non con (concern for SHERLYN) with oral contrast and diagnostic and therapeutic for SBO. Also will give an enema.  CT findings concerning for a closed loop small bowel obstruction likely secondary to a pericecal internal hernia. Patient taken to the OR on .    OR : exploratory laparotomy, lysis of adhesions, small bowel resection with anastomosis, closure     Transferred to ICU on  for hypoxic failure requiring bipap and now on airvo at 38% FIO2, 60 L. Has ongoing weak cough and respiratory effort. CT CAP obtained  on 24 concerning for worsening pulmonary edema and possible PNA. Started on empiric antibiotic for HCAP. CT abdomen overall not concerning, no sign of leak from anastomosis. Made comfort care yesterday evening due to worsening respiratory status    Overnight events:  Transitioned to comfort measures  yesterday. Declining respiratory status overnight. Time of death called this morning    Plan:  - patient passed away this morning         SALINAS Ramirez  Acute Care Surgery   P [phone]      PHYSICAL EXAM:  Heart Rate:  [0-94]   Temp:  [36.4 °C (97.5 °F)-36.9 °C (98.4 °F)]   Resp:  [0-25]   BP: ()/(40-99)   SpO2:  [42 %-100 %]     Gen: nad, comfortable        LABS:  Results from last 7 days   Lab Units 06/25/24 0217 06/23/24 2323 06/23/24 0313   WBC AUTO x10*3/uL 14.1* 17.3* 20.9*   HEMOGLOBIN g/dL 8.4* 8.3* 10.0*   HEMATOCRIT % 26.6* 26.0* 30.5*   PLATELETS AUTO x10*3/uL 170 137* 147*     Results from last 7 days   Lab Units 06/25/24  0217 06/23/24 2323 06/23/24 0313   APTT seconds 63* 72* 53*   INR  1.3* 1.3* 1.2*     Results from last 7 days   Lab Units 06/25/24  0217 06/23/24 2323 06/23/24  0313   SODIUM mmol/L 141 135* 136   POTASSIUM mmol/L 4.2 3.8 3.8   CHLORIDE mmol/L 104 100 99   CO2 mmol/L 31 30 30   BUN mg/dL 30* 32* 28*   CREATININE mg/dL 0.91 0.91 0.84   CALCIUM mg/dL 7.9* 7.5* 8.0*   GLUCOSE mg/dL 213* 217* 166*           Results from last 7 days   Lab Units 06/25/24  1037 06/25/24  1014 06/22/24  1102   POCT PH, ARTERIAL pH 7.45* 7.40 7.47*   POCT PCO2, ARTERIAL mm Hg 44* 49* 42   POCT PO2, ARTERIAL mm Hg 72* 44* 68*   POCT HCO3 CALCULATED, ARTERIAL mmol/L 30.6* 30.4* 30.6*   POCT BASE EXCESS, ARTERIAL mmol/L 6.0* 4.5* 6.3*       I have reviewed all medications, laboratory results, and imaging pertinent for today's encounter.

## 2024-06-26 NOTE — SIGNIFICANT EVENT
Death Within 24 Hours of Soft Wrist Restraint Utilization    Death within 24 hours of soft wrist restraint logged at 6/26/2024 at 12:38 PM.    Vicky Galdamez RN  Date: 6/26/2024  Time: 12:38 PM

## 2024-06-26 NOTE — PROGRESS NOTES
Occupational Therapy                 Therapy Communication Note    Patient Name: Enrike Crespo  MRN: [id number]  Today's Date: 6/26/2024     Discipline: Occupational Therapy    Missed Visit Reason: Missed Visit Reason: Patient placed on medical hold (Per ID rounds, pt going comfort care. OT orders dc)    Missed Time: Attempt    Comment:

## 2024-06-26 NOTE — DISCHARGE SUMMARY
Discharge Diagnosis  Small bowel obstruction (Multi)    Issues Requiring Follow-Up  N/a;      Test Results Pending At Discharge  Pending Labs       Order Current Status    Surgical Pathology Exam In process            Hospital Course     Enrike Crespo is a 89 y.o. male with PMH sick sinus syndrome status post pacemaker, hypertension, seizure, DM, afib, hyperthyroidism, PE, HF, cardiac arrest with defibrillator, stroke, sebaceous cysts, who presented for SBO 2/2 possible internal hernia.   GGC w/ SBFT  showed contrast retained in the stomach which was subsequently suctioned out from NGT after back on suction for increasing oxygen requirement. Abdominal exam is unchanged. Patient had about 4.8L out from NGT with new SHERLYN. Increased IVF to 100mL/hr but due to concern of heart failure and increased oxygen requirement, will hold off any any fluid boluses. Creatinine is down trending today. Still requiring 3L NC for SpO2 >90%. Not having consistent flatus or bowel movements but NGT output is decreased. Plan for CT A/P non con (concern for SHERLYN) with oral contrast and diagnostic and therapeutic for SBO. Also will give an enema. CT findings concerning for a closed loop small bowel obstruction likely secondary to a pericecal internal hernia. Patient taken to the OR on .     OR : exploratory laparotomy, lysis of adhesions, small bowel resection with anastomosis, closure      Transferred to ICU on  for hypoxic failure requiring bipap and now on airvo at 38% FIO2, 60 L. Has ongoing weak cough and respiratory effort. CT CAP obtained  on 24 concerning for worsening pulmonary edema and possible PNA. Started on empiric antibiotic for HCAP. CT abdomen overall not concerning, no sign of leak from anastomosis.   Patient continued with worsening respiratory status and hypoxia. Currntly on 95% FIO2 and 60L airvo. Patient was unable to clear respiratory secretions despite frequent NT suctioning. Family  discussion was had yesterday, and decision was made that intubation would not be within patient's wishes.   Patient passed at 0855 on 6/26 - 2/2 hypoxic respiratory failure.     Pertinent Physical Exam At Time of Discharge  - Neurologic: not responsive to painful stimuli, eyes closed, no verbal  - Eyes: pupils dilated, fixed, no oculocephalic reflex, no corneal reflex  - Respiratory: No respiratory activity  - Cardiac: no palpable carotid pulse, asystole on monitor     Home Medications     Medication List      ASK your doctor about these medications     clopidogrel 75 mg tablet; Commonly known as: Plavix   Eliquis 5 mg tablet; Generic drug: apixaban   ferrous sulfate (325 mg ferrous sulfate) tablet   finasteride 5 mg tablet; Commonly known as: Proscar   glipiZIDE XL 2.5 mg 24 hr tablet; Commonly known as: Glucotrol XL   insulin glargine 100 unit/mL injection; Commonly known as: Lantus   insulin glargine-yfgn 100 unit/mL (3 mL) Pen   metoprolol succinate XL 25 mg 24 hr tablet; Commonly known as: Toprol-XL   pravastatin 20 mg tablet; Commonly known as: Pravachol   propylthiouracil 50 mg tablet; Commonly known as: PTU   sacubitriL-valsartan 24-26 mg tablet; Commonly known as: Entresto   triamcinolone 0.1 % cream; Commonly known as: Kenalog; Apply topically 2   times a day. Apply to affected area 1-2 times daily as needed.       Outpatient Follow-Up  No future appointments.    Jaron Wakefield MD

## 2024-06-26 NOTE — SIGNIFICANT EVENT
Death note    Date: 6/26/2024    Time of death: 0855    Cause of death: hypoxic respiratory failure    Physical exam:  - Neurologic: not responsive to painful stimuli, eyes closed, no verbal  - Eyes: pupils dilated, fixed, no oculocephalic reflex, no corneal reflex  - Respiratory: No respiratory activity  - Cardiac: no palpable carotid pulse, asystole on monitor    Kuldeep Snyder MD Fellow

## 2024-07-11 NOTE — DOCUMENTATION CLARIFICATION NOTE
"    PATIENT:               STEFFI FORTE  ACCT #:                  [id number]  MRN:                       [id number]  :                       [id number]  ADMIT DATE:       2024 11:45 PM  DISCH DATE:        2024 11:13 AM  RESPONDING PROVIDER #:        [id number]          PROVIDER RESPONSE TEXT:    Acute on Chronic Systolic Congestive Heart Failure    CDI QUERY TEXT:    Clarification    Instruction:    Based on your assessment of the patient and the clinical information, please provide the requested documentation by clicking on the appropriate radio button and enter any additional information if prompted.    Question: Please further clarify the type and acuity of congestive heart failure    When answering this query, please exercise your independent professional judgment. The fact that a question is being asked, does not imply that any particular answer is desired or expected.    The patient's clinical indicators include:  Clinical Information: 89 [place] [facility] [place]    Clinical Indicators:  H and P revealed \"Patient has cardiac history with EF 20-25 percent would potentially benefit from cardiac anaesthesia prior to potential OR.\"    Discharge Summary revealed \"Patient had about 4.8L out from NGT with new SHERLYN. Increased IVF to 100mL/hr but due to concern of heart failure and increased oxygen requirement, will hold off any any fluid boluses...Transferred to ICU on  for hypoxic failure requiring bipap and now on airvo at 38 percent FIO2, 60 L. Has ongoing weak cough and respiratory effort. CT CAP obtained  on 24 concerning for worsening pulmonary edema and possible PNA.\"    BNP of 387 on  at 0217.    Treatment: 20 mg IV Lasix on  at 1345. BNP.    Risk Factors: EF of 20-25 percent with concern for worsening pulmonary edema and elevated BNP. Respiratory failure requiring BiPAP and Airvo.  Options provided:  -- Acute Systolic Congestive Heart Failure  -- Acute on Chronic " Systolic Congestive Heart Failure  -- Chronic Systolic Congestive Heart Failure  -- Other - I will add my own diagnosis  -- Refer to Clinical Documentation Reviewer    Query created by: Meek Spencer Jr on 7/1/2024 12:43 PM      Electronically signed by:  GHULAM HARLEY MD 7/11/2024 9:41 AM

## (undated) DEVICE — STAPLER, LINEAR, 3.5 60MM, RELOADABLE, BLUE

## (undated) DEVICE — SUTURE, PROLENE, 1 X 60IN TP-1, BLUE

## (undated) DEVICE — MANIFOLD, 4 PORT NEPTUNE STANDARD

## (undated) DEVICE — Device

## (undated) DEVICE — SUTURE, VICRYL PLUS 3-0, SH, 27IN

## (undated) DEVICE — LIGASURE, MARYLAND JAW, OPEN DELIVERY

## (undated) DEVICE — CUTTER, PROX LINEAR, 75MM, REG TISSUE, W/ SAFETY LOCK OUT

## (undated) DEVICE — SUTURE, VICRYL, 0, 18 IN, UNDYED

## (undated) DEVICE — LIGASURE, SEALER/DIVIDER MARYLAND JAW, 5MM

## (undated) DEVICE — SUTURE, PDS II, 0, 27 IN, CT1, VIOLET

## (undated) DEVICE — CUTTER, PROXIMATE LINEAR RELOAD, 75MM, BLUE